# Patient Record
Sex: MALE | Race: WHITE | Employment: OTHER | URBAN - METROPOLITAN AREA
[De-identification: names, ages, dates, MRNs, and addresses within clinical notes are randomized per-mention and may not be internally consistent; named-entity substitution may affect disease eponyms.]

---

## 2017-01-04 ENCOUNTER — TELEPHONE (OUTPATIENT)
Dept: CARDIOLOGY CLINIC | Age: 77
End: 2017-01-04

## 2017-01-30 RX ORDER — AMIODARONE HYDROCHLORIDE 200 MG/1
TABLET ORAL
Qty: 90 TABLET | Refills: 1 | Status: SHIPPED | OUTPATIENT
Start: 2017-01-30 | End: 2017-12-18 | Stop reason: SDUPTHER

## 2017-03-01 ENCOUNTER — HOSPITAL ENCOUNTER (OUTPATIENT)
Dept: OTHER | Age: 77
Discharge: HOME | End: 2017-03-31
Attending: INTERNAL MEDICINE | Admitting: INTERNAL MEDICINE

## 2017-03-25 LAB
INR BLD: 2.4 (ref 0.85–1.15)
PROTHROMBIN TIME: 27.1 SEC (ref 9.6–13)

## 2017-04-06 ENCOUNTER — OFFICE VISIT (OUTPATIENT)
Dept: CARDIOLOGY CLINIC | Age: 77
End: 2017-04-06

## 2017-04-06 VITALS
OXYGEN SATURATION: 96 % | DIASTOLIC BLOOD PRESSURE: 68 MMHG | SYSTOLIC BLOOD PRESSURE: 142 MMHG | WEIGHT: 175 LBS | BODY MASS INDEX: 27.47 KG/M2 | HEART RATE: 66 BPM | HEIGHT: 67 IN

## 2017-04-06 DIAGNOSIS — I48.91 ATRIAL FIBRILLATION, UNSPECIFIED TYPE (HCC): Primary | ICD-10-CM

## 2017-04-06 DIAGNOSIS — I42.9 CARDIOMYOPATHY (HCC): ICD-10-CM

## 2017-04-06 DIAGNOSIS — E11.9 TYPE 2 DIABETES MELLITUS WITHOUT COMPLICATION, WITH LONG-TERM CURRENT USE OF INSULIN (HCC): ICD-10-CM

## 2017-04-06 DIAGNOSIS — Z79.4 TYPE 2 DIABETES MELLITUS WITHOUT COMPLICATION, WITH LONG-TERM CURRENT USE OF INSULIN (HCC): ICD-10-CM

## 2017-04-06 DIAGNOSIS — I42.9 CHF WITH CARDIOMYOPATHY (HCC): ICD-10-CM

## 2017-04-06 DIAGNOSIS — I10 ESSENTIAL HYPERTENSION, BENIGN: ICD-10-CM

## 2017-04-06 DIAGNOSIS — I50.9 CHF WITH CARDIOMYOPATHY (HCC): ICD-10-CM

## 2017-04-06 PROCEDURE — 99214 OFFICE O/P EST MOD 30 MIN: CPT | Performed by: INTERNAL MEDICINE

## 2017-05-31 ENCOUNTER — HOSPITAL ENCOUNTER (OUTPATIENT)
Dept: OTHER | Age: 77
Discharge: HOME | End: 2017-05-31
Attending: INTERNAL MEDICINE | Admitting: INTERNAL MEDICINE

## 2017-06-01 LAB
A/G RATIO: 1 (ref 1.1–2.2)
ALBUMIN SERPL-MCNC: 3.8 G/DL (ref 3.4–5)
ALP BLD-CCNC: 93 U/L (ref 40–129)
ALT SERPL-CCNC: 19 U/L (ref 10–40)
ANION GAP SERPL CALCULATED.3IONS-SCNC: 15 MMOL/L (ref 3–16)
AST SERPL-CCNC: 17 U/L (ref 15–37)
BILIRUB SERPL-MCNC: 0.5 MG/DL (ref 0–1)
BUN BLDV-MCNC: 77 MG/DL (ref 7–20)
CALCIUM SERPL-MCNC: 8.6 MG/DL (ref 8.3–10.6)
CHLORIDE BLD-SCNC: 102 MMOL/L (ref 99–110)
CO2: 24 MMOL/L (ref 21–32)
CREAT SERPL-MCNC: 2.9 MG/DL (ref 0.8–1.3)
ESTIMATED AVERAGE GLUCOSE: 205.9 MG/DL
GFR AFRICAN AMERICAN: 26
GFR NON-AFRICAN AMERICAN: 21
GLOBULIN: 3.7 G/DL
GLUCOSE BLD-MCNC: 198 MG/DL (ref 70–99)
HBA1C MFR BLD: 8.8 %
POTASSIUM SERPL-SCNC: 4.7 MMOL/L (ref 3.5–5.1)
SODIUM BLD-SCNC: 141 MMOL/L (ref 136–145)
TOTAL PROTEIN: 7.5 G/DL (ref 6.4–8.2)

## 2017-06-02 RX ORDER — METOLAZONE 5 MG/1
TABLET ORAL
Qty: 90 TABLET | Refills: 2 | Status: ON HOLD | OUTPATIENT
Start: 2017-06-02 | End: 2018-01-01 | Stop reason: HOSPADM

## 2017-07-06 ENCOUNTER — TELEPHONE (OUTPATIENT)
Dept: CARDIOLOGY CLINIC | Age: 77
End: 2017-07-06

## 2017-07-28 ENCOUNTER — TELEPHONE (OUTPATIENT)
Dept: CARDIOLOGY | Age: 77
End: 2017-07-28

## 2017-07-31 ENCOUNTER — ANTI-COAG VISIT (OUTPATIENT)
Dept: CARDIOLOGY | Age: 77
End: 2017-07-31

## 2017-10-09 ENCOUNTER — OFFICE VISIT (OUTPATIENT)
Dept: CARDIOLOGY CLINIC | Age: 77
End: 2017-10-09

## 2017-10-09 VITALS
HEART RATE: 60 BPM | BODY MASS INDEX: 30.13 KG/M2 | DIASTOLIC BLOOD PRESSURE: 76 MMHG | SYSTOLIC BLOOD PRESSURE: 126 MMHG | OXYGEN SATURATION: 96 % | HEIGHT: 67 IN | WEIGHT: 192 LBS

## 2017-10-09 DIAGNOSIS — Z95.2 S/P AVR (AORTIC VALVE REPLACEMENT): Primary | ICD-10-CM

## 2017-10-09 DIAGNOSIS — N18.30 CKD (CHRONIC KIDNEY DISEASE), STAGE III (HCC): ICD-10-CM

## 2017-10-09 DIAGNOSIS — I48.19 PERSISTENT ATRIAL FIBRILLATION (HCC): ICD-10-CM

## 2017-10-09 DIAGNOSIS — I25.5 ISCHEMIC CARDIOMYOPATHY: ICD-10-CM

## 2017-10-09 DIAGNOSIS — Z95.1 STATUS POST CORONARY ARTERY BYPASS GRAFT: ICD-10-CM

## 2017-10-09 DIAGNOSIS — I50.23 ACUTE ON CHRONIC SYSTOLIC CONGESTIVE HEART FAILURE (HCC): ICD-10-CM

## 2017-10-09 PROCEDURE — 99214 OFFICE O/P EST MOD 30 MIN: CPT | Performed by: INTERNAL MEDICINE

## 2017-10-09 RX ORDER — TORSEMIDE 20 MG/1
20 TABLET ORAL 2 TIMES DAILY
Qty: 180 TABLET | Refills: 3 | Status: ON HOLD | OUTPATIENT
Start: 2017-10-09 | End: 2018-01-01 | Stop reason: HOSPADM

## 2017-10-09 NOTE — COMMUNICATION BODY
[address] Office Note  10/9/2017     Subjective:  Mr Tatiana Torre presents today for cardiology follow up of HTN, aortic valve disease, A Fib, CHF   Today he states he states he feels well overall. He is edematous and complains of shortness of breath. He is using a walker for ambulation due to hip and back issues. He has gained 17 lbs over and above his normal weight. He denies chest pain, dizziness, fatigue or syncope. HPI:  Mr Tatiana Torre is here for cardiology follow up of hypertension aortic valve disease. He has cardiomyopathy and CHF. Review of Systems:  12 point ROS negative in all areas as listed below except as in [place]  Constitutional, EENT, Cardiovascular, pulmonary, GI, , Musculoskeletal, skin, neurological, hematological, endocrine, Psychiatric  Reviewed past medical history, social, and family history.    Does not smoke  Past Medical History:   Diagnosis Date    Acute non Q wave MI (myocardial infarction), initial episode of care ([facility] [place] [address] 04/01/08    Anemia of chronic disease     Blood transfusion     CAD (coronary artery disease)     Carotid occlusion, right     CHF (congestive heart failure) (HCC)     Chronic atrial fibrillation (HCC)     CKD (chronic kidney disease), stage III     Closed fracture of proximal end of right fibula 6/3/2016    [facility]     COPD exacerbation ([facility] [place] [address] 4/21/2013    Diabetes mellitus ([facility] [place] [address]     Glaucoma 2012    Eye surgery this 1 10 2012 and 1 17 2012    Hyperlipidemia     Hypertension     Lung disease     COPD    Mitral regurgitation     mild - moderate    Pneumonia 2009    Pulmonary nodule     Skin cancer     nose     Past Surgical History:   Procedure Laterality Date    AORTIC VALVE REPLACEMENT  4/14/08    bioprosthesis      CATARACT REMOVAL      left    CATARACT REMOVAL Bilateral     COLONOSCOPY  2/19/13    RECTAL, SIGMOID AND TRANSVERSE POLYPS    CORONARY ARTERY BYPASS GRAFT      5 vessel    weeks after starting Demadex  5. Healthy lifestyle education reviewed including nutrition, exercise and activity  6. Follow up with me in 8 weeks  Careful about salt and fluid restriction. QUALITY MEASURES  1. Tobacco Cessation Counseling: NA  2. Retake of BP if >140/90:   Yes  3. Documentation to PCP/referring for new patient:  Sent to PCP at close of office visit  4. CAD patient on anti-platelet: coumadin for  Parox. afib  5. CAD patient on STATIN therapy:  Yes  6.  Patient with CHF and aFib on anticoagulation:  Yes Warfarin       VIOLETTE Morales MD 10/9/2017 1:41 PM

## 2017-10-09 NOTE — PATIENT INSTRUCTIONS
Plan:  1. Start taking 80 mg Lasix twice daily until Demadex arrives  2. Continue taking Metolazone one time weekly  3. Start taking Demadex once it arrives. Stop taking Lasix at that time  4. BMP blood work 2 weeks after starting Demadex  5. Healthy lifestyle education reviewed including nutrition, exercise and activity  6.  Follow up with me in 8 weeks

## 2017-10-09 NOTE — PROGRESS NOTES
HERNIA REPAIR      MOHS SURGERY      SPINE SURGERY         Objective:   /76   Pulse 60   Ht 5' 7\" (1.702 m)   Wt 192 lb (87.1 kg)   SpO2 96%   BMI 30.07 kg/m²     Wt Readings from Last 3 Encounters:   10/09/17 192 lb (87.1 kg)   09/09/17 186 lb (84.4 kg)   04/06/17 175 lb (79.4 kg)       Physical Exam:  General: No Respiratory distress, appears well developed and well nourished. Eyes:  Sclera nonicteric  Nose/Sinuses:  negative findings: nose shows no deformity, asymmetry, or inflammation, nasal mucosa normal, septum midline with no perforation or bleeding  Back:  no pain to palpation  Joint:  no active joint inflammation  Musculoskeletal:  negative  Skin:  Warm and dry lesion on left side of nose that reoccurs bleeds scabs and then is back  Neck:  Positive for JVD and  No Carotid Bruits. Chest: Clear to auscultation, respiration easy  Cardiovascular: 2/6 systolic ejection murmur in aortic area,   RRR, S1S2 normal, no rub or thrill.   Extremities:   BLE edema, +2, No clubbing, cyanosis,  Pulses:  Absent pedal pulses right radial 2+, left radial weak  Neuro: intact    Medications:   Outpatient Encounter Prescriptions as of 10/9/2017   Medication Sig Dispense Refill    torsemide (DEMADEX) 20 MG tablet Take 1 tablet by mouth 2 times daily 180 tablet 3    metolazone (ZAROXOLYN) 5 MG tablet TAKE 1 TABLET DAILY OR AS OTHERWISE DIRECTED 90 tablet 2    amiodarone (CORDARONE) 200 MG tablet TAKE 1 TABLET DAILY (NEED BLOOD WORK AND EKG DONE) 90 tablet 1    KLOR-CON M10 10 MEQ extended release tablet TAKE 1 TABLET DAILY 90 tablet 1    warfarin (COUMADIN) 2 MG tablet Take 1 tablet by mouth daily 135 tablet 3    insulin lispro (HUMALOG) 100 UNIT/ML injection vial Inject into the skin 3 times daily (before meals) Sliding scale      lisinopril (PRINIVIL;ZESTRIL) 5 MG tablet Take 1 tablet by mouth daily (Patient taking differently: Take 5 mg by mouth 2 times daily Once daily) 30 tablet 3    carvedilol (COREG) 6.25 MG tablet TAKE 1 TABLET TWICE A  tablet 1    dorzolamide (TRUSOPT) 2 % ophthalmic solution Place 1 drop into the left eye 2 times daily.  BD INSULIN SYRINGE ULTRAFINE 31G X 5/16\" 0.3 ML MISC USE AS DIRECTED 270 each 3    Misc. Devices (ACAPELLA) MISC by Does not apply route 4 times daily. [employer] 1 each 0    B-D ULTRAFINE III SHORT PEN 31G X 8 MM MISC USE ONE DAILY 100 each 2    latanoprost (XALATAN) 0.005 % ophthalmic solution Place 1 drop into the left eye nightly.  brimonidine (ALPHAGAN P) 0.1 % SOLN 1 drop 2 times daily. Both eyes      Insulin Glargine (LANTUS SC) Inject 25 Units into the skin every evening. 20 to 25 units depending on blood sugar result      [DISCONTINUED] furosemide (LASIX) 40 MG tablet TAKE 1 TABLET TWICE A DAY (Patient taking differently: one tablet twice daily) 180 tablet 3    diclofenac sodium (VOLTAREN) 1 % GEL Apply 4 g topically 4 times daily 5 Tube 3    levalbuterol (XOPENEX HFA) 45 MCG/ACT inhaler Inhale 2 puffs into the lungs 4 times daily 3 Inhaler 3    simvastatin (ZOCOR) 10 MG tablet Take 1 tablet by mouth nightly. 30 tablet 0     No facility-administered encounter medications on file as of 10/9/2017. Lab Data:  CBC:   BMP:   LIVER PROFILE:   LIPID: 12/20/16  Cholesterol, Total 176  Triglycerides 141 HDL 29     Lab Results   Component Value Date    TRIG 141 12/20/2016    TRIG 153 (H) 09/28/2015    TRIG 200 (H) 03/31/2015     Lab Results   Component Value Date    HDL 29 (L) 12/20/2016    HDL 30 (L) 09/28/2015    HDL 33 (L) 03/31/2015     Lab Results   Component Value Date    LDLCALC 119 (H) 12/20/2016    LDLCALC 87 09/28/2015    LDLCALC 88 03/31/2015     Lab Results   Component Value Date    LABVLDL 28 12/20/2016    LABVLDL 31 09/28/2015    LABVLDL 40 03/31/2015     PT/INR:   No results for input(s): PROTIME, INR in the last 72 hours.   A1C:   Lab Results   Component Value Date    LABA1C 8.8 05/31/2017     BNP:      IMAGING:   EKG

## 2017-12-16 ENCOUNTER — HOSPITAL ENCOUNTER (OUTPATIENT)
Dept: OTHER | Age: 77
Discharge: HOME | End: 2017-12-16
Attending: INTERNAL MEDICINE | Admitting: INTERNAL MEDICINE

## 2017-12-16 LAB
ANION GAP SERPL CALCULATED.3IONS-SCNC: 13 MMOL/L (ref 3–16)
BUN BLDV-MCNC: 84 MG/DL (ref 7–20)
CALCIUM SERPL-MCNC: 9.3 MG/DL (ref 8.3–10.6)
CHLORIDE BLD-SCNC: 99 MMOL/L (ref 99–110)
CO2: 23 MMOL/L (ref 21–32)
CREAT SERPL-MCNC: 3.5 MG/DL (ref 0.8–1.3)
GFR AFRICAN AMERICAN: 21
GFR NON-AFRICAN AMERICAN: 17
GLUCOSE BLD-MCNC: 159 MG/DL (ref 70–99)
POTASSIUM SERPL-SCNC: 4.3 MMOL/L (ref 3.5–5.1)
SODIUM BLD-SCNC: 135 MMOL/L (ref 136–145)

## 2017-12-18 ENCOUNTER — OFFICE VISIT (OUTPATIENT)
Dept: CARDIOLOGY CLINIC | Age: 77
End: 2017-12-18

## 2017-12-18 VITALS
DIASTOLIC BLOOD PRESSURE: 64 MMHG | HEIGHT: 67 IN | BODY MASS INDEX: 29.19 KG/M2 | HEART RATE: 63 BPM | WEIGHT: 186 LBS | SYSTOLIC BLOOD PRESSURE: 112 MMHG | OXYGEN SATURATION: 92 %

## 2017-12-18 DIAGNOSIS — I10 ESSENTIAL HYPERTENSION, BENIGN: ICD-10-CM

## 2017-12-18 DIAGNOSIS — Z95.2 S/P AVR (AORTIC VALVE REPLACEMENT): ICD-10-CM

## 2017-12-18 DIAGNOSIS — I25.5 ISCHEMIC CARDIOMYOPATHY: ICD-10-CM

## 2017-12-18 DIAGNOSIS — I48.19 PERSISTENT ATRIAL FIBRILLATION (HCC): Primary | ICD-10-CM

## 2017-12-18 PROCEDURE — 4040F PNEUMOC VAC/ADMIN/RCVD: CPT | Performed by: INTERNAL MEDICINE

## 2017-12-18 PROCEDURE — G8484 FLU IMMUNIZE NO ADMIN: HCPCS | Performed by: INTERNAL MEDICINE

## 2017-12-18 PROCEDURE — G8598 ASA/ANTIPLAT THER USED: HCPCS | Performed by: INTERNAL MEDICINE

## 2017-12-18 PROCEDURE — 1123F ACP DISCUSS/DSCN MKR DOCD: CPT | Performed by: INTERNAL MEDICINE

## 2017-12-18 PROCEDURE — 1036F TOBACCO NON-USER: CPT | Performed by: INTERNAL MEDICINE

## 2017-12-18 PROCEDURE — 99214 OFFICE O/P EST MOD 30 MIN: CPT | Performed by: INTERNAL MEDICINE

## 2017-12-18 PROCEDURE — G8427 DOCREV CUR MEDS BY ELIG CLIN: HCPCS | Performed by: INTERNAL MEDICINE

## 2017-12-18 PROCEDURE — G8417 CALC BMI ABV UP PARAM F/U: HCPCS | Performed by: INTERNAL MEDICINE

## 2017-12-18 RX ORDER — CARVEDILOL 6.25 MG/1
6.25 TABLET ORAL 2 TIMES DAILY WITH MEALS
Qty: 180 TABLET | Refills: 3 | Status: ON HOLD | OUTPATIENT
Start: 2017-12-18 | End: 2018-01-01 | Stop reason: HOSPADM

## 2017-12-18 RX ORDER — WARFARIN SODIUM 2 MG/1
2 TABLET ORAL DAILY
Qty: 135 TABLET | Refills: 3 | Status: SHIPPED | OUTPATIENT
Start: 2017-12-18

## 2017-12-18 RX ORDER — LISINOPRIL 5 MG/1
5 TABLET ORAL DAILY
Qty: 90 TABLET | Refills: 3 | Status: ON HOLD | OUTPATIENT
Start: 2017-12-18 | End: 2018-01-01 | Stop reason: HOSPADM

## 2017-12-18 RX ORDER — POTASSIUM CHLORIDE 750 MG/1
10 TABLET, EXTENDED RELEASE ORAL DAILY
Qty: 90 TABLET | Refills: 3 | Status: ON HOLD | OUTPATIENT
Start: 2017-12-18 | End: 2018-01-01 | Stop reason: HOSPADM

## 2017-12-18 RX ORDER — AMIODARONE HYDROCHLORIDE 200 MG/1
200 TABLET ORAL DAILY
Qty: 90 TABLET | Refills: 3 | Status: ON HOLD | OUTPATIENT
Start: 2017-12-18 | End: 2018-01-01 | Stop reason: HOSPADM

## 2017-12-18 NOTE — PROGRESS NOTES
[address] Office Note  12/18/2017     Subjective:  Mr Bala Morgan presents today for cardiology follow up of HTN, bioprosthesis aortic valve disease, paroxysmal A Fib, systolic CHF   Today he states he states he feels well overall. He remains in wheelchair for exam today. He is using a walker for ambulation at home. His weight is down 12 lbs since last visit. He denies chest pain, dizziness, fatigue or syncope. His wife is present at exam.    HPI:  Mr Bala Morgan has hypertension, AFIB, aortic valve bioprosthesis, cardiomyopathy and systolic CHF. Review of Systems:  12 point ROS negative in all areas as listed below except as in [place]  Constitutional, EENT, Cardiovascular, pulmonary, GI, , Musculoskeletal, skin, neurological, hematological, endocrine, Psychiatric  Reviewed past medical history, social, and family history.    Does not smoke  Past Medical History:   Diagnosis Date    [facility]    Anemia of chronic disease     Blood transfusion     CAD (coronary artery disease)     Carotid occlusion, right     CHF (congestive heart failure) (HCC)     Chronic atrial fibrillation (HCC)     CKD (chronic kidney disease), stage III     Closed fracture of proximal end of right fibula 6/3/2016    [facility]     COPD exacerbation ([facility] [address] 4/21/2013    Diabetes mellitus ([facility] [address]     Glaucoma 2012    Eye surgery this 1 10 2012 and 1 17 2012    Hyperlipidemia     Hypertension     Lung disease     COPD    Mitral regurgitation     mild - moderate    Pneumonia 2009    Pulmonary nodule     Skin cancer     nose     Past Surgical History:   Procedure Laterality Date    AORTIC VALVE REPLACEMENT  4/14/08    bioprosthesis      CATARACT REMOVAL      left    CATARACT REMOVAL Bilateral     COLONOSCOPY  2/19/13    RECTAL, SIGMOID AND TRANSVERSE POLYPS    CORONARY ARTERY BYPASS GRAFT      5 vessel    HERNIA REPAIR      MOHS SURGERY      SPINE SURGERY         Objective:   /64   Pulse 63   Ht 5' 7\" (1.702 m)   Wt 186 lb (84.4 kg)   SpO2 92%   BMI 29.13 kg/m²     Wt Readings from Last 3 Encounters:   12/18/17 186 lb (84.4 kg)   10/09/17 192 lb (87.1 kg)   09/09/17 186 lb (84.4 kg)       Physical Exam:  General: No Respiratory distress, appears well developed and well nourished. Eyes:  Sclera nonicteric  Nose/Sinuses:  negative findings: nose shows no deformity, asymmetry, or inflammation, nasal mucosa normal, septum midline with no perforation or bleeding  Back:  no pain to palpation  Joint:  no active joint inflammation  Musculoskeletal:  negative  Skin:  Warm and dry lesion on left side of nose that reoccurs bleeds scabs and then is back  Neck:  No JVD and  No Carotid Bruits. Chest: Clear to auscultation, respiration easy  Cardiovascular: no murmur in aortic area,   RRR, S1S2 normal, no rub or thrill.   Extremities:   1+ LLE edema, No clubbing, cyanosis,  Pulses:  Absent pedal pulses right radial 2+, left radial weak  Neuro: intact    Medications:   Outpatient Encounter Prescriptions as of 12/18/2017   Medication Sig Dispense Refill    torsemide (DEMADEX) 20 MG tablet Take 1 tablet by mouth 2 times daily 180 tablet 3    metolazone (ZAROXOLYN) 5 MG tablet TAKE 1 TABLET DAILY OR AS OTHERWISE DIRECTED 90 tablet 2    amiodarone (CORDARONE) 200 MG tablet TAKE 1 TABLET DAILY (NEED BLOOD WORK AND EKG DONE) 90 tablet 1    KLOR-CON M10 10 MEQ extended release tablet TAKE 1 TABLET DAILY 90 tablet 1    warfarin (COUMADIN) 2 MG tablet Take 1 tablet by mouth daily 135 tablet 3    insulin lispro (HUMALOG) 100 UNIT/ML injection vial Inject into the skin 3 times daily (before meals) Sliding scale      lisinopril (PRINIVIL;ZESTRIL) 5 MG tablet Take 1 tablet by mouth daily (Patient taking differently: Take 5 mg by mouth 2 times daily Once daily) 30 tablet 3    carvedilol (COREG) 6.25 MG tablet TAKE 1 TABLET TWICE A  tablet 1    dorzolamide (TRUSOPT) 2 % ophthalmic solution Place 1 drop into the left eye 2 times daily.  BD INSULIN SYRINGE ULTRAFINE 31G X 5/16\" 0.3 ML MISC USE AS DIRECTED 270 each 3    Misc. Devices (ACAPELLA) MISC by Does not apply route 4 times daily. [employer] 1 each 0    B-D ULTRAFINE III SHORT PEN 31G X 8 MM MISC USE ONE DAILY 100 each 2    latanoprost (XALATAN) 0.005 % ophthalmic solution Place 1 drop into the left eye nightly.  brimonidine (ALPHAGAN P) 0.1 % SOLN 1 drop 2 times daily. Both eyes      Insulin Glargine (LANTUS SC) Inject 25 Units into the skin every evening. 20 to 25 units depending on blood sugar result      diclofenac sodium (VOLTAREN) 1 % GEL Apply 4 g topically 4 times daily 5 Tube 3    levalbuterol (XOPENEX HFA) 45 MCG/ACT inhaler Inhale 2 puffs into the lungs 4 times daily 3 Inhaler 3    simvastatin (ZOCOR) 10 MG tablet Take 1 tablet by mouth nightly. 30 tablet 0     No facility-administered encounter medications on file as of 12/18/2017. Lab Data:  CBC:   BMP:   LIVER PROFILE:   LIPID: 12/20/16  Cholesterol, Total 176  Triglycerides 141 HDL 29     Lab Results   Component Value Date    TRIG 141 12/20/2016    TRIG 153 (H) 09/28/2015    TRIG 200 (H) 03/31/2015     Lab Results   Component Value Date    HDL 29 (L) 12/20/2016    HDL 30 (L) 09/28/2015    HDL 33 (L) 03/31/2015     Lab Results   Component Value Date    LDLCALC 119 (H) 12/20/2016    LDLCALC 87 09/28/2015    LDLCALC 88 03/31/2015     Lab Results   Component Value Date    LABVLDL 28 12/20/2016    LABVLDL 31 09/28/2015    LABVLDL 40 03/31/2015     PT/INR:   No results for input(s): PROTIME, INR in the last 72 hours. A1C:   Lab Results   Component Value Date    LABA1C 8.8 05/31/2017     BNP:      IMAGING:   EKG 10/10/16   NSR ,IVCD. 1st degree AV block Left BBB no change since 12/16/15    Echocardiogram:  3.7.14  Normal functioning aortic valve.   Study Location: [facility] - Echo Lab  Technical Quality: Poor visualization due to poor acoustical window. Indications: Aortic Valve Replacement and Congestive heart failure. Left ventricle size is borderline  There is mild concentric left ventricular hypertrophy. Ejection fraction is visually estimated to be 20-25 %. Severe Global Hypokinesia  Mitral annular calcification is present. Mild-moderate mitral regurgitation is present. The left atrium is mildly dilated. There is mild tricuspid regurgitation with RVSP estimated at 38 mmHg. EKG  4/20/13  Normal sinus rhythmLeft ventricular hypertrophy with QRS widening and repolarization abnormality    ECHO 12/10/12  1. LV ejection fraction is estimated to be less than 20%. 2. Apical akinesis with severe generalized hypokinesis of remaining  segments. 3. The left ventricle is mildly dilated, mild concentric  hypertrophy, and overall normal global and regional systolic  function. 4. Mild aortic stenosis. Note that AS severity may be  underestimated in setting of LV dysfunction. 5. Mild mitral regurgitation. 6. Mildly elevated pulmonary artery systolic pressure. Assessment:  Florentin Andrade was seen today for 3 month follow-up, congestive heart failure, cardiomyopathy. Diagnoses and associated orders for this visit:    S/P AVR (aortic valve replacement) bioprosthetic valve functioning well    Essential hypertension: B/P 160/80     Status post coronary artery bypass graft    CHF with cardiomyopathy decompensated acute systolic CHF    A-fib    - Resolved on amio and warfarin   - We manage his coumadin therapy    Plan:  1. Cardiac meds reviewed and refilled  2. Continue taking Metolazone one time weekly as well as Demadex 20 mg twice a week  3. Healthy lifestyle education reviewed including nutrition, exercise and activity  4. Follow up with me in 3 months, get Lipids TSH CMP before return  Advised to remain Careful about salt and fluid restriction. QUALITY MEASURES  1.  Tobacco Cessation Counselin. Retake of BP if >140/90:   NA  3. Documentation to PCP/referring for new patient:  Sent to PCP at close of office visit  4. CAD patient on anti-platelet: coumadin for  Parox. afib  5. CAD patient on STATIN therapy:  Yes  6.  Patient with CHF and aFib on anticoagulation:  Yes Warfarin       RAKESH Merlinda Hock, MD 2017 2:51 PM

## 2017-12-18 NOTE — PATIENT INSTRUCTIONS
Plan:  1. Cardiac meds reviewed and refilled  2. Continue taking Metolazone one time weekly as well as Demadex 20 mg twice a week  3. Healthy lifestyle education reviewed including nutrition, exercise and activity  4. Follow up with me in 3 months  Advised to remain Careful about salt and fluid restriction.

## 2017-12-18 NOTE — LETTER
 Diabetes mellitus ([facility] [address]     Glaucoma 2012    Eye surgery this 1 10 2012 and 1 17 2012    Hyperlipidemia     Hypertension     Lung disease     COPD    Mitral regurgitation     mild - moderate    Pneumonia 2009    Pulmonary nodule     Skin cancer     nose     Past Surgical History:   Procedure Laterality Date    AORTIC VALVE REPLACEMENT  4/14/08    bioprosthesis      CATARACT REMOVAL      left    CATARACT REMOVAL Bilateral     COLONOSCOPY  2/19/13    RECTAL, SIGMOID AND TRANSVERSE POLYPS    CORONARY ARTERY BYPASS GRAFT      5 vessel    HERNIA REPAIR      MOHS SURGERY      SPINE SURGERY         Objective:   /64   Pulse 63   Ht 5' 7\" (1.702 m)   Wt 186 lb (84.4 kg)   SpO2 92%   BMI 29.13 kg/m²      Wt Readings from Last 3 Encounters:   12/18/17 186 lb (84.4 kg)   10/09/17 192 lb (87.1 kg)   09/09/17 186 lb (84.4 kg)       Physical Exam:  General: No Respiratory distress, appears well developed and well nourished. Eyes:  Sclera nonicteric  Nose/Sinuses:  negative findings: nose shows no deformity, asymmetry, or inflammation, nasal mucosa normal, septum midline with no perforation or bleeding  Back:  no pain to palpation  Joint:  no active joint inflammation  Musculoskeletal:  negative  Skin:  Warm and dry lesion on left side of nose that reoccurs bleeds scabs and then is back  Neck:  No JVD and  No Carotid Bruits. Chest: Clear to auscultation, respiration easy  Cardiovascular: no murmur in aortic area,   RRR, S1S2 normal, no rub or thrill.   Extremities:   1+ LLE edema, No clubbing, cyanosis,  Pulses:  Absent pedal pulses right radial 2+, left radial weak  Neuro: intact    Medications:   Outpatient Encounter Prescriptions as of 12/18/2017   Medication Sig Dispense Refill    torsemide (DEMADEX) 20 MG tablet Take 1 tablet by mouth 2 times daily 180 tablet 3    metolazone (ZAROXOLYN) 5 MG tablet TAKE 1 TABLET DAILY OR AS OTHERWISE DIRECTED 90 tablet 2  amiodarone (CORDARONE) 200 MG tablet TAKE 1 TABLET DAILY (NEED BLOOD WORK AND EKG DONE) 90 tablet 1    KLOR-CON M10 10 MEQ extended release tablet TAKE 1 TABLET DAILY 90 tablet 1    warfarin (COUMADIN) 2 MG tablet Take 1 tablet by mouth daily 135 tablet 3    insulin lispro (HUMALOG) 100 UNIT/ML injection vial Inject into the skin 3 times daily (before meals) Sliding scale      lisinopril (PRINIVIL;ZESTRIL) 5 MG tablet Take 1 tablet by mouth daily (Patient taking differently: Take 5 mg by mouth 2 times daily Once daily) 30 tablet 3    carvedilol (COREG) 6.25 MG tablet TAKE 1 TABLET TWICE A  tablet 1    dorzolamide (TRUSOPT) 2 % ophthalmic solution Place 1 drop into the left eye 2 times daily.  BD INSULIN SYRINGE ULTRAFINE 31G X 5/16\" 0.3 ML MISC USE AS DIRECTED 270 each 3    Misc. Devices (ACAPELLA) MISC by Does not apply route 4 times daily. [employer] 1 each 0    B-D ULTRAFINE III SHORT PEN 31G X 8 MM MISC USE ONE DAILY 100 each 2    latanoprost (XALATAN) 0.005 % ophthalmic solution Place 1 drop into the left eye nightly.  brimonidine (ALPHAGAN P) 0.1 % SOLN 1 drop 2 times daily. Both eyes      Insulin Glargine (LANTUS SC) Inject 25 Units into the skin every evening. 20 to 25 units depending on blood sugar result      diclofenac sodium (VOLTAREN) 1 % GEL Apply 4 g topically 4 times daily 5 Tube 3    levalbuterol (XOPENEX HFA) 45 MCG/ACT inhaler Inhale 2 puffs into the lungs 4 times daily 3 Inhaler 3    simvastatin (ZOCOR) 10 MG tablet Take 1 tablet by mouth nightly. 30 tablet 0     No facility-administered encounter medications on file as of 12/18/2017.        Lab Data:  CBC:   BMP:   LIVER PROFILE:   LIPID: 12/20/16  Cholesterol, Total 176  Triglycerides 141 HDL 29     Lab Results   Component Value Date    TRIG 141 12/20/2016    TRIG 153 (H) 09/28/2015    TRIG 200 (H) 03/31/2015     Lab Results   Component Value Date    HDL 29 (L) 12/20/2016    HDL 30 (L) 09/28/2015

## 2017-12-18 NOTE — COMMUNICATION BODY
[address] Office Note  12/18/2017     Subjective:  Mr Harrison English presents today for cardiology follow up of HTN, bioprosthesis aortic valve disease, paroxysmal A Fib, systolic CHF   Today he states he states he feels well overall. He remains in wheelchair for exam today. He is using a walker for ambulation at home. His weight is down 12 lbs since last visit. He denies chest pain, dizziness, fatigue or syncope. His wife is present at exam.    HPI:  Mr Harrison English has hypertension, AFIB, aortic valve bioprosthesis, cardiomyopathy and systolic CHF. Review of Systems:  12 point ROS negative in all areas as listed below except as in [place]  Constitutional, EENT, Cardiovascular, pulmonary, GI, , Musculoskeletal, skin, neurological, hematological, endocrine, Psychiatric  Reviewed past medical history, social, and family history.    Does not smoke  Past Medical History:   Diagnosis Date    [facility]    Anemia of chronic disease     Blood transfusion     CAD (coronary artery disease)     Carotid occlusion, right     CHF (congestive heart failure) (HCC)     Chronic atrial fibrillation (HCC)     CKD (chronic kidney disease), stage III     Closed fracture of proximal end of right fibula 6/3/2016    [facility]     [facility] 4/21/2013    Diabetes mellitus ([facility] [facility]     Glaucoma 2012    Eye surgery this 1 10 2012 and 1 17 2012    Hyperlipidemia     Hypertension     Lung disease     COPD    Mitral regurgitation     mild - moderate    Pneumonia 2009    Pulmonary nodule     Skin cancer     nose     Past Surgical History:   Procedure Laterality Date    AORTIC VALVE REPLACEMENT  4/14/08    bioprosthesis      CATARACT REMOVAL      left    CATARACT REMOVAL Bilateral     COLONOSCOPY  2/19/13    RECTAL, SIGMOID AND TRANSVERSE POLYPS    CORONARY ARTERY BYPASS GRAFT      5 vessel    HERNIA REPAIR      MOHS SURGERY      SPINE SURGERY         Objective:   /64   Pulse 63   Ht 5' 7\" (1.702 m)   Wt 186 lb (84.4 kg)   SpO2 92%   BMI 29.13 kg/m²      Wt Readings from Last 3 Encounters:   12/18/17 186 lb (84.4 kg)   10/09/17 192 lb (87.1 kg)   09/09/17 186 lb (84.4 kg)       Physical Exam:  General: No Respiratory distress, appears well developed and well nourished. Eyes:  Sclera nonicteric  Nose/Sinuses:  negative findings: nose shows no deformity, asymmetry, or inflammation, nasal mucosa normal, septum midline with no perforation or bleeding  Back:  no pain to palpation  Joint:  no active joint inflammation  Musculoskeletal:  negative  Skin:  Warm and dry lesion on left side of nose that reoccurs bleeds scabs and then is back  Neck:  No JVD and  No Carotid Bruits. Chest: Clear to auscultation, respiration easy  Cardiovascular: no murmur in aortic area,   RRR, S1S2 normal, no rub or thrill.   Extremities:   1+ LLE edema, No clubbing, cyanosis,  Pulses:  Absent pedal pulses right radial 2+, left radial weak  Neuro: intact    Medications:   Outpatient Encounter Prescriptions as of 12/18/2017   Medication Sig Dispense Refill    torsemide (DEMADEX) 20 MG tablet Take 1 tablet by mouth 2 times daily 180 tablet 3    metolazone (ZAROXOLYN) 5 MG tablet TAKE 1 TABLET DAILY OR AS OTHERWISE DIRECTED 90 tablet 2    amiodarone (CORDARONE) 200 MG tablet TAKE 1 TABLET DAILY (NEED BLOOD WORK AND EKG DONE) 90 tablet 1    KLOR-CON M10 10 MEQ extended release tablet TAKE 1 TABLET DAILY 90 tablet 1    warfarin (COUMADIN) 2 MG tablet Take 1 tablet by mouth daily 135 tablet 3    insulin lispro (HUMALOG) 100 UNIT/ML injection vial Inject into the skin 3 times daily (before meals) Sliding scale      lisinopril (PRINIVIL;ZESTRIL) 5 MG tablet Take 1 tablet by mouth daily (Patient taking differently: Take 5 mg by mouth 2 times daily Once daily) 30 tablet 3    carvedilol (COREG) 6.25 MG tablet TAKE 1 Tobacco Cessation Counselin. Retake of BP if >140/90:   NA  3. Documentation to PCP/referring for new patient:  Sent to PCP at close of office visit  4. CAD patient on anti-platelet: coumadin for  Parox. afib  5. CAD patient on STATIN therapy:  Yes  6.  Patient with CHF and aFib on anticoagulation:  Yes Warfarin       VIOLETTE Merrill MD 2017 2:51 PM

## 2017-12-20 ENCOUNTER — TELEPHONE (OUTPATIENT)
Dept: CARDIOLOGY CLINIC | Age: 77
End: 2017-12-20

## 2017-12-20 NOTE — TELEPHONE ENCOUNTER
----- Message from Stefania Bryant MD sent at 12/20/2017  7:27 AM EST -----  Kidney function is getting worse, not sure if he is seeing any nephrologist, if not I want some one from our office to make a referral to nephrology Dr Ambreen Nunez at [facility].

## 2018-01-01 ENCOUNTER — TELEPHONE (OUTPATIENT)
Dept: TELEMETRY | Age: 78
End: 2018-01-01

## 2018-01-01 ENCOUNTER — APPOINTMENT (OUTPATIENT)
Dept: CT IMAGING | Age: 78
End: 2018-01-01
Payer: MEDICARE

## 2018-01-01 ENCOUNTER — TELEPHONE (OUTPATIENT)
Dept: CARDIOLOGY CLINIC | Age: 78
End: 2018-01-01

## 2018-01-01 ENCOUNTER — ANTI-COAG VISIT (OUTPATIENT)
Dept: CARDIOLOGY CLINIC | Age: 78
End: 2018-01-01

## 2018-01-01 ENCOUNTER — APPOINTMENT (OUTPATIENT)
Dept: GENERAL RADIOLOGY | Age: 78
DRG: 177 | End: 2018-01-01
Payer: MEDICARE

## 2018-01-01 ENCOUNTER — TELEPHONE (OUTPATIENT)
Dept: SURGERY | Age: 78
End: 2018-01-01

## 2018-01-01 ENCOUNTER — TELEPHONE (OUTPATIENT)
Dept: INPATIENT UNIT | Age: 78
End: 2018-01-01

## 2018-01-01 ENCOUNTER — ANESTHESIA EVENT (OUTPATIENT)
Dept: OPERATING ROOM | Age: 78
End: 2018-01-01
Payer: MEDICARE

## 2018-01-01 ENCOUNTER — TELEPHONE (OUTPATIENT)
Dept: VASCULAR SURGERY | Age: 78
End: 2018-01-01

## 2018-01-01 ENCOUNTER — HOSPITAL ENCOUNTER (OUTPATIENT)
Age: 78
Setting detail: OUTPATIENT SURGERY
Discharge: HOME | End: 2018-08-20
Attending: SURGERY | Admitting: SURGERY
Payer: MEDICARE

## 2018-01-01 ENCOUNTER — ANESTHESIA (OUTPATIENT)
Dept: OPERATING ROOM | Age: 78
End: 2018-01-01
Payer: MEDICARE

## 2018-01-01 ENCOUNTER — OFFICE VISIT (OUTPATIENT)
Dept: VASCULAR SURGERY | Age: 78
End: 2018-01-01

## 2018-01-01 ENCOUNTER — TELEPHONE (OUTPATIENT)
Dept: OTHER | Facility: CLINIC | Age: 78
End: 2018-01-01

## 2018-01-01 ENCOUNTER — HOSPITAL ENCOUNTER (EMERGENCY)
Age: 78
Discharge: HOME | End: 2018-07-31
Attending: EMERGENCY MEDICINE
Payer: MEDICARE

## 2018-01-01 ENCOUNTER — APPOINTMENT (OUTPATIENT)
Dept: CT IMAGING | Age: 78
DRG: 177 | End: 2018-01-01
Payer: MEDICARE

## 2018-01-01 ENCOUNTER — HOSPITAL ENCOUNTER (INPATIENT)
Age: 78
LOS: 12 days | DRG: 177 | End: 2018-12-31
Attending: EMERGENCY MEDICINE | Admitting: INTERNAL MEDICINE
Payer: MEDICARE

## 2018-01-01 ENCOUNTER — OFFICE VISIT (OUTPATIENT)
Dept: PULMONOLOGY | Age: 78
End: 2018-01-01

## 2018-01-01 ENCOUNTER — APPOINTMENT (OUTPATIENT)
Dept: GENERAL RADIOLOGY | Age: 78
DRG: 070 | End: 2018-01-01
Payer: MEDICARE

## 2018-01-01 ENCOUNTER — HOSPITAL ENCOUNTER (INPATIENT)
Age: 78
LOS: 13 days | Discharge: HOME HEALTH | DRG: 177 | End: 2018-11-06
Attending: EMERGENCY MEDICINE
Payer: MEDICARE

## 2018-01-01 ENCOUNTER — HOSPITAL ENCOUNTER (OUTPATIENT)
Dept: OTHER | Age: 78
Discharge: HOME | End: 2018-03-12
Attending: INTERNAL MEDICINE | Admitting: INTERNAL MEDICINE

## 2018-01-01 ENCOUNTER — HOSPITAL ENCOUNTER (OUTPATIENT)
Dept: OTHER | Age: 78
Discharge: HOME | End: 2018-04-19
Attending: INTERNAL MEDICINE | Admitting: INTERNAL MEDICINE

## 2018-01-01 ENCOUNTER — SURG/PROC ORDERS (OUTPATIENT)
Dept: CARDIOTHORACIC SURGERY | Age: 78
End: 2018-01-01

## 2018-01-01 ENCOUNTER — HOSPITAL ENCOUNTER (OUTPATIENT)
Dept: OTHER | Age: 78
Discharge: HOME | End: 2018-05-03
Attending: INTERNAL MEDICINE | Admitting: INTERNAL MEDICINE

## 2018-01-01 ENCOUNTER — SURG/PROC ORDERS (OUTPATIENT)
Dept: VASCULAR SURGERY | Age: 78
End: 2018-01-01

## 2018-01-01 ENCOUNTER — HOSPITAL ENCOUNTER (INPATIENT)
Age: 78
LOS: 2 days | Discharge: HOME HEALTH | DRG: 689 | End: 2018-08-11
Attending: EMERGENCY MEDICINE | Admitting: INTERNAL MEDICINE
Payer: MEDICARE

## 2018-01-01 ENCOUNTER — HOSPITAL ENCOUNTER (OUTPATIENT)
Dept: CT IMAGING | Age: 78
Discharge: HOME | End: 2018-10-15
Payer: MEDICARE

## 2018-01-01 ENCOUNTER — HOSPITAL ENCOUNTER (OUTPATIENT)
Dept: SURGERY | Age: 78
Discharge: HOME | End: 2018-04-27
Attending: SURGERY | Admitting: SURGERY

## 2018-01-01 ENCOUNTER — TELEPHONE (OUTPATIENT)
Dept: PULMONOLOGY | Age: 78
End: 2018-01-01

## 2018-01-01 ENCOUNTER — APPOINTMENT (OUTPATIENT)
Dept: GENERAL RADIOLOGY | Age: 78
End: 2018-01-01
Payer: MEDICARE

## 2018-01-01 ENCOUNTER — HOSPITAL ENCOUNTER (OUTPATIENT)
Dept: OTHER | Age: 78
Discharge: HOME | End: 2018-07-03
Attending: INTERNAL MEDICINE | Admitting: INTERNAL MEDICINE

## 2018-01-01 ENCOUNTER — HOSPITAL ENCOUNTER (OUTPATIENT)
Dept: OTHER | Age: 78
Discharge: HOME | End: 2018-01-29
Attending: INTERNAL MEDICINE | Admitting: INTERNAL MEDICINE

## 2018-01-01 ENCOUNTER — HOSPITAL ENCOUNTER (OUTPATIENT)
Dept: OTHER | Age: 78
Discharge: HOME | End: 2018-04-13
Attending: INTERNAL MEDICINE | Admitting: INTERNAL MEDICINE

## 2018-01-01 ENCOUNTER — HOSPITAL ENCOUNTER (INPATIENT)
Age: 78
LOS: 3 days | Discharge: HOME | DRG: 070 | End: 2018-11-26
Attending: EMERGENCY MEDICINE | Admitting: INTERNAL MEDICINE
Payer: MEDICARE

## 2018-01-01 ENCOUNTER — APPOINTMENT (OUTPATIENT)
Dept: GENERAL RADIOLOGY | Age: 78
DRG: 689 | End: 2018-01-01
Payer: MEDICARE

## 2018-01-01 ENCOUNTER — HOSPITAL ENCOUNTER (OUTPATIENT)
Age: 78
Setting detail: OUTPATIENT SURGERY
Discharge: HOME | End: 2018-09-27
Attending: SURGERY | Admitting: SURGERY
Payer: MEDICARE

## 2018-01-01 ENCOUNTER — HOSPITAL ENCOUNTER (OUTPATIENT)
Dept: OTHER | Age: 78
Discharge: HOME | End: 2018-03-31
Attending: INTERNAL MEDICINE | Admitting: INTERNAL MEDICINE

## 2018-01-01 VITALS
RESPIRATION RATE: 5 BRPM | OXYGEN SATURATION: 99 % | DIASTOLIC BLOOD PRESSURE: 61 MMHG | SYSTOLIC BLOOD PRESSURE: 98 MMHG

## 2018-01-01 VITALS
TEMPERATURE: 97.8 F | HEART RATE: 79 BPM | RESPIRATION RATE: 18 BRPM | WEIGHT: 177 LBS | SYSTOLIC BLOOD PRESSURE: 103 MMHG | OXYGEN SATURATION: 97 % | HEIGHT: 67 IN | DIASTOLIC BLOOD PRESSURE: 66 MMHG | BODY MASS INDEX: 27.78 KG/M2

## 2018-01-01 VITALS
OXYGEN SATURATION: 100 % | RESPIRATION RATE: 7 BRPM | DIASTOLIC BLOOD PRESSURE: 47 MMHG | SYSTOLIC BLOOD PRESSURE: 90 MMHG | TEMPERATURE: 96.8 F

## 2018-01-01 VITALS
BODY MASS INDEX: 25.61 KG/M2 | OXYGEN SATURATION: 96 % | HEART RATE: 72 BPM | SYSTOLIC BLOOD PRESSURE: 119 MMHG | HEIGHT: 67 IN | TEMPERATURE: 97.3 F | WEIGHT: 163.14 LBS | DIASTOLIC BLOOD PRESSURE: 66 MMHG | RESPIRATION RATE: 18 BRPM

## 2018-01-01 VITALS
TEMPERATURE: 96.3 F | WEIGHT: 170 LBS | OXYGEN SATURATION: 97 % | DIASTOLIC BLOOD PRESSURE: 75 MMHG | RESPIRATION RATE: 16 BRPM | BODY MASS INDEX: 26.68 KG/M2 | HEIGHT: 67 IN | HEART RATE: 73 BPM | SYSTOLIC BLOOD PRESSURE: 132 MMHG

## 2018-01-01 VITALS
TEMPERATURE: 97 F | OXYGEN SATURATION: 100 % | SYSTOLIC BLOOD PRESSURE: 116 MMHG | DIASTOLIC BLOOD PRESSURE: 66 MMHG | HEART RATE: 74 BPM | WEIGHT: 175 LBS | RESPIRATION RATE: 22 BRPM | HEIGHT: 67 IN | BODY MASS INDEX: 27.47 KG/M2

## 2018-01-01 VITALS
HEIGHT: 67 IN | SYSTOLIC BLOOD PRESSURE: 123 MMHG | TEMPERATURE: 98 F | OXYGEN SATURATION: 92 % | DIASTOLIC BLOOD PRESSURE: 69 MMHG | WEIGHT: 177 LBS | RESPIRATION RATE: 14 BRPM | BODY MASS INDEX: 27.78 KG/M2 | HEART RATE: 83 BPM

## 2018-01-01 VITALS — WEIGHT: 156 LBS | BODY MASS INDEX: 24.48 KG/M2 | HEIGHT: 67 IN

## 2018-01-01 VITALS
WEIGHT: 156.75 LBS | HEART RATE: 53 BPM | BODY MASS INDEX: 24.6 KG/M2 | RESPIRATION RATE: 20 BRPM | OXYGEN SATURATION: 87 % | SYSTOLIC BLOOD PRESSURE: 98 MMHG | TEMPERATURE: 97.3 F | HEIGHT: 67 IN | DIASTOLIC BLOOD PRESSURE: 53 MMHG

## 2018-01-01 VITALS
DIASTOLIC BLOOD PRESSURE: 65 MMHG | SYSTOLIC BLOOD PRESSURE: 135 MMHG | BODY MASS INDEX: 27.47 KG/M2 | HEART RATE: 73 BPM | TEMPERATURE: 97.7 F | RESPIRATION RATE: 14 BRPM | OXYGEN SATURATION: 94 % | WEIGHT: 175 LBS | HEIGHT: 67 IN

## 2018-01-01 VITALS
WEIGHT: 175 LBS | HEIGHT: 67 IN | HEIGHT: 67 IN | BODY MASS INDEX: 27.47 KG/M2 | SYSTOLIC BLOOD PRESSURE: 120 MMHG | DIASTOLIC BLOOD PRESSURE: 80 MMHG | SYSTOLIC BLOOD PRESSURE: 120 MMHG | DIASTOLIC BLOOD PRESSURE: 70 MMHG | BODY MASS INDEX: 27.47 KG/M2 | WEIGHT: 175 LBS

## 2018-01-01 VITALS
OXYGEN SATURATION: 97 % | TEMPERATURE: 97 F | HEIGHT: 67 IN | RESPIRATION RATE: 18 BRPM | HEART RATE: 56 BPM | SYSTOLIC BLOOD PRESSURE: 122 MMHG | DIASTOLIC BLOOD PRESSURE: 82 MMHG

## 2018-01-01 VITALS
SYSTOLIC BLOOD PRESSURE: 146 MMHG | TEMPERATURE: 97.3 F | HEIGHT: 65 IN | BODY MASS INDEX: 30.12 KG/M2 | OXYGEN SATURATION: 95 % | DIASTOLIC BLOOD PRESSURE: 68 MMHG | HEART RATE: 79 BPM | RESPIRATION RATE: 17 BRPM | WEIGHT: 180.78 LBS

## 2018-01-01 VITALS
DIASTOLIC BLOOD PRESSURE: 60 MMHG | SYSTOLIC BLOOD PRESSURE: 120 MMHG | BODY MASS INDEX: 27.47 KG/M2 | WEIGHT: 175 LBS | HEIGHT: 67 IN

## 2018-01-01 DIAGNOSIS — R31.9 URINARY TRACT INFECTION WITH HEMATURIA, SITE UNSPECIFIED: Primary | ICD-10-CM

## 2018-01-01 DIAGNOSIS — C34.11 CANCER OF BRONCHUS OF RIGHT UPPER LOBE (HCC): ICD-10-CM

## 2018-01-01 DIAGNOSIS — R77.8 ELEVATED TROPONIN: ICD-10-CM

## 2018-01-01 DIAGNOSIS — Z99.2 ENCOUNTER REGARDING VASCULAR ACCESS FOR DIALYSIS FOR ESRD (HCC): Primary | ICD-10-CM

## 2018-01-01 DIAGNOSIS — N18.6 END STAGE RENAL DISEASE (HCC): Primary | ICD-10-CM

## 2018-01-01 DIAGNOSIS — N39.0 URINARY TRACT INFECTION WITH HEMATURIA, SITE UNSPECIFIED: Primary | ICD-10-CM

## 2018-01-01 DIAGNOSIS — J96.21 ACUTE ON CHRONIC RESPIRATORY FAILURE WITH HYPOXIA (HCC): ICD-10-CM

## 2018-01-01 DIAGNOSIS — L89.610 DECUBITUS ULCER OF RIGHT HEEL, UNSTAGEABLE (HCC): ICD-10-CM

## 2018-01-01 DIAGNOSIS — N18.6 ENCOUNTER REGARDING VASCULAR ACCESS FOR DIALYSIS FOR ESRD (HCC): Primary | ICD-10-CM

## 2018-01-01 DIAGNOSIS — J44.9 CHRONIC OBSTRUCTIVE PULMONARY DISEASE, UNSPECIFIED COPD TYPE (HCC): ICD-10-CM

## 2018-01-01 DIAGNOSIS — J44.9 CHRONIC OBSTRUCTIVE PULMONARY DISEASE, UNSPECIFIED COPD TYPE (HCC): Primary | ICD-10-CM

## 2018-01-01 DIAGNOSIS — C34.91 NON-SMALL CELL CANCER OF RIGHT LUNG (HCC): ICD-10-CM

## 2018-01-01 DIAGNOSIS — J43.8 OTHER EMPHYSEMA (HCC): ICD-10-CM

## 2018-01-01 DIAGNOSIS — J90 PLEURAL EFFUSION: ICD-10-CM

## 2018-01-01 DIAGNOSIS — R41.0 CONFUSION: Primary | ICD-10-CM

## 2018-01-01 DIAGNOSIS — N18.9 CHRONIC KIDNEY DISEASE, UNSPECIFIED CKD STAGE: ICD-10-CM

## 2018-01-01 DIAGNOSIS — N18.6 END STAGE RENAL DISEASE (HCC): ICD-10-CM

## 2018-01-01 DIAGNOSIS — M70.62 TROCHANTERIC BURSITIS OF LEFT HIP: ICD-10-CM

## 2018-01-01 DIAGNOSIS — L97.408: ICD-10-CM

## 2018-01-01 DIAGNOSIS — J18.9 HCAP (HEALTHCARE-ASSOCIATED PNEUMONIA): Primary | ICD-10-CM

## 2018-01-01 DIAGNOSIS — J18.9 PNEUMONIA DUE TO ORGANISM: ICD-10-CM

## 2018-01-01 DIAGNOSIS — R52 PAIN: Primary | ICD-10-CM

## 2018-01-01 DIAGNOSIS — K56.41 FECAL IMPACTION IN RECTUM (HCC): ICD-10-CM

## 2018-01-01 DIAGNOSIS — Z79.01 ANTICOAGULATED: ICD-10-CM

## 2018-01-01 DIAGNOSIS — Z09 POSTOP CHECK: Primary | ICD-10-CM

## 2018-01-01 DIAGNOSIS — J96.00 ACUTE RESPIRATORY FAILURE, UNSPECIFIED WHETHER WITH HYPOXIA OR HYPERCAPNIA (HCC): ICD-10-CM

## 2018-01-01 DIAGNOSIS — N30.00 ACUTE CYSTITIS WITHOUT HEMATURIA: Primary | ICD-10-CM

## 2018-01-01 DIAGNOSIS — Z99.2 PERITONEAL DIALYSIS CATHETER IN PLACE (HCC): ICD-10-CM

## 2018-01-01 DIAGNOSIS — I50.9 ACUTE ON CHRONIC CONGESTIVE HEART FAILURE, UNSPECIFIED HEART FAILURE TYPE (HCC): ICD-10-CM

## 2018-01-01 DIAGNOSIS — R41.82 ALTERED MENTAL STATUS, UNSPECIFIED ALTERED MENTAL STATUS TYPE: ICD-10-CM

## 2018-01-01 DIAGNOSIS — E08.621: ICD-10-CM

## 2018-01-01 DIAGNOSIS — C34.11 MALIGNANT NEOPLASM OF UPPER LOBE OF RIGHT LUNG (HCC): Primary | ICD-10-CM

## 2018-01-01 DIAGNOSIS — J90 PLEURAL EFFUSION: Primary | ICD-10-CM

## 2018-01-01 DIAGNOSIS — C34.11 MALIGNANT NEOPLASM OF UPPER LOBE OF RIGHT LUNG (HCC): ICD-10-CM

## 2018-01-01 LAB
A/G RATIO: 0.6 (ref 1.1–2.2)
A/G RATIO: 0.8 (ref 1.1–2.2)
A/G RATIO: 0.8 (ref 1.1–2.2)
A/G RATIO: 0.9 (ref 1.1–2.2)
A/G RATIO: 1 (ref 1.1–2.2)
A/G RATIO: 1.1 (ref 1.1–2.2)
ALBUMIN SERPL-MCNC: 2.4 G/DL (ref 3.4–5)
ALBUMIN SERPL-MCNC: 2.5 G/DL (ref 3.4–5)
ALBUMIN SERPL-MCNC: 2.7 G/DL (ref 3.4–5)
ALBUMIN SERPL-MCNC: 2.7 G/DL (ref 3.4–5)
ALBUMIN SERPL-MCNC: 2.8 G/DL (ref 3.4–5)
ALBUMIN SERPL-MCNC: 2.9 G/DL (ref 3.4–5)
ALBUMIN SERPL-MCNC: 3 G/DL (ref 3.4–5)
ALBUMIN SERPL-MCNC: 3.2 G/DL (ref 3.4–5)
ALBUMIN SERPL-MCNC: 3.4 G/DL (ref 3.4–5)
ALBUMIN SERPL-MCNC: 3.9 G/DL (ref 3.4–5)
ALBUMIN SERPL-MCNC: 4.2 G/DL (ref 3.4–5)
ALP BLD-CCNC: 120 U/L (ref 40–129)
ALP BLD-CCNC: 135 U/L (ref 40–129)
ALP BLD-CCNC: 169 U/L (ref 40–129)
ALP BLD-CCNC: 189 U/L (ref 40–129)
ALP BLD-CCNC: 208 U/L (ref 40–129)
ALP BLD-CCNC: 95 U/L (ref 40–129)
ALT SERPL-CCNC: 13 U/L (ref 10–40)
ALT SERPL-CCNC: 16 U/L (ref 10–40)
ALT SERPL-CCNC: 20 U/L (ref 10–40)
ALT SERPL-CCNC: 20 U/L (ref 10–40)
ALT SERPL-CCNC: 25 U/L (ref 10–40)
ALT SERPL-CCNC: 86 U/L (ref 10–40)
AMORPHOUS: ABNORMAL /HPF
AMORPHOUS: ABNORMAL /HPF
ANION GAP SERPL CALCULATED.3IONS-SCNC: 10 MMOL/L (ref 3–16)
ANION GAP SERPL CALCULATED.3IONS-SCNC: 11 MMOL/L (ref 3–16)
ANION GAP SERPL CALCULATED.3IONS-SCNC: 12 MMOL/L (ref 3–16)
ANION GAP SERPL CALCULATED.3IONS-SCNC: 13 MMOL/L (ref 3–16)
ANION GAP SERPL CALCULATED.3IONS-SCNC: 14 MMOL/L (ref 3–16)
ANION GAP SERPL CALCULATED.3IONS-SCNC: 15 MMOL/L (ref 3–16)
ANION GAP SERPL CALCULATED.3IONS-SCNC: 16 MMOL/L (ref 3–16)
ANION GAP SERPL CALCULATED.3IONS-SCNC: 16 MMOL/L (ref 3–16)
ANION GAP SERPL CALCULATED.3IONS-SCNC: 18 MMOL/L (ref 3–16)
ANION GAP SERPL CALCULATED.3IONS-SCNC: 18 MMOL/L (ref 3–16)
ANION GAP SERPL CALCULATED.3IONS-SCNC: 8 MMOL/L (ref 3–16)
ANION GAP SERPL CALCULATED.3IONS-SCNC: 9 MMOL/L (ref 3–16)
APTT: 37.3 SEC (ref 24.1–34.9)
APTT: 40.4 SEC (ref 26–36)
APTT: 46 SEC (ref 26–36)
APTT: 65.1 SEC (ref 26–36)
APTT: 68.8 SEC (ref 26–36)
APTT: 69.5 SEC (ref 26–36)
APTT: 74.9 SEC (ref 26–36)
APTT: 76.6 SEC (ref 26–36)
APTT: 93.8 SEC (ref 26–36)
AST SERPL-CCNC: 15 U/L (ref 15–37)
AST SERPL-CCNC: 15 U/L (ref 15–37)
AST SERPL-CCNC: 19 U/L (ref 15–37)
AST SERPL-CCNC: 20 U/L (ref 15–37)
AST SERPL-CCNC: 29 U/L (ref 15–37)
AST SERPL-CCNC: 55 U/L (ref 15–37)
BACTERIA: ABNORMAL /HPF
BASE EXCESS ARTERIAL: -7.8 MMOL/L (ref -3–3)
BASE EXCESS ARTERIAL: -8 (ref -3–3)
BASE EXCESS VENOUS: -0.5 MMOL/L (ref -3–3)
BASOPHILS ABSOLUTE: 0 K/UL (ref 0–0.2)
BASOPHILS ABSOLUTE: 0.1 K/UL (ref 0–0.2)
BASOPHILS RELATIVE PERCENT: 0.3 %
BASOPHILS RELATIVE PERCENT: 0.4 %
BASOPHILS RELATIVE PERCENT: 0.4 %
BASOPHILS RELATIVE PERCENT: 0.5 %
BASOPHILS RELATIVE PERCENT: 0.5 %
BASOPHILS RELATIVE PERCENT: 0.6 %
BASOPHILS RELATIVE PERCENT: 0.6 %
BASOPHILS RELATIVE PERCENT: 0.7 %
BASOPHILS RELATIVE PERCENT: 0.7 %
BASOPHILS RELATIVE PERCENT: 0.9 %
BASOPHILS RELATIVE PERCENT: 0.9 %
BASOPHILS RELATIVE PERCENT: 1 %
BILIRUB SERPL-MCNC: 0.7 MG/DL (ref 0–1)
BILIRUB SERPL-MCNC: 0.8 MG/DL (ref 0–1)
BILIRUB SERPL-MCNC: 0.9 MG/DL (ref 0–1)
BILIRUB SERPL-MCNC: 1.3 MG/DL (ref 0–1)
BILIRUB SERPL-MCNC: 1.6 MG/DL (ref 0–1)
BILIRUB SERPL-MCNC: 1.6 MG/DL (ref 0–1)
BILIRUBIN URINE: ABNORMAL
BILIRUBIN URINE: ABNORMAL
BILIRUBIN URINE: NEGATIVE
BLOOD CULTURE, ROUTINE: ABNORMAL
BLOOD CULTURE, ROUTINE: NORMAL
BLOOD, URINE: ABNORMAL
BUN BLDV-MCNC: 11 MG/DL (ref 7–20)
BUN BLDV-MCNC: 12 MG/DL (ref 7–20)
BUN BLDV-MCNC: 13 MG/DL (ref 7–20)
BUN BLDV-MCNC: 18 MG/DL (ref 7–20)
BUN BLDV-MCNC: 19 MG/DL (ref 7–20)
BUN BLDV-MCNC: 19 MG/DL (ref 7–20)
BUN BLDV-MCNC: 21 MG/DL (ref 7–20)
BUN BLDV-MCNC: 21 MG/DL (ref 7–20)
BUN BLDV-MCNC: 22 MG/DL (ref 7–20)
BUN BLDV-MCNC: 23 MG/DL (ref 7–20)
BUN BLDV-MCNC: 23 MG/DL (ref 7–20)
BUN BLDV-MCNC: 24 MG/DL (ref 7–20)
BUN BLDV-MCNC: 24 MG/DL (ref 7–20)
BUN BLDV-MCNC: 26 MG/DL (ref 7–20)
BUN BLDV-MCNC: 27 MG/DL (ref 7–20)
BUN BLDV-MCNC: 29 MG/DL (ref 7–20)
BUN BLDV-MCNC: 30 MG/DL (ref 7–20)
BUN BLDV-MCNC: 31 MG/DL (ref 7–20)
BUN BLDV-MCNC: 31 MG/DL (ref 7–20)
BUN BLDV-MCNC: 32 MG/DL (ref 7–20)
BUN BLDV-MCNC: 39 MG/DL (ref 7–20)
BUN BLDV-MCNC: 40 MG/DL (ref 7–20)
BUN BLDV-MCNC: 41 MG/DL (ref 7–20)
BUN BLDV-MCNC: 53 MG/DL (ref 7–20)
BUN BLDV-MCNC: 59 MG/DL (ref 7–20)
BUN BLDV-MCNC: 74 MG/DL (ref 7–20)
BUN BLDV-MCNC: 74 MG/DL (ref 7–20)
CALCIUM SERPL-MCNC: 10 MG/DL (ref 8.3–10.6)
CALCIUM SERPL-MCNC: 10.2 MG/DL (ref 8.3–10.6)
CALCIUM SERPL-MCNC: 10.3 MG/DL (ref 8.3–10.6)
CALCIUM SERPL-MCNC: 10.6 MG/DL (ref 8.3–10.6)
CALCIUM SERPL-MCNC: 10.7 MG/DL (ref 8.3–10.6)
CALCIUM SERPL-MCNC: 10.9 MG/DL (ref 8.3–10.6)
CALCIUM SERPL-MCNC: 11.2 MG/DL (ref 8.3–10.6)
CALCIUM SERPL-MCNC: 8.5 MG/DL (ref 8.3–10.6)
CALCIUM SERPL-MCNC: 8.6 MG/DL (ref 8.3–10.6)
CALCIUM SERPL-MCNC: 8.6 MG/DL (ref 8.3–10.6)
CALCIUM SERPL-MCNC: 8.9 MG/DL (ref 8.3–10.6)
CALCIUM SERPL-MCNC: 8.9 MG/DL (ref 8.3–10.6)
CALCIUM SERPL-MCNC: 9.1 MG/DL (ref 8.3–10.6)
CALCIUM SERPL-MCNC: 9.2 MG/DL (ref 8.3–10.6)
CALCIUM SERPL-MCNC: 9.3 MG/DL (ref 8.3–10.6)
CALCIUM SERPL-MCNC: 9.3 MG/DL (ref 8.3–10.6)
CALCIUM SERPL-MCNC: 9.4 MG/DL (ref 8.3–10.6)
CALCIUM SERPL-MCNC: 9.5 MG/DL (ref 8.3–10.6)
CALCIUM SERPL-MCNC: 9.5 MG/DL (ref 8.3–10.6)
CALCIUM SERPL-MCNC: 9.7 MG/DL (ref 8.3–10.6)
CALCIUM SERPL-MCNC: 9.7 MG/DL (ref 8.3–10.6)
CALCIUM SERPL-MCNC: 9.8 MG/DL (ref 8.3–10.6)
CALCIUM SERPL-MCNC: 9.9 MG/DL (ref 8.3–10.6)
CARBOXYHEMOGLOBIN ARTERIAL: 1.4 % (ref 0–1.5)
CARBOXYHEMOGLOBIN: 2.9 % (ref 0–1.5)
CASTS: ABNORMAL /LPF
CHLORIDE BLD-SCNC: 100 MMOL/L (ref 99–110)
CHLORIDE BLD-SCNC: 101 MMOL/L (ref 99–110)
CHLORIDE BLD-SCNC: 102 MMOL/L (ref 99–110)
CHLORIDE BLD-SCNC: 103 MMOL/L (ref 99–110)
CHLORIDE BLD-SCNC: 103 MMOL/L (ref 99–110)
CHLORIDE BLD-SCNC: 88 MMOL/L (ref 99–110)
CHLORIDE BLD-SCNC: 93 MMOL/L (ref 99–110)
CHLORIDE BLD-SCNC: 94 MMOL/L (ref 99–110)
CHLORIDE BLD-SCNC: 94 MMOL/L (ref 99–110)
CHLORIDE BLD-SCNC: 95 MMOL/L (ref 99–110)
CHLORIDE BLD-SCNC: 96 MMOL/L (ref 99–110)
CHLORIDE BLD-SCNC: 97 MMOL/L (ref 99–110)
CHLORIDE BLD-SCNC: 98 MMOL/L (ref 99–110)
CHLORIDE BLD-SCNC: 99 MMOL/L (ref 99–110)
CHOLESTEROL, FASTING: 162 MG/DL (ref 0–199)
CLARITY: ABNORMAL
CLARITY: CLEAR
CO2: 17 MMOL/L (ref 21–32)
CO2: 18 MMOL/L (ref 21–32)
CO2: 19 MMOL/L (ref 21–32)
CO2: 21 MMOL/L (ref 21–32)
CO2: 22 MMOL/L (ref 21–32)
CO2: 23 MMOL/L (ref 21–32)
CO2: 24 MMOL/L (ref 21–32)
CO2: 25 MMOL/L (ref 21–32)
CO2: 26 MMOL/L (ref 21–32)
CO2: 27 MMOL/L (ref 21–32)
CO2: 28 MMOL/L (ref 21–32)
COLOR: ABNORMAL
COLOR: ABNORMAL
COLOR: YELLOW
COMMENT UA: ABNORMAL
CREAT SERPL-MCNC: 1.8 MG/DL (ref 0.8–1.3)
CREAT SERPL-MCNC: 1.9 MG/DL (ref 0.8–1.3)
CREAT SERPL-MCNC: 2 MG/DL (ref 0.8–1.3)
CREAT SERPL-MCNC: 2.1 MG/DL (ref 0.8–1.3)
CREAT SERPL-MCNC: 2.2 MG/DL (ref 0.8–1.3)
CREAT SERPL-MCNC: 2.3 MG/DL (ref 0.8–1.3)
CREAT SERPL-MCNC: 2.4 MG/DL (ref 0.8–1.3)
CREAT SERPL-MCNC: 2.4 MG/DL (ref 0.8–1.3)
CREAT SERPL-MCNC: 2.6 MG/DL (ref 0.8–1.3)
CREAT SERPL-MCNC: 2.6 MG/DL (ref 0.8–1.3)
CREAT SERPL-MCNC: 2.7 MG/DL (ref 0.8–1.3)
CREAT SERPL-MCNC: 2.7 MG/DL (ref 0.8–1.3)
CREAT SERPL-MCNC: 2.8 MG/DL (ref 0.8–1.3)
CREAT SERPL-MCNC: 2.8 MG/DL (ref 0.8–1.3)
CREAT SERPL-MCNC: 3 MG/DL (ref 0.8–1.3)
CREAT SERPL-MCNC: 3 MG/DL (ref 0.8–1.3)
CREAT SERPL-MCNC: 3.1 MG/DL (ref 0.8–1.3)
CREAT SERPL-MCNC: 3.1 MG/DL (ref 0.8–1.3)
CREAT SERPL-MCNC: 3.2 MG/DL (ref 0.8–1.3)
CREAT SERPL-MCNC: 3.4 MG/DL (ref 0.8–1.3)
CREAT SERPL-MCNC: 3.4 MG/DL (ref 0.8–1.3)
CREAT SERPL-MCNC: 3.6 MG/DL (ref 0.8–1.3)
CREAT SERPL-MCNC: 3.7 MG/DL (ref 0.8–1.3)
CREAT SERPL-MCNC: 4 MG/DL (ref 0.8–1.3)
CREAT SERPL-MCNC: 4 MG/DL (ref 0.8–1.3)
CREAT SERPL-MCNC: 4.6 MG/DL (ref 0.8–1.3)
CREATININE URINE: 62.7 MG/DL (ref 39–259)
CULTURE, BLOOD 2: NORMAL
EKG ATRIAL RATE: 35 BPM
EKG ATRIAL RATE: 45 BPM
EKG ATRIAL RATE: 49 BPM
EKG ATRIAL RATE: 67 BPM
EKG ATRIAL RATE: 72 BPM
EKG DIAGNOSIS: NORMAL
EKG P AXIS: 245 DEGREES
EKG P AXIS: 50 DEGREES
EKG P-R INTERVAL: 188 MS
EKG P-R INTERVAL: 256 MS
EKG Q-T INTERVAL: 478 MS
EKG Q-T INTERVAL: 480 MS
EKG Q-T INTERVAL: 508 MS
EKG Q-T INTERVAL: 522 MS
EKG Q-T INTERVAL: 536 MS
EKG QRS DURATION: 186 MS
EKG QRS DURATION: 198 MS
EKG QRS DURATION: 210 MS
EKG QRS DURATION: 218 MS
EKG QRS DURATION: 226 MS
EKG QTC CALCULATION (BAZETT): 539 MS
EKG QTC CALCULATION (BAZETT): 547 MS
EKG QTC CALCULATION (BAZETT): 551 MS
EKG QTC CALCULATION (BAZETT): 556 MS
EKG QTC CALCULATION (BAZETT): 561 MS
EKG R AXIS: -55 DEGREES
EKG R AXIS: -60 DEGREES
EKG R AXIS: -61 DEGREES
EKG R AXIS: -62 DEGREES
EKG R AXIS: -76 DEGREES
EKG T AXIS: 103 DEGREES
EKG T AXIS: 105 DEGREES
EKG T AXIS: 118 DEGREES
EKG T AXIS: 121 DEGREES
EKG T AXIS: 72 DEGREES
EKG VENTRICULAR RATE: 61 BPM
EKG VENTRICULAR RATE: 67 BPM
EKG VENTRICULAR RATE: 72 BPM
EKG VENTRICULAR RATE: 78 BPM
EKG VENTRICULAR RATE: 83 BPM
EOSINOPHILS ABSOLUTE: 0 K/UL (ref 0–0.6)
EOSINOPHILS ABSOLUTE: 0.1 K/UL (ref 0–0.6)
EOSINOPHILS RELATIVE PERCENT: 0.1 %
EOSINOPHILS RELATIVE PERCENT: 0.1 %
EOSINOPHILS RELATIVE PERCENT: 0.2 %
EOSINOPHILS RELATIVE PERCENT: 0.4 %
EOSINOPHILS RELATIVE PERCENT: 0.7 %
EOSINOPHILS RELATIVE PERCENT: 0.7 %
EOSINOPHILS RELATIVE PERCENT: 0.8 %
EOSINOPHILS RELATIVE PERCENT: 0.8 %
EOSINOPHILS RELATIVE PERCENT: 1 %
EOSINOPHILS RELATIVE PERCENT: 1.1 %
EOSINOPHILS RELATIVE PERCENT: 1.2 %
EOSINOPHILS RELATIVE PERCENT: 1.4 %
EOSINOPHILS RELATIVE PERCENT: 1.6 %
EOSINOPHILS RELATIVE PERCENT: 1.8 %
EOSINOPHILS RELATIVE PERCENT: 2.1 %
EPITHELIAL CELLS, UA: ABNORMAL /HPF
GFR AFRICAN AMERICAN: 15
GFR AFRICAN AMERICAN: 18
GFR AFRICAN AMERICAN: 18
GFR AFRICAN AMERICAN: 19
GFR AFRICAN AMERICAN: 20
GFR AFRICAN AMERICAN: 21
GFR AFRICAN AMERICAN: 21
GFR AFRICAN AMERICAN: 23
GFR AFRICAN AMERICAN: 24
GFR AFRICAN AMERICAN: 24
GFR AFRICAN AMERICAN: 25
GFR AFRICAN AMERICAN: 25
GFR AFRICAN AMERICAN: 27
GFR AFRICAN AMERICAN: 27
GFR AFRICAN AMERICAN: 28
GFR AFRICAN AMERICAN: 28
GFR AFRICAN AMERICAN: 29
GFR AFRICAN AMERICAN: 29
GFR AFRICAN AMERICAN: 32
GFR AFRICAN AMERICAN: 32
GFR AFRICAN AMERICAN: 33
GFR AFRICAN AMERICAN: 35
GFR AFRICAN AMERICAN: 37
GFR AFRICAN AMERICAN: 39
GFR AFRICAN AMERICAN: 42
GFR AFRICAN AMERICAN: 44
GFR NON-AFRICAN AMERICAN: 12
GFR NON-AFRICAN AMERICAN: 15
GFR NON-AFRICAN AMERICAN: 15
GFR NON-AFRICAN AMERICAN: 16
GFR NON-AFRICAN AMERICAN: 18
GFR NON-AFRICAN AMERICAN: 18
GFR NON-AFRICAN AMERICAN: 19
GFR NON-AFRICAN AMERICAN: 20
GFR NON-AFRICAN AMERICAN: 22
GFR NON-AFRICAN AMERICAN: 22
GFR NON-AFRICAN AMERICAN: 23
GFR NON-AFRICAN AMERICAN: 23
GFR NON-AFRICAN AMERICAN: 24
GFR NON-AFRICAN AMERICAN: 24
GFR NON-AFRICAN AMERICAN: 26
GFR NON-AFRICAN AMERICAN: 26
GFR NON-AFRICAN AMERICAN: 28
GFR NON-AFRICAN AMERICAN: 29
GFR NON-AFRICAN AMERICAN: 31
GFR NON-AFRICAN AMERICAN: 32
GFR NON-AFRICAN AMERICAN: 34
GFR NON-AFRICAN AMERICAN: 37
GLOBULIN: 3.2 G/DL
GLOBULIN: 3.3 G/DL
GLOBULIN: 3.3 G/DL
GLOBULIN: 3.5 G/DL
GLOBULIN: 3.7 G/DL
GLOBULIN: 3.9 G/DL
GLUCOSE BLD-MCNC: 100 MG/DL (ref 70–99)
GLUCOSE BLD-MCNC: 103 MG/DL (ref 70–99)
GLUCOSE BLD-MCNC: 103 MG/DL (ref 70–99)
GLUCOSE BLD-MCNC: 104 MG/DL (ref 70–99)
GLUCOSE BLD-MCNC: 105 MG/DL (ref 70–99)
GLUCOSE BLD-MCNC: 105 MG/DL (ref 70–99)
GLUCOSE BLD-MCNC: 107 MG/DL (ref 70–99)
GLUCOSE BLD-MCNC: 108 MG/DL (ref 70–99)
GLUCOSE BLD-MCNC: 109 MG/DL (ref 70–99)
GLUCOSE BLD-MCNC: 109 MG/DL (ref 70–99)
GLUCOSE BLD-MCNC: 110 MG/DL (ref 70–99)
GLUCOSE BLD-MCNC: 110 MG/DL (ref 70–99)
GLUCOSE BLD-MCNC: 111 MG/DL (ref 70–99)
GLUCOSE BLD-MCNC: 111 MG/DL (ref 70–99)
GLUCOSE BLD-MCNC: 112 MG/DL (ref 70–99)
GLUCOSE BLD-MCNC: 112 MG/DL (ref 70–99)
GLUCOSE BLD-MCNC: 114 MG/DL (ref 70–99)
GLUCOSE BLD-MCNC: 115 MG/DL (ref 70–99)
GLUCOSE BLD-MCNC: 115 MG/DL (ref 70–99)
GLUCOSE BLD-MCNC: 116 MG/DL (ref 70–99)
GLUCOSE BLD-MCNC: 116 MG/DL (ref 70–99)
GLUCOSE BLD-MCNC: 117 MG/DL (ref 70–99)
GLUCOSE BLD-MCNC: 118 MG/DL (ref 70–99)
GLUCOSE BLD-MCNC: 120 MG/DL (ref 70–99)
GLUCOSE BLD-MCNC: 120 MG/DL (ref 70–99)
GLUCOSE BLD-MCNC: 121 MG/DL (ref 70–99)
GLUCOSE BLD-MCNC: 123 MG/DL (ref 70–99)
GLUCOSE BLD-MCNC: 124 MG/DL (ref 70–99)
GLUCOSE BLD-MCNC: 125 MG/DL (ref 70–99)
GLUCOSE BLD-MCNC: 128 MG/DL (ref 70–99)
GLUCOSE BLD-MCNC: 129 MG/DL (ref 70–99)
GLUCOSE BLD-MCNC: 131 MG/DL (ref 70–99)
GLUCOSE BLD-MCNC: 134 MG/DL (ref 70–99)
GLUCOSE BLD-MCNC: 135 MG/DL (ref 70–99)
GLUCOSE BLD-MCNC: 135 MG/DL (ref 70–99)
GLUCOSE BLD-MCNC: 136 MG/DL (ref 70–99)
GLUCOSE BLD-MCNC: 137 MG/DL (ref 70–99)
GLUCOSE BLD-MCNC: 138 MG/DL (ref 70–99)
GLUCOSE BLD-MCNC: 139 MG/DL (ref 70–99)
GLUCOSE BLD-MCNC: 139 MG/DL (ref 70–99)
GLUCOSE BLD-MCNC: 140 MG/DL (ref 70–99)
GLUCOSE BLD-MCNC: 140 MG/DL (ref 70–99)
GLUCOSE BLD-MCNC: 141 MG/DL (ref 70–99)
GLUCOSE BLD-MCNC: 141 MG/DL (ref 70–99)
GLUCOSE BLD-MCNC: 142 MG/DL (ref 70–99)
GLUCOSE BLD-MCNC: 143 MG/DL (ref 70–99)
GLUCOSE BLD-MCNC: 145 MG/DL (ref 70–99)
GLUCOSE BLD-MCNC: 147 MG/DL (ref 70–99)
GLUCOSE BLD-MCNC: 147 MG/DL (ref 70–99)
GLUCOSE BLD-MCNC: 148 MG/DL (ref 70–99)
GLUCOSE BLD-MCNC: 149 MG/DL (ref 70–99)
GLUCOSE BLD-MCNC: 150 MG/DL (ref 70–99)
GLUCOSE BLD-MCNC: 150 MG/DL (ref 70–99)
GLUCOSE BLD-MCNC: 151 MG/DL (ref 70–99)
GLUCOSE BLD-MCNC: 152 MG/DL (ref 70–99)
GLUCOSE BLD-MCNC: 153 MG/DL (ref 70–99)
GLUCOSE BLD-MCNC: 153 MG/DL (ref 70–99)
GLUCOSE BLD-MCNC: 154 MG/DL (ref 70–99)
GLUCOSE BLD-MCNC: 155 MG/DL (ref 70–99)
GLUCOSE BLD-MCNC: 155 MG/DL (ref 70–99)
GLUCOSE BLD-MCNC: 156 MG/DL (ref 70–99)
GLUCOSE BLD-MCNC: 156 MG/DL (ref 70–99)
GLUCOSE BLD-MCNC: 157 MG/DL (ref 70–99)
GLUCOSE BLD-MCNC: 158 MG/DL (ref 70–99)
GLUCOSE BLD-MCNC: 158 MG/DL (ref 70–99)
GLUCOSE BLD-MCNC: 160 MG/DL (ref 70–99)
GLUCOSE BLD-MCNC: 162 MG/DL (ref 70–99)
GLUCOSE BLD-MCNC: 162 MG/DL (ref 70–99)
GLUCOSE BLD-MCNC: 163 MG/DL (ref 70–99)
GLUCOSE BLD-MCNC: 163 MG/DL (ref 70–99)
GLUCOSE BLD-MCNC: 164 MG/DL (ref 70–99)
GLUCOSE BLD-MCNC: 164 MG/DL (ref 70–99)
GLUCOSE BLD-MCNC: 166 MG/DL (ref 70–99)
GLUCOSE BLD-MCNC: 166 MG/DL (ref 70–99)
GLUCOSE BLD-MCNC: 167 MG/DL (ref 70–99)
GLUCOSE BLD-MCNC: 169 MG/DL (ref 70–99)
GLUCOSE BLD-MCNC: 169 MG/DL (ref 70–99)
GLUCOSE BLD-MCNC: 171 MG/DL (ref 70–99)
GLUCOSE BLD-MCNC: 171 MG/DL (ref 70–99)
GLUCOSE BLD-MCNC: 173 MG/DL (ref 70–99)
GLUCOSE BLD-MCNC: 175 MG/DL (ref 70–99)
GLUCOSE BLD-MCNC: 175 MG/DL (ref 70–99)
GLUCOSE BLD-MCNC: 176 MG/DL (ref 70–99)
GLUCOSE BLD-MCNC: 178 MG/DL (ref 70–99)
GLUCOSE BLD-MCNC: 180 MG/DL (ref 70–99)
GLUCOSE BLD-MCNC: 181 MG/DL (ref 70–99)
GLUCOSE BLD-MCNC: 183 MG/DL (ref 70–99)
GLUCOSE BLD-MCNC: 185 MG/DL (ref 70–99)
GLUCOSE BLD-MCNC: 188 MG/DL (ref 70–99)
GLUCOSE BLD-MCNC: 188 MG/DL (ref 70–99)
GLUCOSE BLD-MCNC: 191 MG/DL (ref 70–99)
GLUCOSE BLD-MCNC: 193 MG/DL (ref 70–99)
GLUCOSE BLD-MCNC: 195 MG/DL (ref 70–99)
GLUCOSE BLD-MCNC: 195 MG/DL (ref 70–99)
GLUCOSE BLD-MCNC: 197 MG/DL (ref 70–99)
GLUCOSE BLD-MCNC: 200 MG/DL (ref 70–99)
GLUCOSE BLD-MCNC: 203 MG/DL (ref 70–99)
GLUCOSE BLD-MCNC: 203 MG/DL (ref 70–99)
GLUCOSE BLD-MCNC: 212 MG/DL (ref 70–99)
GLUCOSE BLD-MCNC: 212 MG/DL (ref 70–99)
GLUCOSE BLD-MCNC: 217 MG/DL (ref 70–99)
GLUCOSE BLD-MCNC: 220 MG/DL (ref 70–99)
GLUCOSE BLD-MCNC: 225 MG/DL (ref 70–99)
GLUCOSE BLD-MCNC: 226 MG/DL (ref 70–99)
GLUCOSE BLD-MCNC: 226 MG/DL (ref 70–99)
GLUCOSE BLD-MCNC: 233 MG/DL (ref 70–99)
GLUCOSE BLD-MCNC: 242 MG/DL (ref 70–99)
GLUCOSE BLD-MCNC: 246 MG/DL (ref 70–99)
GLUCOSE BLD-MCNC: 41 MG/DL (ref 70–99)
GLUCOSE BLD-MCNC: 51 MG/DL (ref 70–99)
GLUCOSE BLD-MCNC: 54 MG/DL (ref 70–99)
GLUCOSE BLD-MCNC: 62 MG/DL (ref 70–99)
GLUCOSE BLD-MCNC: 64 MG/DL (ref 70–99)
GLUCOSE BLD-MCNC: 67 MG/DL (ref 70–99)
GLUCOSE BLD-MCNC: 69 MG/DL (ref 70–99)
GLUCOSE BLD-MCNC: 70 MG/DL (ref 70–99)
GLUCOSE BLD-MCNC: 72 MG/DL (ref 70–99)
GLUCOSE BLD-MCNC: 73 MG/DL (ref 70–99)
GLUCOSE BLD-MCNC: 74 MG/DL (ref 70–99)
GLUCOSE BLD-MCNC: 76 MG/DL (ref 70–99)
GLUCOSE BLD-MCNC: 79 MG/DL (ref 70–99)
GLUCOSE BLD-MCNC: 79 MG/DL (ref 70–99)
GLUCOSE BLD-MCNC: 83 MG/DL (ref 70–99)
GLUCOSE BLD-MCNC: 83 MG/DL (ref 70–99)
GLUCOSE BLD-MCNC: 84 MG/DL (ref 70–99)
GLUCOSE BLD-MCNC: 85 MG/DL (ref 70–99)
GLUCOSE BLD-MCNC: 86 MG/DL (ref 70–99)
GLUCOSE BLD-MCNC: 87 MG/DL (ref 70–99)
GLUCOSE BLD-MCNC: 92 MG/DL (ref 70–99)
GLUCOSE BLD-MCNC: 93 MG/DL (ref 70–99)
GLUCOSE BLD-MCNC: 94 MG/DL (ref 70–99)
GLUCOSE BLD-MCNC: 95 MG/DL (ref 70–99)
GLUCOSE BLD-MCNC: 96 MG/DL (ref 70–99)
GLUCOSE BLD-MCNC: 96 MG/DL (ref 70–99)
GLUCOSE BLD-MCNC: 97 MG/DL (ref 70–99)
GLUCOSE BLD-MCNC: 97 MG/DL (ref 70–99)
GLUCOSE BLD-MCNC: 99 MG/DL (ref 70–99)
GLUCOSE FASTING: 144 MG/DL (ref 70–99)
GLUCOSE URINE: 100 MG/DL
GLUCOSE URINE: 100 MG/DL
GLUCOSE URINE: NEGATIVE MG/DL
HBV SURFACE AB TITR SER: <3.5 MIU/ML
HCO3 ARTERIAL: 17.7 MMOL/L (ref 21–29)
HCO3 ARTERIAL: 21.7 MMOL/L (ref 21–29)
HCO3 VENOUS: 27.1 MMOL/L (ref 23–29)
HCT VFR BLD CALC: 33.6 % (ref 40.5–52.5)
HCT VFR BLD CALC: 35.6 % (ref 40.5–52.5)
HCT VFR BLD CALC: 35.9 % (ref 40.5–52.5)
HCT VFR BLD CALC: 36.1 % (ref 40.5–52.5)
HCT VFR BLD CALC: 36.2 % (ref 40.5–52.5)
HCT VFR BLD CALC: 36.3 % (ref 40.5–52.5)
HCT VFR BLD CALC: 36.6 % (ref 40.5–52.5)
HCT VFR BLD CALC: 36.9 % (ref 40.5–52.5)
HCT VFR BLD CALC: 37 % (ref 40.5–52.5)
HCT VFR BLD CALC: 37 % (ref 40.5–52.5)
HCT VFR BLD CALC: 37.1 % (ref 40.5–52.5)
HCT VFR BLD CALC: 37.3 % (ref 40.5–52.5)
HCT VFR BLD CALC: 37.3 % (ref 40.5–52.5)
HCT VFR BLD CALC: 37.6 % (ref 40.5–52.5)
HCT VFR BLD CALC: 37.6 % (ref 40.5–52.5)
HCT VFR BLD CALC: 37.8 % (ref 40.5–52.5)
HCT VFR BLD CALC: 37.8 % (ref 40.5–52.5)
HCT VFR BLD CALC: 38.1 % (ref 40.5–52.5)
HCT VFR BLD CALC: 38.2 % (ref 40.5–52.5)
HCT VFR BLD CALC: 38.2 % (ref 40.5–52.5)
HCT VFR BLD CALC: 38.4 % (ref 40.5–52.5)
HCT VFR BLD CALC: 39.5 % (ref 40.5–52.5)
HCT VFR BLD CALC: 39.6 % (ref 40.5–52.5)
HCT VFR BLD CALC: 39.7 % (ref 40.5–52.5)
HCT VFR BLD CALC: 40.3 % (ref 40.5–52.5)
HCT VFR BLD CALC: 41.4 % (ref 40.5–52.5)
HCT VFR BLD CALC: 41.6 % (ref 40.5–52.5)
HCT VFR BLD CALC: 42.2 % (ref 40.5–52.5)
HDLC SERPL-MCNC: 30 MG/DL (ref 40–60)
HEMOGLOBIN, ART, EXTENDED: 12.2 G/DL (ref 13.5–17.5)
HEMOGLOBIN: 10.9 G/DL (ref 13.5–17.5)
HEMOGLOBIN: 11.1 G/DL (ref 13.5–17.5)
HEMOGLOBIN: 11.3 G/DL (ref 13.5–17.5)
HEMOGLOBIN: 11.5 G/DL (ref 13.5–17.5)
HEMOGLOBIN: 11.5 G/DL (ref 13.5–17.5)
HEMOGLOBIN: 11.6 G/DL (ref 13.5–17.5)
HEMOGLOBIN: 11.7 G/DL (ref 13.5–17.5)
HEMOGLOBIN: 11.8 G/DL (ref 13.5–17.5)
HEMOGLOBIN: 11.9 G/DL (ref 13.5–17.5)
HEMOGLOBIN: 12.1 G/DL (ref 13.5–17.5)
HEMOGLOBIN: 12.2 G/DL (ref 13.5–17.5)
HEMOGLOBIN: 12.3 G/DL (ref 13.5–17.5)
HEMOGLOBIN: 12.3 G/DL (ref 13.5–17.5)
HEMOGLOBIN: 12.5 G/DL (ref 13.5–17.5)
HEMOGLOBIN: 12.5 G/DL (ref 13.5–17.5)
HEMOGLOBIN: 12.6 G/DL (ref 13.5–17.5)
HEMOGLOBIN: 12.8 G/DL (ref 13.5–17.5)
HEMOGLOBIN: 12.9 G/DL (ref 13.5–17.5)
HEMOGLOBIN: 13.1 G/DL (ref 13.5–17.5)
HEMOGLOBIN: 13.5 G/DL (ref 13.5–17.5)
HEMOGLOBIN: 13.7 G/DL (ref 13.5–17.5)
HEPATITIS B CORE TOTAL ANTIBODY: NEGATIVE
HEPATITIS B SURFACE ANTIGEN INTERPRETATION: NORMAL
INR BLD: 1.11 (ref 0.86–1.14)
INR BLD: 1.18 (ref 0.86–1.14)
INR BLD: 1.18 (ref 0.86–1.14)
INR BLD: 1.21 (ref 0.86–1.14)
INR BLD: 1.32 (ref 0.86–1.14)
INR BLD: 1.39 (ref 0.86–1.14)
INR BLD: 1.53 (ref 0.85–1.15)
INR BLD: 1.62 (ref 0.86–1.14)
INR BLD: 1.66 (ref 0.86–1.14)
INR BLD: 1.74 (ref 0.86–1.14)
INR BLD: 1.76 (ref 0.86–1.14)
INR BLD: 1.81 (ref 0.86–1.14)
INR BLD: 1.82 (ref 0.86–1.14)
INR BLD: 1.84 (ref 0.86–1.14)
INR BLD: 1.91 (ref 0.86–1.14)
INR BLD: 10.46 (ref 0.86–1.14)
INR BLD: 2.08 (ref 0.86–1.14)
INR BLD: 2.2
INR BLD: 2.2 (ref 0.86–1.14)
INR BLD: 2.24 (ref 0.86–1.14)
INR BLD: 2.26 (ref 0.86–1.14)
INR BLD: 2.3 (ref 0.86–1.14)
INR BLD: 2.32 (ref 0.86–1.14)
INR BLD: 2.33 (ref 0.86–1.14)
INR BLD: 2.34 (ref 0.86–1.14)
INR BLD: 2.4
INR BLD: 2.63 (ref 0.86–1.14)
INR BLD: 2.65 (ref 0.86–1.14)
INR BLD: 2.67 (ref 0.86–1.14)
INR BLD: 3.53 (ref 0.86–1.14)
INR BLD: 4.08 (ref 0.86–1.14)
INR BLD: 4.16 (ref 0.86–1.14)
INR BLD: 4.25 (ref 0.86–1.14)
INR BLD: 4.68 (ref 0.86–1.14)
INR BLD: 9.68 (ref 0.86–1.14)
KETONES, URINE: ABNORMAL MG/DL
KETONES, URINE: NEGATIVE MG/DL
KETONES, URINE: NEGATIVE MG/DL
LACTIC ACID, SEPSIS: 1.5 MMOL/L (ref 0.4–1.9)
LACTIC ACID: 1 MMOL/L (ref 0.4–2)
LACTIC ACID: 1.2 MMOL/L (ref 0.4–2)
LACTIC ACID: 1.2 MMOL/L (ref 0.4–2)
LACTIC ACID: 1.3 MMOL/L (ref 0.4–2)
LACTIC ACID: 1.8 MMOL/L (ref 0.4–2)
LDL CHOLESTEROL CALCULATED: 108 MG/DL
LEUKOCYTE ESTERASE, URINE: ABNORMAL
LIPASE: 15 U/L (ref 13–60)
LIPASE: 16 U/L (ref 13–60)
LYMPHOCYTES ABSOLUTE: 0.3 K/UL (ref 1–5.1)
LYMPHOCYTES ABSOLUTE: 0.4 K/UL (ref 1–5.1)
LYMPHOCYTES ABSOLUTE: 0.5 K/UL (ref 1–5.1)
LYMPHOCYTES ABSOLUTE: 0.6 K/UL (ref 1–5.1)
LYMPHOCYTES ABSOLUTE: 0.6 K/UL (ref 1–5.1)
LYMPHOCYTES ABSOLUTE: 0.7 K/UL (ref 1–5.1)
LYMPHOCYTES ABSOLUTE: 0.7 K/UL (ref 1–5.1)
LYMPHOCYTES RELATIVE PERCENT: 5.5 %
LYMPHOCYTES RELATIVE PERCENT: 5.9 %
LYMPHOCYTES RELATIVE PERCENT: 6.1 %
LYMPHOCYTES RELATIVE PERCENT: 6.5 %
LYMPHOCYTES RELATIVE PERCENT: 6.7 %
LYMPHOCYTES RELATIVE PERCENT: 7 %
LYMPHOCYTES RELATIVE PERCENT: 7.1 %
LYMPHOCYTES RELATIVE PERCENT: 7.2 %
LYMPHOCYTES RELATIVE PERCENT: 7.4 %
LYMPHOCYTES RELATIVE PERCENT: 7.8 %
LYMPHOCYTES RELATIVE PERCENT: 8.9 %
LYMPHOCYTES RELATIVE PERCENT: 9 %
LYMPHOCYTES RELATIVE PERCENT: 9.2 %
MAGNESIUM: 1.9 MG/DL (ref 1.8–2.4)
MAGNESIUM: 2 MG/DL (ref 1.8–2.4)
MAGNESIUM: 2 MG/DL (ref 1.8–2.4)
MAGNESIUM: 2.1 MG/DL (ref 1.8–2.4)
MAGNESIUM: 2.2 MG/DL (ref 1.8–2.4)
MCH RBC QN AUTO: 28.6 PG (ref 26–34)
MCH RBC QN AUTO: 30.1 PG (ref 26–34)
MCH RBC QN AUTO: 30.4 PG (ref 26–34)
MCH RBC QN AUTO: 30.6 PG (ref 26–34)
MCH RBC QN AUTO: 30.7 PG (ref 26–34)
MCH RBC QN AUTO: 30.8 PG (ref 26–34)
MCH RBC QN AUTO: 30.9 PG (ref 26–34)
MCH RBC QN AUTO: 31 PG (ref 26–34)
MCH RBC QN AUTO: 31.1 PG (ref 26–34)
MCH RBC QN AUTO: 31.1 PG (ref 26–34)
MCH RBC QN AUTO: 31.2 PG (ref 26–34)
MCH RBC QN AUTO: 31.2 PG (ref 26–34)
MCH RBC QN AUTO: 31.3 PG (ref 26–34)
MCH RBC QN AUTO: 31.3 PG (ref 26–34)
MCH RBC QN AUTO: 31.4 PG (ref 26–34)
MCH RBC QN AUTO: 31.5 PG (ref 26–34)
MCH RBC QN AUTO: 31.6 PG (ref 26–34)
MCH RBC QN AUTO: 31.8 PG (ref 26–34)
MCHC RBC AUTO-ENTMCNC: 30 G/DL (ref 31–36)
MCHC RBC AUTO-ENTMCNC: 30.4 G/DL (ref 31–36)
MCHC RBC AUTO-ENTMCNC: 30.7 G/DL (ref 31–36)
MCHC RBC AUTO-ENTMCNC: 30.8 G/DL (ref 31–36)
MCHC RBC AUTO-ENTMCNC: 31.3 G/DL (ref 31–36)
MCHC RBC AUTO-ENTMCNC: 31.6 G/DL (ref 31–36)
MCHC RBC AUTO-ENTMCNC: 31.7 G/DL (ref 31–36)
MCHC RBC AUTO-ENTMCNC: 31.8 G/DL (ref 31–36)
MCHC RBC AUTO-ENTMCNC: 32 G/DL (ref 31–36)
MCHC RBC AUTO-ENTMCNC: 32 G/DL (ref 31–36)
MCHC RBC AUTO-ENTMCNC: 32.1 G/DL (ref 31–36)
MCHC RBC AUTO-ENTMCNC: 32.2 G/DL (ref 31–36)
MCHC RBC AUTO-ENTMCNC: 32.5 G/DL (ref 31–36)
MCHC RBC AUTO-ENTMCNC: 32.6 G/DL (ref 31–36)
MCHC RBC AUTO-ENTMCNC: 32.6 G/DL (ref 31–36)
MCHC RBC AUTO-ENTMCNC: 32.7 G/DL (ref 31–36)
MCHC RBC AUTO-ENTMCNC: 33 G/DL (ref 31–36)
MCHC RBC AUTO-ENTMCNC: 33.1 G/DL (ref 31–36)
MCHC RBC AUTO-ENTMCNC: 33.1 G/DL (ref 31–36)
MCHC RBC AUTO-ENTMCNC: 33.3 G/DL (ref 31–36)
MCV RBC AUTO: 101.6 FL (ref 80–100)
MCV RBC AUTO: 103.1 FL (ref 80–100)
MCV RBC AUTO: 103.3 FL (ref 80–100)
MCV RBC AUTO: 106 FL (ref 80–100)
MCV RBC AUTO: 90.1 FL (ref 80–100)
MCV RBC AUTO: 93 FL (ref 80–100)
MCV RBC AUTO: 93.2 FL (ref 80–100)
MCV RBC AUTO: 93.2 FL (ref 80–100)
MCV RBC AUTO: 94.3 FL (ref 80–100)
MCV RBC AUTO: 94.3 FL (ref 80–100)
MCV RBC AUTO: 94.5 FL (ref 80–100)
MCV RBC AUTO: 94.5 FL (ref 80–100)
MCV RBC AUTO: 94.6 FL (ref 80–100)
MCV RBC AUTO: 94.9 FL (ref 80–100)
MCV RBC AUTO: 94.9 FL (ref 80–100)
MCV RBC AUTO: 95 FL (ref 80–100)
MCV RBC AUTO: 95.1 FL (ref 80–100)
MCV RBC AUTO: 95.1 FL (ref 80–100)
MCV RBC AUTO: 95.4 FL (ref 80–100)
MCV RBC AUTO: 95.4 FL (ref 80–100)
MCV RBC AUTO: 95.8 FL (ref 80–100)
MCV RBC AUTO: 95.9 FL (ref 80–100)
MCV RBC AUTO: 96.1 FL (ref 80–100)
MCV RBC AUTO: 96.1 FL (ref 80–100)
MCV RBC AUTO: 96.3 FL (ref 80–100)
MCV RBC AUTO: 96.8 FL (ref 80–100)
MCV RBC AUTO: 97.7 FL (ref 80–100)
MCV RBC AUTO: 98 FL (ref 80–100)
MCV RBC AUTO: 99.6 FL (ref 80–100)
MCV RBC AUTO: 99.8 FL (ref 80–100)
METHEMOGLOBIN ARTERIAL: 0.1 %
METHEMOGLOBIN VENOUS: 0.2 %
MICROSCOPIC EXAMINATION: YES
MONOCYTES ABSOLUTE: 0.4 K/UL (ref 0–1.3)
MONOCYTES ABSOLUTE: 0.5 K/UL (ref 0–1.3)
MONOCYTES ABSOLUTE: 0.5 K/UL (ref 0–1.3)
MONOCYTES ABSOLUTE: 0.6 K/UL (ref 0–1.3)
MONOCYTES ABSOLUTE: 0.7 K/UL (ref 0–1.3)
MONOCYTES ABSOLUTE: 0.8 K/UL (ref 0–1.3)
MONOCYTES RELATIVE PERCENT: 10.4 %
MONOCYTES RELATIVE PERCENT: 10.4 %
MONOCYTES RELATIVE PERCENT: 10.6 %
MONOCYTES RELATIVE PERCENT: 10.6 %
MONOCYTES RELATIVE PERCENT: 10.7 %
MONOCYTES RELATIVE PERCENT: 10.7 %
MONOCYTES RELATIVE PERCENT: 10.9 %
MONOCYTES RELATIVE PERCENT: 11.3 %
MONOCYTES RELATIVE PERCENT: 11.3 %
MONOCYTES RELATIVE PERCENT: 7 %
MONOCYTES RELATIVE PERCENT: 8.2 %
MONOCYTES RELATIVE PERCENT: 8.3 %
MONOCYTES RELATIVE PERCENT: 9.3 %
MONOCYTES RELATIVE PERCENT: 9.4 %
MONOCYTES RELATIVE PERCENT: 9.5 %
MONOCYTES RELATIVE PERCENT: 9.6 %
MONOCYTES RELATIVE PERCENT: 9.8 %
MUCUS: ABNORMAL /LPF
NEUTROPHILS ABSOLUTE: 3.9 K/UL (ref 1.7–7.7)
NEUTROPHILS ABSOLUTE: 4 K/UL (ref 1.7–7.7)
NEUTROPHILS ABSOLUTE: 4.2 K/UL (ref 1.7–7.7)
NEUTROPHILS ABSOLUTE: 4.6 K/UL (ref 1.7–7.7)
NEUTROPHILS ABSOLUTE: 4.7 K/UL (ref 1.7–7.7)
NEUTROPHILS ABSOLUTE: 4.8 K/UL (ref 1.7–7.7)
NEUTROPHILS ABSOLUTE: 4.9 K/UL (ref 1.7–7.7)
NEUTROPHILS ABSOLUTE: 5.3 K/UL (ref 1.7–7.7)
NEUTROPHILS ABSOLUTE: 5.4 K/UL (ref 1.7–7.7)
NEUTROPHILS ABSOLUTE: 5.6 K/UL (ref 1.7–7.7)
NEUTROPHILS ABSOLUTE: 5.6 K/UL (ref 1.7–7.7)
NEUTROPHILS ABSOLUTE: 5.7 K/UL (ref 1.7–7.7)
NEUTROPHILS ABSOLUTE: 6.2 K/UL (ref 1.7–7.7)
NEUTROPHILS ABSOLUTE: 6.3 K/UL (ref 1.7–7.7)
NEUTROPHILS ABSOLUTE: 6.7 K/UL (ref 1.7–7.7)
NEUTROPHILS ABSOLUTE: 7 K/UL (ref 1.7–7.7)
NEUTROPHILS ABSOLUTE: 7.1 K/UL (ref 1.7–7.7)
NEUTROPHILS RELATIVE PERCENT: 78.8 %
NEUTROPHILS RELATIVE PERCENT: 78.9 %
NEUTROPHILS RELATIVE PERCENT: 78.9 %
NEUTROPHILS RELATIVE PERCENT: 79.5 %
NEUTROPHILS RELATIVE PERCENT: 79.6 %
NEUTROPHILS RELATIVE PERCENT: 79.9 %
NEUTROPHILS RELATIVE PERCENT: 80.4 %
NEUTROPHILS RELATIVE PERCENT: 81 %
NEUTROPHILS RELATIVE PERCENT: 81.9 %
NEUTROPHILS RELATIVE PERCENT: 82.2 %
NEUTROPHILS RELATIVE PERCENT: 82.4 %
NEUTROPHILS RELATIVE PERCENT: 82.4 %
NEUTROPHILS RELATIVE PERCENT: 82.8 %
NEUTROPHILS RELATIVE PERCENT: 83.1 %
NEUTROPHILS RELATIVE PERCENT: 83.1 %
NEUTROPHILS RELATIVE PERCENT: 83.4 %
NEUTROPHILS RELATIVE PERCENT: 84.8 %
NITRITE, URINE: NEGATIVE
NITRITE, URINE: POSITIVE
O2 CONTENT ARTERIAL: 14 ML/DL
O2 CONTENT, VEN: 13 VOL %
O2 SAT, ARTERIAL: 81.6 %
O2 SAT, ARTERIAL: 99 % (ref 93–100)
O2 SAT, VEN: 72 %
O2 THERAPY: ABNORMAL
O2 THERAPY: ABNORMAL
ORGANISM: ABNORMAL
PARATHYROID HORMONE INTACT: 341 PG/ML (ref 14–72)
PCO2 ARTERIAL: 31.2 MM HG (ref 35–45)
PCO2 ARTERIAL: 63.7 MMHG (ref 35–45)
PCO2, VEN: 57.7 MMHG (ref 40–50)
PDW BLD-RTO: 17.2 % (ref 12.4–15.4)
PDW BLD-RTO: 17.3 % (ref 12.4–15.4)
PDW BLD-RTO: 17.9 % (ref 12.4–15.4)
PDW BLD-RTO: 18 % (ref 12.4–15.4)
PDW BLD-RTO: 18 % (ref 12.4–15.4)
PDW BLD-RTO: 18.2 % (ref 12.4–15.4)
PDW BLD-RTO: 18.3 % (ref 12.4–15.4)
PDW BLD-RTO: 18.5 % (ref 12.4–15.4)
PDW BLD-RTO: 18.6 % (ref 12.4–15.4)
PDW BLD-RTO: 18.7 % (ref 12.4–15.4)
PDW BLD-RTO: 18.9 % (ref 12.4–15.4)
PDW BLD-RTO: 19.1 % (ref 12.4–15.4)
PDW BLD-RTO: 19.4 % (ref 12.4–15.4)
PDW BLD-RTO: 19.5 % (ref 12.4–15.4)
PDW BLD-RTO: 19.6 % (ref 12.4–15.4)
PDW BLD-RTO: 19.9 % (ref 12.4–15.4)
PDW BLD-RTO: 20 % (ref 12.4–15.4)
PDW BLD-RTO: 20.2 % (ref 12.4–15.4)
PDW BLD-RTO: 20.5 % (ref 12.4–15.4)
PDW BLD-RTO: 20.8 % (ref 12.4–15.4)
PDW BLD-RTO: 20.9 % (ref 12.4–15.4)
PDW BLD-RTO: 21.4 % (ref 12.4–15.4)
PERFORMED ON: ABNORMAL
PERFORMED ON: NORMAL
PH ARTERIAL: 7.15 (ref 7.35–7.45)
PH ARTERIAL: 7.36 (ref 7.35–7.45)
PH UA: 5
PH UA: 6
PH UA: 6
PH UA: 6.5
PH UA: 7
PH VENOUS: 7.29 (ref 7.35–7.45)
PHOSPHORUS: 3.6 MG/DL (ref 2.5–4.9)
PHOSPHORUS: 3.9 MG/DL (ref 2.5–4.9)
PHOSPHORUS: 4 MG/DL (ref 2.5–4.9)
PHOSPHORUS: 4.1 MG/DL (ref 2.5–4.9)
PHOSPHORUS: 4.5 MG/DL (ref 2.5–4.9)
PLATELET # BLD: 102 K/UL (ref 135–450)
PLATELET # BLD: 104 K/UL (ref 135–450)
PLATELET # BLD: 107 K/UL (ref 135–450)
PLATELET # BLD: 111 K/UL (ref 135–450)
PLATELET # BLD: 111 K/UL (ref 135–450)
PLATELET # BLD: 112 K/UL (ref 135–450)
PLATELET # BLD: 113 K/UL (ref 135–450)
PLATELET # BLD: 116 K/UL (ref 135–450)
PLATELET # BLD: 118 K/UL (ref 135–450)
PLATELET # BLD: 119 K/UL (ref 135–450)
PLATELET # BLD: 119 K/UL (ref 135–450)
PLATELET # BLD: 121 K/UL (ref 135–450)
PLATELET # BLD: 123 K/UL (ref 135–450)
PLATELET # BLD: 125 K/UL (ref 135–450)
PLATELET # BLD: 132 K/UL (ref 135–450)
PLATELET # BLD: 135 K/UL (ref 135–450)
PLATELET # BLD: 136 K/UL (ref 135–450)
PLATELET # BLD: 139 K/UL (ref 135–450)
PLATELET # BLD: 149 K/UL (ref 135–450)
PLATELET # BLD: 157 K/UL (ref 135–450)
PLATELET # BLD: 159 K/UL (ref 135–450)
PLATELET # BLD: 176 K/UL (ref 135–450)
PLATELET # BLD: 90 K/UL (ref 135–450)
PLATELET # BLD: 97 K/UL (ref 135–450)
PLATELET # BLD: 97 K/UL (ref 135–450)
PLATELET # BLD: 98 K/UL (ref 135–450)
PLATELET SLIDE REVIEW: ABNORMAL
PMV BLD AUTO: 7.5 FL (ref 5–10.5)
PMV BLD AUTO: 7.6 FL (ref 5–10.5)
PMV BLD AUTO: 7.6 FL (ref 5–10.5)
PMV BLD AUTO: 7.7 FL (ref 5–10.5)
PMV BLD AUTO: 7.8 FL (ref 5–10.5)
PMV BLD AUTO: 7.8 FL (ref 5–10.5)
PMV BLD AUTO: 7.9 FL (ref 5–10.5)
PMV BLD AUTO: 8 FL (ref 5–10.5)
PMV BLD AUTO: 8 FL (ref 5–10.5)
PMV BLD AUTO: 8.1 FL (ref 5–10.5)
PMV BLD AUTO: 8.3 FL (ref 5–10.5)
PMV BLD AUTO: 8.3 FL (ref 5–10.5)
PMV BLD AUTO: 8.4 FL (ref 5–10.5)
PMV BLD AUTO: 8.5 FL (ref 5–10.5)
PMV BLD AUTO: 8.5 FL (ref 5–10.5)
PMV BLD AUTO: 8.6 FL (ref 5–10.5)
PMV BLD AUTO: 8.6 FL (ref 5–10.5)
PMV BLD AUTO: 8.7 FL (ref 5–10.5)
PMV BLD AUTO: 8.8 FL (ref 5–10.5)
PMV BLD AUTO: 9.6 FL (ref 5–10.5)
PO2 ARTERIAL: 124.1 MM HG (ref 75–108)
PO2 ARTERIAL: 58.7 MMHG (ref 75–108)
PO2, VEN: 42.6 MMHG (ref 25–40)
POC SAMPLE TYPE: ABNORMAL
POTASSIUM REFLEX MAGNESIUM: 4.6 MMOL/L (ref 3.5–5.1)
POTASSIUM REFLEX MAGNESIUM: 6.1 MMOL/L (ref 3.5–5.1)
POTASSIUM SERPL-SCNC: 3.5 MMOL/L (ref 3.5–5.1)
POTASSIUM SERPL-SCNC: 3.6 MMOL/L (ref 3.5–5.1)
POTASSIUM SERPL-SCNC: 3.7 MMOL/L (ref 3.5–5.1)
POTASSIUM SERPL-SCNC: 3.7 MMOL/L (ref 3.5–5.1)
POTASSIUM SERPL-SCNC: 3.8 MMOL/L (ref 3.5–5.1)
POTASSIUM SERPL-SCNC: 3.9 MMOL/L (ref 3.5–5.1)
POTASSIUM SERPL-SCNC: 4 MMOL/L (ref 3.5–5.1)
POTASSIUM SERPL-SCNC: 4.1 MMOL/L (ref 3.5–5.1)
POTASSIUM SERPL-SCNC: 4.2 MMOL/L (ref 3.5–5.1)
POTASSIUM SERPL-SCNC: 4.4 MMOL/L (ref 3.5–5.1)
POTASSIUM SERPL-SCNC: 4.5 MMOL/L (ref 3.5–5.1)
POTASSIUM SERPL-SCNC: 4.6 MMOL/L (ref 3.5–5.1)
POTASSIUM SERPL-SCNC: 4.7 MMOL/L (ref 3.5–5.1)
POTASSIUM SERPL-SCNC: 4.8 MMOL/L (ref 3.5–5.1)
POTASSIUM SERPL-SCNC: 5 MMOL/L (ref 3.5–5.1)
POTASSIUM SERPL-SCNC: 5.1 MMOL/L (ref 3.5–5.1)
POTASSIUM SERPL-SCNC: 5.2 MMOL/L (ref 3.5–5.1)
POTASSIUM SERPL-SCNC: 5.9 MMOL/L (ref 3.5–5.1)
POTASSIUM SERPL-SCNC: 6.1 MMOL/L (ref 3.5–5.1)
PRO-BNP: ABNORMAL PG/ML (ref 0–449)
PRO-BNP: ABNORMAL PG/ML (ref 0–449)
PROCALCITONIN: 0.31 NG/ML (ref 0–0.15)
PROTEIN PROTEIN: 132 MG/DL
PROTEIN UA: 100 MG/DL
PROTEIN UA: >=300 MG/DL
PROTEIN UA: >=300 MG/DL
PROTHROMBIN TIME: 110.4 SEC (ref 9.8–13)
PROTHROMBIN TIME: 119.3 SEC (ref 9.8–13)
PROTHROMBIN TIME: 12.7 SEC (ref 9.8–13)
PROTHROMBIN TIME: 13.4 SEC (ref 9.8–13)
PROTHROMBIN TIME: 13.5 SEC (ref 9.8–13)
PROTHROMBIN TIME: 13.8 SEC (ref 9.8–13)
PROTHROMBIN TIME: 15 SEC (ref 9.8–13)
PROTHROMBIN TIME: 15.8 SEC (ref 9.8–13)
PROTHROMBIN TIME: 17.3 SEC (ref 9.6–13)
PROTHROMBIN TIME: 18.5 SEC (ref 9.8–13)
PROTHROMBIN TIME: 18.9 SEC (ref 9.8–13)
PROTHROMBIN TIME: 19.8 SEC (ref 9.8–13)
PROTHROMBIN TIME: 20.1 SEC (ref 9.8–13)
PROTHROMBIN TIME: 20.6 SEC (ref 9.8–13)
PROTHROMBIN TIME: 20.7 SEC (ref 9.8–13)
PROTHROMBIN TIME: 21 SEC (ref 9.8–13)
PROTHROMBIN TIME: 21.8 SEC (ref 9.8–13)
PROTHROMBIN TIME: 23.7 SEC (ref 9.8–13)
PROTHROMBIN TIME: 25.1 SEC (ref 9.8–13)
PROTHROMBIN TIME: 25.5 SEC (ref 9.8–13)
PROTHROMBIN TIME: 25.8 SEC (ref 9.8–13)
PROTHROMBIN TIME: 26.2 SEC (ref 9.8–13)
PROTHROMBIN TIME: 26.5 SEC (ref 9.8–13)
PROTHROMBIN TIME: 26.6 SEC (ref 9.8–13)
PROTHROMBIN TIME: 26.7 SEC (ref 9.8–13)
PROTHROMBIN TIME: 30 SEC (ref 9.8–13)
PROTHROMBIN TIME: 30.2 SEC (ref 9.8–13)
PROTHROMBIN TIME: 30.4 SEC (ref 9.8–13)
PROTHROMBIN TIME: 40.2 SEC (ref 9.8–13)
PROTHROMBIN TIME: 46.5 SEC (ref 9.8–13)
PROTHROMBIN TIME: 47.4 SEC (ref 9.8–13)
PROTHROMBIN TIME: 48.4 SEC (ref 9.8–13)
PROTHROMBIN TIME: 53.3 SEC (ref 9.8–13)
RBC # BLD: 3.52 M/UL (ref 4.2–5.9)
RBC # BLD: 3.55 M/UL (ref 4.2–5.9)
RBC # BLD: 3.6 M/UL (ref 4.2–5.9)
RBC # BLD: 3.61 M/UL (ref 4.2–5.9)
RBC # BLD: 3.61 M/UL (ref 4.2–5.9)
RBC # BLD: 3.63 M/UL (ref 4.2–5.9)
RBC # BLD: 3.71 M/UL (ref 4.2–5.9)
RBC # BLD: 3.76 M/UL (ref 4.2–5.9)
RBC # BLD: 3.77 M/UL (ref 4.2–5.9)
RBC # BLD: 3.89 M/UL (ref 4.2–5.9)
RBC # BLD: 3.9 M/UL (ref 4.2–5.9)
RBC # BLD: 3.91 M/UL (ref 4.2–5.9)
RBC # BLD: 3.92 M/UL (ref 4.2–5.9)
RBC # BLD: 3.93 M/UL (ref 4.2–5.9)
RBC # BLD: 3.93 M/UL (ref 4.2–5.9)
RBC # BLD: 3.95 M/UL (ref 4.2–5.9)
RBC # BLD: 3.98 M/UL (ref 4.2–5.9)
RBC # BLD: 4.01 M/UL (ref 4.2–5.9)
RBC # BLD: 4.06 M/UL (ref 4.2–5.9)
RBC # BLD: 4.07 M/UL (ref 4.2–5.9)
RBC # BLD: 4.15 M/UL (ref 4.2–5.9)
RBC # BLD: 4.16 M/UL (ref 4.2–5.9)
RBC # BLD: 4.16 M/UL (ref 4.2–5.9)
RBC # BLD: 4.18 M/UL (ref 4.2–5.9)
RBC # BLD: 4.19 M/UL (ref 4.2–5.9)
RBC # BLD: 4.32 M/UL (ref 4.2–5.9)
RBC # BLD: 4.34 M/UL (ref 4.2–5.9)
RBC # BLD: 4.43 M/UL (ref 4.2–5.9)
RBC UA: ABNORMAL /HPF (ref 0–2)
RENAL EPITHELIAL, UA: ABNORMAL /HPF
RENAL EPITHELIAL, UA: ABNORMAL /HPF
REPORT: NORMAL
SLIDE REVIEW: ABNORMAL
SLIDE REVIEW: ABNORMAL
SODIUM BLD-SCNC: 126 MMOL/L (ref 136–145)
SODIUM BLD-SCNC: 130 MMOL/L (ref 136–145)
SODIUM BLD-SCNC: 131 MMOL/L (ref 136–145)
SODIUM BLD-SCNC: 132 MMOL/L (ref 136–145)
SODIUM BLD-SCNC: 133 MMOL/L (ref 136–145)
SODIUM BLD-SCNC: 133 MMOL/L (ref 136–145)
SODIUM BLD-SCNC: 134 MMOL/L (ref 136–145)
SODIUM BLD-SCNC: 135 MMOL/L (ref 136–145)
SODIUM BLD-SCNC: 136 MMOL/L (ref 136–145)
SODIUM BLD-SCNC: 137 MMOL/L (ref 136–145)
SODIUM BLD-SCNC: 138 MMOL/L (ref 136–145)
SODIUM BLD-SCNC: 139 MMOL/L (ref 136–145)
SODIUM BLD-SCNC: 140 MMOL/L (ref 136–145)
SODIUM BLD-SCNC: 140 MMOL/L (ref 136–145)
SODIUM BLD-SCNC: 141 MMOL/L (ref 136–145)
SPECIFIC GRAVITY UA: 1.01
SPECIFIC GRAVITY UA: 1.02
T4 FREE: 1.3 NG/DL (ref 0.9–1.8)
TCO2 ARTERIAL: 23.6 MMOL/L
TCO2 CALC VENOUS: 29 MMOL/L
TOTAL PROTEIN: 5.7 G/DL (ref 6.4–8.2)
TOTAL PROTEIN: 6 G/DL (ref 6.4–8.2)
TOTAL PROTEIN: 6.3 G/DL (ref 6.4–8.2)
TOTAL PROTEIN: 6.7 G/DL (ref 6.4–8.2)
TOTAL PROTEIN: 6.7 G/DL (ref 6.4–8.2)
TOTAL PROTEIN: 7.6 G/DL (ref 6.4–8.2)
TRIGLYCERIDE, FASTING: 121 MG/DL (ref 0–150)
TROPONIN: 0.14 NG/ML
TROPONIN: 0.15 NG/ML
TROPONIN: 0.2 NG/ML
TROPONIN: 0.28 NG/ML
TSH REFLEX: 10.31 UIU/ML (ref 0.27–4.2)
URINE CULTURE, ROUTINE: ABNORMAL
URINE REFLEX TO CULTURE: YES
URINE REFLEX TO CULTURE: YES
URINE TYPE: ABNORMAL
UROBILINOGEN, URINE: 0.2 E.U./DL
UROBILINOGEN, URINE: 0.2 E.U./DL
UROBILINOGEN, URINE: 1 E.U./DL
UROBILINOGEN, URINE: 1 E.U./DL
UROBILINOGEN, URINE: 2 E.U./DL
VANCOMYCIN RANDOM: 10.1 UG/ML
VANCOMYCIN RANDOM: 11.3 UG/ML
VANCOMYCIN RANDOM: 11.6 UG/ML
VANCOMYCIN RANDOM: 13.2 UG/ML
VANCOMYCIN RANDOM: 15.4 UG/ML
VANCOMYCIN RANDOM: 16.9 UG/ML
VANCOMYCIN RANDOM: 18.4 UG/ML
VANCOMYCIN RANDOM: 7 UG/ML
VANCOMYCIN RANDOM: 9.3 UG/ML
VANCOMYCIN RANDOM: 9.8 UG/ML
VITAMIN D 25-HYDROXY: 41 NG/ML
VLDLC SERPL CALC-MCNC: 24 MG/DL
WBC # BLD: 4.9 K/UL (ref 4–11)
WBC # BLD: 4.9 K/UL (ref 4–11)
WBC # BLD: 5.2 K/UL (ref 4–11)
WBC # BLD: 5.6 K/UL (ref 4–11)
WBC # BLD: 5.8 K/UL (ref 4–11)
WBC # BLD: 5.9 K/UL (ref 4–11)
WBC # BLD: 6 K/UL (ref 4–11)
WBC # BLD: 6.2 K/UL (ref 4–11)
WBC # BLD: 6.4 K/UL (ref 4–11)
WBC # BLD: 6.4 K/UL (ref 4–11)
WBC # BLD: 6.5 K/UL (ref 4–11)
WBC # BLD: 6.7 K/UL (ref 4–11)
WBC # BLD: 6.8 K/UL (ref 4–11)
WBC # BLD: 6.8 K/UL (ref 4–11)
WBC # BLD: 7.1 K/UL (ref 4–11)
WBC # BLD: 7.1 K/UL (ref 4–11)
WBC # BLD: 7.2 K/UL (ref 4–11)
WBC # BLD: 7.2 K/UL (ref 4–11)
WBC # BLD: 7.4 K/UL (ref 4–11)
WBC # BLD: 7.4 K/UL (ref 4–11)
WBC # BLD: 7.5 K/UL (ref 4–11)
WBC # BLD: 7.5 K/UL (ref 4–11)
WBC # BLD: 7.6 K/UL (ref 4–11)
WBC # BLD: 7.6 K/UL (ref 4–11)
WBC # BLD: 7.8 K/UL (ref 4–11)
WBC # BLD: 8.2 K/UL (ref 4–11)
WBC # BLD: 8.2 K/UL (ref 4–11)
WBC # BLD: 8.5 K/UL (ref 4–11)
WBC # BLD: 8.6 K/UL (ref 4–11)
WBC # BLD: 8.6 K/UL (ref 4–11)
WBC UA: >100 /HPF (ref 0–5)
WBC UA: ABNORMAL /HPF (ref 0–5)
WBC UA: ABNORMAL /HPF (ref 0–5)

## 2018-01-01 PROCEDURE — P9047 ALBUMIN (HUMAN), 25%, 50ML: HCPCS | Performed by: INTERNAL MEDICINE

## 2018-01-01 PROCEDURE — 94762 N-INVAS EAR/PLS OXIMTRY CONT: CPT

## 2018-01-01 PROCEDURE — 94640 AIRWAY INHALATION TREATMENT: CPT

## 2018-01-01 PROCEDURE — 85025 COMPLETE CBC W/AUTO DIFF WBC: CPT

## 2018-01-01 PROCEDURE — 6370000000 HC RX 637 (ALT 250 FOR IP): Performed by: INTERNAL MEDICINE

## 2018-01-01 PROCEDURE — 80053 COMPREHEN METABOLIC PANEL: CPT

## 2018-01-01 PROCEDURE — 85610 PROTHROMBIN TIME: CPT

## 2018-01-01 PROCEDURE — 83735 ASSAY OF MAGNESIUM: CPT

## 2018-01-01 PROCEDURE — 84484 ASSAY OF TROPONIN QUANT: CPT

## 2018-01-01 PROCEDURE — 85730 THROMBOPLASTIN TIME PARTIAL: CPT

## 2018-01-01 PROCEDURE — 1101F PT FALLS ASSESS-DOCD LE1/YR: CPT | Performed by: SURGERY

## 2018-01-01 PROCEDURE — 6360000002 HC RX W HCPCS: Performed by: INTERNAL MEDICINE

## 2018-01-01 PROCEDURE — 87077 CULTURE AEROBIC IDENTIFY: CPT

## 2018-01-01 PROCEDURE — 94664 DEMO&/EVAL PT USE INHALER: CPT

## 2018-01-01 PROCEDURE — 97162 PT EVAL MOD COMPLEX 30 MIN: CPT

## 2018-01-01 PROCEDURE — 83605 ASSAY OF LACTIC ACID: CPT

## 2018-01-01 PROCEDURE — 36415 COLL VENOUS BLD VENIPUNCTURE: CPT

## 2018-01-01 PROCEDURE — 80202 ASSAY OF VANCOMYCIN: CPT

## 2018-01-01 PROCEDURE — 2580000003 HC RX 258: Performed by: PHYSICIAN ASSISTANT

## 2018-01-01 PROCEDURE — 94761 N-INVAS EAR/PLS OXIMETRY MLT: CPT

## 2018-01-01 PROCEDURE — 74176 CT ABD & PELVIS W/O CONTRAST: CPT

## 2018-01-01 PROCEDURE — 87040 BLOOD CULTURE FOR BACTERIA: CPT

## 2018-01-01 PROCEDURE — G8981 BODY POS CURRENT STATUS: HCPCS

## 2018-01-01 PROCEDURE — 2580000003 HC RX 258: Performed by: INTERNAL MEDICINE

## 2018-01-01 PROCEDURE — 2700000000 HC OXYGEN THERAPY PER DAY

## 2018-01-01 PROCEDURE — 97163 PT EVAL HIGH COMPLEX 45 MIN: CPT

## 2018-01-01 PROCEDURE — 96365 THER/PROPH/DIAG IV INF INIT: CPT

## 2018-01-01 PROCEDURE — 90937 HEMODIALYSIS REPEATED EVAL: CPT

## 2018-01-01 PROCEDURE — G8988 SELF CARE GOAL STATUS: HCPCS

## 2018-01-01 PROCEDURE — 1200000000 HC SEMI PRIVATE

## 2018-01-01 PROCEDURE — 80048 BASIC METABOLIC PNL TOTAL CA: CPT

## 2018-01-01 PROCEDURE — 6360000002 HC RX W HCPCS: Performed by: NURSE ANESTHETIST, CERTIFIED REGISTERED

## 2018-01-01 PROCEDURE — 92507 TX SP LANG VOICE COMM INDIV: CPT

## 2018-01-01 PROCEDURE — 3600000007 HC SURGERY HYBRID BASE: Performed by: SURGERY

## 2018-01-01 PROCEDURE — 71045 X-RAY EXAM CHEST 1 VIEW: CPT

## 2018-01-01 PROCEDURE — 94150 VITAL CAPACITY TEST: CPT

## 2018-01-01 PROCEDURE — 5A1D70Z PERFORMANCE OF URINARY FILTRATION, INTERMITTENT, LESS THAN 6 HOURS PER DAY: ICD-10-PCS | Performed by: INTERNAL MEDICINE

## 2018-01-01 PROCEDURE — 81001 URINALYSIS AUTO W/SCOPE: CPT

## 2018-01-01 PROCEDURE — 2580000003 HC RX 258

## 2018-01-01 PROCEDURE — 7100000011 HC PHASE II RECOVERY - ADDTL 15 MIN: Performed by: SURGERY

## 2018-01-01 PROCEDURE — 7100000001 HC PACU RECOVERY - ADDTL 15 MIN: Performed by: SURGERY

## 2018-01-01 PROCEDURE — 2580000003 HC RX 258: Performed by: NURSE PRACTITIONER

## 2018-01-01 PROCEDURE — 85027 COMPLETE CBC AUTOMATED: CPT

## 2018-01-01 PROCEDURE — 82803 BLOOD GASES ANY COMBINATION: CPT

## 2018-01-01 PROCEDURE — 6360000002 HC RX W HCPCS: Performed by: SURGERY

## 2018-01-01 PROCEDURE — 96367 TX/PROPH/DG ADDL SEQ IV INF: CPT

## 2018-01-01 PROCEDURE — 93005 ELECTROCARDIOGRAM TRACING: CPT | Performed by: EMERGENCY MEDICINE

## 2018-01-01 PROCEDURE — 92526 ORAL FUNCTION THERAPY: CPT

## 2018-01-01 PROCEDURE — 99284 EMERGENCY DEPT VISIT MOD MDM: CPT

## 2018-01-01 PROCEDURE — G8987 SELF CARE CURRENT STATUS: HCPCS

## 2018-01-01 PROCEDURE — 6360000002 HC RX W HCPCS: Performed by: NURSE PRACTITIONER

## 2018-01-01 PROCEDURE — 1111F DSCHRG MED/CURRENT MED MERGE: CPT | Performed by: INTERNAL MEDICINE

## 2018-01-01 PROCEDURE — G8598 ASA/ANTIPLAT THER USED: HCPCS | Performed by: INTERNAL MEDICINE

## 2018-01-01 PROCEDURE — 87340 HEPATITIS B SURFACE AG IA: CPT

## 2018-01-01 PROCEDURE — 2500000003 HC RX 250 WO HCPCS: Performed by: INTERNAL MEDICINE

## 2018-01-01 PROCEDURE — 3600000014 HC SURGERY LEVEL 4 ADDTL 15MIN: Performed by: SURGERY

## 2018-01-01 PROCEDURE — 2060000000 HC ICU INTERMEDIATE R&B

## 2018-01-01 PROCEDURE — 94760 N-INVAS EAR/PLS OXIMETRY 1: CPT

## 2018-01-01 PROCEDURE — 70450 CT HEAD/BRAIN W/O DYE: CPT

## 2018-01-01 PROCEDURE — G8982 BODY POS GOAL STATUS: HCPCS

## 2018-01-01 PROCEDURE — 2500000003 HC RX 250 WO HCPCS: Performed by: NURSE ANESTHETIST, CERTIFIED REGISTERED

## 2018-01-01 PROCEDURE — 86704 HEP B CORE ANTIBODY TOTAL: CPT

## 2018-01-01 PROCEDURE — 97166 OT EVAL MOD COMPLEX 45 MIN: CPT

## 2018-01-01 PROCEDURE — G8427 DOCREV CUR MEDS BY ELIG CLIN: HCPCS | Performed by: INTERNAL MEDICINE

## 2018-01-01 PROCEDURE — 36819 AV FUSE UPPR ARM BASILIC: CPT | Performed by: SURGERY

## 2018-01-01 PROCEDURE — 74177 CT ABD & PELVIS W/CONTRAST: CPT

## 2018-01-01 PROCEDURE — 83690 ASSAY OF LIPASE: CPT

## 2018-01-01 PROCEDURE — 80069 RENAL FUNCTION PANEL: CPT

## 2018-01-01 PROCEDURE — 7100000000 HC PACU RECOVERY - FIRST 15 MIN: Performed by: SURGERY

## 2018-01-01 PROCEDURE — 6360000002 HC RX W HCPCS

## 2018-01-01 PROCEDURE — 97530 THERAPEUTIC ACTIVITIES: CPT

## 2018-01-01 PROCEDURE — 2580000003 HC RX 258: Performed by: EMERGENCY MEDICINE

## 2018-01-01 PROCEDURE — 71046 X-RAY EXAM CHEST 2 VIEWS: CPT

## 2018-01-01 PROCEDURE — 36832 AV FISTULA REVISION OPEN: CPT | Performed by: SURGERY

## 2018-01-01 PROCEDURE — 1123F ACP DISCUSS/DSCN MKR DOCD: CPT | Performed by: INTERNAL MEDICINE

## 2018-01-01 PROCEDURE — G8979 MOBILITY GOAL STATUS: HCPCS

## 2018-01-01 PROCEDURE — 99285 EMERGENCY DEPT VISIT HI MDM: CPT

## 2018-01-01 PROCEDURE — 3700000001 HC ADD 15 MINUTES (ANESTHESIA): Performed by: SURGERY

## 2018-01-01 PROCEDURE — 2000000000 HC ICU R&B

## 2018-01-01 PROCEDURE — 87086 URINE CULTURE/COLONY COUNT: CPT

## 2018-01-01 PROCEDURE — 86706 HEP B SURFACE ANTIBODY: CPT

## 2018-01-01 PROCEDURE — 99223 1ST HOSP IP/OBS HIGH 75: CPT | Performed by: INTERNAL MEDICINE

## 2018-01-01 PROCEDURE — 96366 THER/PROPH/DIAG IV INF ADDON: CPT

## 2018-01-01 PROCEDURE — 6370000000 HC RX 637 (ALT 250 FOR IP)

## 2018-01-01 PROCEDURE — 99232 SBSQ HOSP IP/OBS MODERATE 35: CPT | Performed by: INTERNAL MEDICINE

## 2018-01-01 PROCEDURE — 05PY33Z REMOVAL OF INFUSION DEVICE FROM UPPER VEIN, PERCUTANEOUS APPROACH: ICD-10-PCS | Performed by: RADIOLOGY

## 2018-01-01 PROCEDURE — 2709999900 HC NON-CHARGEABLE SUPPLY: Performed by: SURGERY

## 2018-01-01 PROCEDURE — G8427 DOCREV CUR MEDS BY ELIG CLIN: HCPCS | Performed by: SURGERY

## 2018-01-01 PROCEDURE — 2580000003 HC RX 258: Performed by: NURSE ANESTHETIST, CERTIFIED REGISTERED

## 2018-01-01 PROCEDURE — 97535 SELF CARE MNGMENT TRAINING: CPT

## 2018-01-01 PROCEDURE — G8996 SWALLOW CURRENT STATUS: HCPCS

## 2018-01-01 PROCEDURE — 87801 DETECT AGNT MULT DNA AMPLI: CPT

## 2018-01-01 PROCEDURE — 7100000010 HC PHASE II RECOVERY - FIRST 15 MIN: Performed by: SURGERY

## 2018-01-01 PROCEDURE — 3600000017 HC SURGERY HYBRID ADDL 15MIN: Performed by: SURGERY

## 2018-01-01 PROCEDURE — 83880 ASSAY OF NATRIURETIC PEPTIDE: CPT

## 2018-01-01 PROCEDURE — G8978 MOBILITY CURRENT STATUS: HCPCS

## 2018-01-01 PROCEDURE — 71250 CT THORAX DX C-: CPT

## 2018-01-01 PROCEDURE — 82947 ASSAY GLUCOSE BLOOD QUANT: CPT

## 2018-01-01 PROCEDURE — 99214 OFFICE O/P EST MOD 30 MIN: CPT | Performed by: SURGERY

## 2018-01-01 PROCEDURE — 1036F TOBACCO NON-USER: CPT | Performed by: INTERNAL MEDICINE

## 2018-01-01 PROCEDURE — 36589 REMOVAL TUNNELED CV CATH: CPT

## 2018-01-01 PROCEDURE — 93010 ELECTROCARDIOGRAM REPORT: CPT | Performed by: INTERNAL MEDICINE

## 2018-01-01 PROCEDURE — C1894 INTRO/SHEATH, NON-LASER: HCPCS | Performed by: SURGERY

## 2018-01-01 PROCEDURE — 87186 SC STD MICRODIL/AGAR DIL: CPT

## 2018-01-01 PROCEDURE — 99024 POSTOP FOLLOW-UP VISIT: CPT | Performed by: SURGERY

## 2018-01-01 PROCEDURE — G8417 CALC BMI ABV UP PARAM F/U: HCPCS | Performed by: SURGERY

## 2018-01-01 PROCEDURE — 6360000002 HC RX W HCPCS: Performed by: EMERGENCY MEDICINE

## 2018-01-01 PROCEDURE — 6360000002 HC RX W HCPCS: Performed by: PHYSICIAN ASSISTANT

## 2018-01-01 PROCEDURE — G8926 SPIRO NO PERF OR DOC: HCPCS | Performed by: INTERNAL MEDICINE

## 2018-01-01 PROCEDURE — 87184 SC STD DISK METHOD PER PLATE: CPT

## 2018-01-01 PROCEDURE — 97110 THERAPEUTIC EXERCISES: CPT

## 2018-01-01 PROCEDURE — 99214 OFFICE O/P EST MOD 30 MIN: CPT | Performed by: INTERNAL MEDICINE

## 2018-01-01 PROCEDURE — 73620 X-RAY EXAM OF FOOT: CPT

## 2018-01-01 PROCEDURE — 3600000004 HC SURGERY LEVEL 4 BASE: Performed by: SURGERY

## 2018-01-01 PROCEDURE — 6360000004 HC RX CONTRAST MEDICATION

## 2018-01-01 PROCEDURE — G8420 CALC BMI NORM PARAMETERS: HCPCS | Performed by: INTERNAL MEDICINE

## 2018-01-01 PROCEDURE — 3023F SPIROM DOC REV: CPT | Performed by: INTERNAL MEDICINE

## 2018-01-01 PROCEDURE — 4040F PNEUMOC VAC/ADMIN/RCVD: CPT | Performed by: INTERNAL MEDICINE

## 2018-01-01 PROCEDURE — 84145 PROCALCITONIN (PCT): CPT

## 2018-01-01 PROCEDURE — 92610 EVALUATE SWALLOWING FUNCTION: CPT

## 2018-01-01 PROCEDURE — 84132 ASSAY OF SERUM POTASSIUM: CPT

## 2018-01-01 PROCEDURE — 2580000003 HC RX 258: Performed by: SURGERY

## 2018-01-01 PROCEDURE — 93005 ELECTROCARDIOGRAM TRACING: CPT | Performed by: NURSE PRACTITIONER

## 2018-01-01 PROCEDURE — 49325 LAP REVISION PERM IP CATH: CPT | Performed by: SURGERY

## 2018-01-01 PROCEDURE — 3700000000 HC ANESTHESIA ATTENDED CARE: Performed by: SURGERY

## 2018-01-01 PROCEDURE — 96375 TX/PRO/DX INJ NEW DRUG ADDON: CPT

## 2018-01-01 PROCEDURE — 77001 FLUOROGUIDE FOR VEIN DEVICE: CPT

## 2018-01-01 PROCEDURE — 93005 ELECTROCARDIOGRAM TRACING: CPT | Performed by: PHYSICIAN ASSISTANT

## 2018-01-01 PROCEDURE — 36600 WITHDRAWAL OF ARTERIAL BLOOD: CPT

## 2018-01-01 PROCEDURE — G8997 SWALLOW GOAL STATUS: HCPCS

## 2018-01-01 RX ORDER — QUETIAPINE FUMARATE 25 MG/1
12.5 TABLET, FILM COATED ORAL NIGHTLY
Qty: 60 TABLET | Refills: 3 | Status: ON HOLD | OUTPATIENT
Start: 2018-01-01 | End: 2018-01-01 | Stop reason: HOSPADM

## 2018-01-01 RX ORDER — FENTANYL CITRATE 50 UG/ML
INJECTION, SOLUTION INTRAMUSCULAR; INTRAVENOUS PRN
Status: DISCONTINUED | OUTPATIENT
Start: 2018-01-01 | End: 2018-01-01 | Stop reason: SDUPTHER

## 2018-01-01 RX ORDER — LUBIPROSTONE 8 UG/1
24 CAPSULE, GELATIN COATED ORAL DAILY
Status: DISPENSED | OUTPATIENT
Start: 2018-01-01 | End: 2018-01-01

## 2018-01-01 RX ORDER — BISACODYL 10 MG
SUPPOSITORY, RECTAL RECTAL
Status: COMPLETED
Start: 2018-01-01 | End: 2018-01-01

## 2018-01-01 RX ORDER — QUETIAPINE FUMARATE 25 MG/1
25 TABLET, FILM COATED ORAL NIGHTLY
Status: DISCONTINUED | OUTPATIENT
Start: 2018-01-01 | End: 2018-01-01 | Stop reason: HOSPADM

## 2018-01-01 RX ORDER — MEPERIDINE HYDROCHLORIDE 50 MG/ML
12.5 INJECTION INTRAMUSCULAR; INTRAVENOUS; SUBCUTANEOUS EVERY 5 MIN PRN
Status: DISCONTINUED | OUTPATIENT
Start: 2018-01-01 | End: 2018-01-01 | Stop reason: HOSPADM

## 2018-01-01 RX ORDER — EPHEDRINE SULFATE 50 MG/ML
INJECTION INTRAVENOUS PRN
Status: DISCONTINUED | OUTPATIENT
Start: 2018-01-01 | End: 2018-01-01 | Stop reason: SDUPTHER

## 2018-01-01 RX ORDER — LEVOTHYROXINE SODIUM 25 UG/1
25 CAPSULE ORAL DAILY
Qty: 90 CAPSULE | Refills: 3 | Status: SHIPPED | OUTPATIENT
Start: 2018-01-01 | End: 2018-01-01 | Stop reason: CLARIF

## 2018-01-01 RX ORDER — SENNA PLUS 8.6 MG/1
2 TABLET ORAL 2 TIMES DAILY
Status: DISCONTINUED | OUTPATIENT
Start: 2018-01-01 | End: 2018-01-01 | Stop reason: HOSPADM

## 2018-01-01 RX ORDER — MORPHINE SULFATE 2 MG/ML
1 INJECTION, SOLUTION INTRAMUSCULAR; INTRAVENOUS
Status: DISCONTINUED | OUTPATIENT
Start: 2018-01-01 | End: 2018-01-01 | Stop reason: HOSPADM

## 2018-01-01 RX ORDER — ONDANSETRON 2 MG/ML
INJECTION INTRAMUSCULAR; INTRAVENOUS PRN
Status: DISCONTINUED | OUTPATIENT
Start: 2018-01-01 | End: 2018-01-01 | Stop reason: SDUPTHER

## 2018-01-01 RX ORDER — PANTOPRAZOLE SODIUM 20 MG/1
20 TABLET, DELAYED RELEASE ORAL DAILY
Status: ON HOLD | COMMUNITY
End: 2018-01-01 | Stop reason: ALTCHOICE

## 2018-01-01 RX ORDER — HYDRALAZINE HYDROCHLORIDE 20 MG/ML
5 INJECTION INTRAMUSCULAR; INTRAVENOUS EVERY 30 MIN PRN
Status: DISCONTINUED | OUTPATIENT
Start: 2018-01-01 | End: 2018-01-01 | Stop reason: HOSPADM

## 2018-01-01 RX ORDER — ALBUTEROL SULFATE 2.5 MG/3ML
2.5 SOLUTION RESPIRATORY (INHALATION) EVERY 4 HOURS PRN
Status: DISCONTINUED | OUTPATIENT
Start: 2018-01-01 | End: 2018-01-01 | Stop reason: HOSPADM

## 2018-01-01 RX ORDER — LORAZEPAM 2 MG/ML
0.5 INJECTION INTRAMUSCULAR EVERY 6 HOURS PRN
Status: DISCONTINUED | OUTPATIENT
Start: 2018-01-01 | End: 2018-01-01

## 2018-01-01 RX ORDER — DEXTROSE MONOHYDRATE 50 MG/ML
100 INJECTION, SOLUTION INTRAVENOUS PRN
Status: DISCONTINUED | OUTPATIENT
Start: 2018-01-01 | End: 2018-01-01 | Stop reason: HOSPADM

## 2018-01-01 RX ORDER — HEPARIN SODIUM 1000 [USP'U]/ML
3500 INJECTION, SOLUTION INTRAVENOUS; SUBCUTANEOUS PRN
Status: DISCONTINUED | OUTPATIENT
Start: 2018-01-01 | End: 2018-01-01 | Stop reason: HOSPADM

## 2018-01-01 RX ORDER — SENNA PLUS 8.6 MG/1
2 TABLET ORAL 2 TIMES DAILY
Status: DISCONTINUED | OUTPATIENT
Start: 2018-01-01 | End: 2018-01-01

## 2018-01-01 RX ORDER — MIDODRINE HYDROCHLORIDE 5 MG/1
5 TABLET ORAL PRN
COMMUNITY
End: 2018-01-01

## 2018-01-01 RX ORDER — DOCUSATE SODIUM 100 MG/1
100 CAPSULE, LIQUID FILLED ORAL DAILY
Status: DISCONTINUED | OUTPATIENT
Start: 2018-01-01 | End: 2018-01-01 | Stop reason: HOSPADM

## 2018-01-01 RX ORDER — PROPOFOL 10 MG/ML
INJECTION, EMULSION INTRAVENOUS PRN
Status: DISCONTINUED | OUTPATIENT
Start: 2018-01-01 | End: 2018-01-01 | Stop reason: SDUPTHER

## 2018-01-01 RX ORDER — PHYTONADIONE 5 MG/1
5 TABLET ORAL DAILY
Status: DISCONTINUED | OUTPATIENT
Start: 2018-01-01 | End: 2018-01-01

## 2018-01-01 RX ORDER — LUBIPROSTONE 24 UG/1
24 CAPSULE, GELATIN COATED ORAL DAILY
Qty: 30 CAPSULE | Refills: 3 | Status: SHIPPED | OUTPATIENT
Start: 2018-01-01

## 2018-01-01 RX ORDER — LEVALBUTEROL TARTRATE 45 UG/1
2 AEROSOL, METERED ORAL EVERY 6 HOURS PRN
Status: DISCONTINUED | OUTPATIENT
Start: 2018-01-01 | End: 2018-01-01

## 2018-01-01 RX ORDER — WARFARIN SODIUM 2 MG/1
2 TABLET ORAL
Status: COMPLETED | OUTPATIENT
Start: 2018-01-01 | End: 2018-01-01

## 2018-01-01 RX ORDER — LEVALBUTEROL TARTRATE 45 UG/1
2 AEROSOL, METERED ORAL EVERY 4 HOURS PRN
Status: DISCONTINUED | OUTPATIENT
Start: 2018-01-01 | End: 2018-01-01

## 2018-01-01 RX ORDER — SODIUM CHLORIDE 0.9 % (FLUSH) 0.9 %
10 SYRINGE (ML) INJECTION EVERY 12 HOURS SCHEDULED
Status: DISCONTINUED | OUTPATIENT
Start: 2018-01-01 | End: 2018-01-01 | Stop reason: HOSPADM

## 2018-01-01 RX ORDER — NICOTINE POLACRILEX 4 MG
15 LOZENGE BUCCAL PRN
Status: DISCONTINUED | OUTPATIENT
Start: 2018-01-01 | End: 2018-01-01 | Stop reason: HOSPADM

## 2018-01-01 RX ORDER — PROMETHAZINE HYDROCHLORIDE 25 MG/ML
6.25 INJECTION, SOLUTION INTRAMUSCULAR; INTRAVENOUS
Status: DISCONTINUED | OUTPATIENT
Start: 2018-01-01 | End: 2018-01-01 | Stop reason: HOSPADM

## 2018-01-01 RX ORDER — ACETAMINOPHEN 650 MG
TABLET, EXTENDED RELEASE ORAL PRN
Status: DISCONTINUED | OUTPATIENT
Start: 2018-01-01 | End: 2018-01-01 | Stop reason: HOSPADM

## 2018-01-01 RX ORDER — 0.9 % SODIUM CHLORIDE 0.9 %
500 INTRAVENOUS SOLUTION INTRAVENOUS ONCE
Status: COMPLETED | OUTPATIENT
Start: 2018-01-01 | End: 2018-01-01

## 2018-01-01 RX ORDER — DEXTROSE MONOHYDRATE 25 G/50ML
12.5 INJECTION, SOLUTION INTRAVENOUS PRN
Status: DISCONTINUED | OUTPATIENT
Start: 2018-01-01 | End: 2018-01-01 | Stop reason: HOSPADM

## 2018-01-01 RX ORDER — ONDANSETRON 2 MG/ML
4 INJECTION INTRAMUSCULAR; INTRAVENOUS EVERY 6 HOURS PRN
Status: DISCONTINUED | OUTPATIENT
Start: 2018-01-01 | End: 2018-01-01

## 2018-01-01 RX ORDER — OXYCODONE HYDROCHLORIDE AND ACETAMINOPHEN 5; 325 MG/1; MG/1
1 TABLET ORAL PRN
Status: DISCONTINUED | OUTPATIENT
Start: 2018-01-01 | End: 2018-01-01 | Stop reason: HOSPADM

## 2018-01-01 RX ORDER — MIDODRINE HYDROCHLORIDE 5 MG/1
10 TABLET ORAL ONCE
Status: COMPLETED | OUTPATIENT
Start: 2018-01-01 | End: 2018-01-01

## 2018-01-01 RX ORDER — PHYTONADIONE 10 MG/ML
10 INJECTION, EMULSION INTRAMUSCULAR; INTRAVENOUS; SUBCUTANEOUS ONCE
Status: COMPLETED | OUTPATIENT
Start: 2018-01-01 | End: 2018-01-01

## 2018-01-01 RX ORDER — MAGNESIUM HYDROXIDE 1200 MG/15ML
1000 LIQUID ORAL CONTINUOUS
Status: DISCONTINUED | OUTPATIENT
Start: 2018-01-01 | End: 2018-01-01 | Stop reason: HOSPADM

## 2018-01-01 RX ORDER — WARFARIN SODIUM 2.5 MG/1
2.5 TABLET ORAL
Status: COMPLETED | OUTPATIENT
Start: 2018-01-01 | End: 2018-01-01

## 2018-01-01 RX ORDER — HYDRALAZINE HYDROCHLORIDE 20 MG/ML
5 INJECTION INTRAMUSCULAR; INTRAVENOUS EVERY 10 MIN PRN
Status: DISCONTINUED | OUTPATIENT
Start: 2018-01-01 | End: 2018-01-01 | Stop reason: HOSPADM

## 2018-01-01 RX ORDER — CHOLECALCIFEROL (VITAMIN D3) 10 MCG
1 TABLET ORAL DAILY
Status: DISCONTINUED | OUTPATIENT
Start: 2018-01-01 | End: 2018-01-01 | Stop reason: HOSPADM

## 2018-01-01 RX ORDER — SODIUM CHLORIDE 0.9 % (FLUSH) 0.9 %
10 SYRINGE (ML) INJECTION PRN
Status: DISCONTINUED | OUTPATIENT
Start: 2018-01-01 | End: 2018-01-01 | Stop reason: HOSPADM

## 2018-01-01 RX ORDER — PHYTONADIONE 5 MG/1
10 TABLET ORAL ONCE
Status: COMPLETED | OUTPATIENT
Start: 2018-01-01 | End: 2018-01-01

## 2018-01-01 RX ORDER — HYDROCODONE BITARTRATE AND ACETAMINOPHEN 5; 325 MG/1; MG/1
1 TABLET ORAL EVERY 6 HOURS PRN
Qty: 28 TABLET | Refills: 0 | Status: SHIPPED | OUTPATIENT
Start: 2018-01-01 | End: 2018-01-01

## 2018-01-01 RX ORDER — MIDODRINE HYDROCHLORIDE 5 MG/1
10 TABLET ORAL PRN
Status: DISCONTINUED | OUTPATIENT
Start: 2018-01-01 | End: 2018-01-01 | Stop reason: HOSPADM

## 2018-01-01 RX ORDER — AMIODARONE HYDROCHLORIDE 200 MG/1
100 TABLET ORAL DAILY
Status: DISCONTINUED | OUTPATIENT
Start: 2018-01-01 | End: 2018-01-01 | Stop reason: HOSPADM

## 2018-01-01 RX ORDER — POTASSIUM CHLORIDE 750 MG/1
10 CAPSULE, EXTENDED RELEASE ORAL DAILY
Status: ON HOLD | COMMUNITY
End: 2018-01-01 | Stop reason: HOSPADM

## 2018-01-01 RX ORDER — SODIUM CHLORIDE, SODIUM LACTATE, POTASSIUM CHLORIDE, CALCIUM CHLORIDE 600; 310; 30; 20 MG/100ML; MG/100ML; MG/100ML; MG/100ML
INJECTION, SOLUTION INTRAVENOUS CONTINUOUS
Status: DISCONTINUED | OUTPATIENT
Start: 2018-01-01 | End: 2018-01-01 | Stop reason: HOSPADM

## 2018-01-01 RX ORDER — LEVALBUTEROL TARTRATE 45 UG/1
2 AEROSOL, METERED ORAL 4 TIMES DAILY
Status: DISCONTINUED | OUTPATIENT
Start: 2018-01-01 | End: 2018-01-01

## 2018-01-01 RX ORDER — CARVEDILOL 3.12 MG/1
3.12 TABLET ORAL 2 TIMES DAILY WITH MEALS
Status: DISCONTINUED | OUTPATIENT
Start: 2018-01-01 | End: 2018-01-01

## 2018-01-01 RX ORDER — ACETAMINOPHEN 650 MG
TABLET, EXTENDED RELEASE ORAL
Qty: 1 BOTTLE | Refills: 0 | Status: SHIPPED | OUTPATIENT
Start: 2018-01-01 | End: 2018-01-01

## 2018-01-01 RX ORDER — HYDROCODONE BITARTRATE AND ACETAMINOPHEN 5; 325 MG/1; MG/1
1 TABLET ORAL EVERY 8 HOURS PRN
Qty: 15 TABLET | Refills: 0 | Status: SHIPPED | OUTPATIENT
Start: 2018-01-01 | End: 2018-01-01

## 2018-01-01 RX ORDER — ONDANSETRON 2 MG/ML
4 INJECTION INTRAMUSCULAR; INTRAVENOUS EVERY 6 HOURS PRN
Status: DISCONTINUED | OUTPATIENT
Start: 2018-01-01 | End: 2018-01-01 | Stop reason: RX

## 2018-01-01 RX ORDER — SODIUM CHLORIDE 9 MG/ML
INJECTION, SOLUTION INTRAVENOUS
Status: COMPLETED
Start: 2018-01-01 | End: 2018-01-01

## 2018-01-01 RX ORDER — LABETALOL HYDROCHLORIDE 5 MG/ML
5 INJECTION, SOLUTION INTRAVENOUS EVERY 10 MIN PRN
Status: DISCONTINUED | OUTPATIENT
Start: 2018-01-01 | End: 2018-01-01 | Stop reason: HOSPADM

## 2018-01-01 RX ORDER — MORPHINE SULFATE 4 MG/ML
2 INJECTION, SOLUTION INTRAMUSCULAR; INTRAVENOUS EVERY 5 MIN PRN
Status: DISCONTINUED | OUTPATIENT
Start: 2018-01-01 | End: 2018-01-01 | Stop reason: HOSPADM

## 2018-01-01 RX ORDER — DIPHENHYDRAMINE HYDROCHLORIDE 50 MG/ML
12.5 INJECTION INTRAMUSCULAR; INTRAVENOUS
Status: DISCONTINUED | OUTPATIENT
Start: 2018-01-01 | End: 2018-01-01 | Stop reason: HOSPADM

## 2018-01-01 RX ORDER — POTASSIUM CHLORIDE 750 MG/1
TABLET, EXTENDED RELEASE ORAL
Qty: 90 TABLET | Refills: 0 | Status: ON HOLD | OUTPATIENT
Start: 2018-01-01 | End: 2018-01-01 | Stop reason: ALTCHOICE

## 2018-01-01 RX ORDER — ONDANSETRON 4 MG/1
4 TABLET, ORALLY DISINTEGRATING ORAL ONCE
Status: COMPLETED | OUTPATIENT
Start: 2018-01-01 | End: 2018-01-01

## 2018-01-01 RX ORDER — 0.9 % SODIUM CHLORIDE 0.9 %
250 INTRAVENOUS SOLUTION INTRAVENOUS ONCE
Status: COMPLETED | OUTPATIENT
Start: 2018-01-01 | End: 2018-01-01

## 2018-01-01 RX ORDER — LEVOFLOXACIN 5 MG/ML
750 INJECTION, SOLUTION INTRAVENOUS ONCE
Status: COMPLETED | OUTPATIENT
Start: 2018-01-01 | End: 2018-01-01

## 2018-01-01 RX ORDER — MORPHINE SULFATE 4 MG/ML
1 INJECTION, SOLUTION INTRAMUSCULAR; INTRAVENOUS EVERY 5 MIN PRN
Status: DISCONTINUED | OUTPATIENT
Start: 2018-01-01 | End: 2018-01-01 | Stop reason: HOSPADM

## 2018-01-01 RX ORDER — IPRATROPIUM BROMIDE AND ALBUTEROL SULFATE 2.5; .5 MG/3ML; MG/3ML
1 SOLUTION RESPIRATORY (INHALATION) ONCE
Status: COMPLETED | OUTPATIENT
Start: 2018-01-01 | End: 2018-01-01

## 2018-01-01 RX ORDER — ACETAMINOPHEN 650 MG
TABLET, EXTENDED RELEASE ORAL
Status: COMPLETED
Start: 2018-01-01 | End: 2018-01-01

## 2018-01-01 RX ORDER — ISOSORBIDE DINITRATE 10 MG/1
10 TABLET ORAL 2 TIMES DAILY
Status: DISCONTINUED | OUTPATIENT
Start: 2018-01-01 | End: 2018-01-01 | Stop reason: HOSPADM

## 2018-01-01 RX ORDER — QUETIAPINE FUMARATE 25 MG/1
12.5 TABLET, FILM COATED ORAL NIGHTLY
Status: DISCONTINUED | OUTPATIENT
Start: 2018-01-01 | End: 2018-01-01 | Stop reason: HOSPADM

## 2018-01-01 RX ORDER — LEVOTHYROXINE SODIUM 0.03 MG/1
25 TABLET ORAL DAILY
Status: DISCONTINUED | OUTPATIENT
Start: 2018-01-01 | End: 2018-01-01 | Stop reason: HOSPADM

## 2018-01-01 RX ORDER — OXYCODONE HYDROCHLORIDE AND ACETAMINOPHEN 5; 325 MG/1; MG/1
1 TABLET ORAL PRN
Status: COMPLETED | OUTPATIENT
Start: 2018-01-01 | End: 2018-01-01

## 2018-01-01 RX ORDER — LIDOCAINE HYDROCHLORIDE 20 MG/ML
INJECTION, SOLUTION INFILTRATION; PERINEURAL PRN
Status: DISCONTINUED | OUTPATIENT
Start: 2018-01-01 | End: 2018-01-01 | Stop reason: SDUPTHER

## 2018-01-01 RX ORDER — GLUCAGON 1 MG/ML
1 KIT INJECTION PRN
Status: DISCONTINUED | OUTPATIENT
Start: 2018-01-01 | End: 2018-01-01 | Stop reason: HOSPADM

## 2018-01-01 RX ORDER — HYDROCODONE BITARTRATE AND ACETAMINOPHEN 5; 325 MG/1; MG/1
1 TABLET ORAL EVERY 8 HOURS PRN
Qty: 30 TABLET | Refills: 0 | Status: SHIPPED | OUTPATIENT
Start: 2018-01-01 | End: 2018-01-01

## 2018-01-01 RX ORDER — MIDODRINE HYDROCHLORIDE 5 MG/1
10 TABLET ORAL
Status: DISCONTINUED | OUTPATIENT
Start: 2018-01-01 | End: 2018-01-01 | Stop reason: HOSPADM

## 2018-01-01 RX ORDER — LUBIPROSTONE 8 UG/1
24 CAPSULE, GELATIN COATED ORAL DAILY
Status: DISCONTINUED | OUTPATIENT
Start: 2018-01-01 | End: 2018-01-01 | Stop reason: HOSPADM

## 2018-01-01 RX ORDER — ALBUTEROL SULFATE 2.5 MG/3ML
0.63 SOLUTION RESPIRATORY (INHALATION) 3 TIMES DAILY
Status: DISCONTINUED | OUTPATIENT
Start: 2018-01-01 | End: 2018-01-01

## 2018-01-01 RX ORDER — TORSEMIDE 20 MG/1
TABLET ORAL
Qty: 180 TABLET | Refills: 0 | Status: ON HOLD | OUTPATIENT
Start: 2018-01-01 | End: 2018-01-01 | Stop reason: HOSPADM

## 2018-01-01 RX ORDER — SODIUM CHLORIDE 9 MG/ML
INJECTION, SOLUTION INTRAVENOUS CONTINUOUS PRN
Status: DISCONTINUED | OUTPATIENT
Start: 2018-01-01 | End: 2018-01-01 | Stop reason: SDUPTHER

## 2018-01-01 RX ORDER — OXYCODONE HYDROCHLORIDE AND ACETAMINOPHEN 5; 325 MG/1; MG/1
2 TABLET ORAL PRN
Status: DISCONTINUED | OUTPATIENT
Start: 2018-01-01 | End: 2018-01-01 | Stop reason: HOSPADM

## 2018-01-01 RX ORDER — ONDANSETRON 2 MG/ML
4 INJECTION INTRAMUSCULAR; INTRAVENOUS EVERY 6 HOURS PRN
Status: DISCONTINUED | OUTPATIENT
Start: 2018-01-01 | End: 2018-01-01 | Stop reason: HOSPADM

## 2018-01-01 RX ORDER — ALBUTEROL SULFATE 2.5 MG/3ML
2.5 SOLUTION RESPIRATORY (INHALATION) 4 TIMES DAILY
Status: DISCONTINUED | OUTPATIENT
Start: 2018-01-01 | End: 2018-01-01 | Stop reason: HOSPADM

## 2018-01-01 RX ORDER — DORZOLAMIDE HCL 20 MG/ML
1 SOLUTION/ DROPS OPHTHALMIC 2 TIMES DAILY
Status: DISCONTINUED | OUTPATIENT
Start: 2018-01-01 | End: 2018-01-01 | Stop reason: HOSPADM

## 2018-01-01 RX ORDER — ONDANSETRON 2 MG/ML
4 INJECTION INTRAMUSCULAR; INTRAVENOUS
Status: DISCONTINUED | OUTPATIENT
Start: 2018-01-01 | End: 2018-01-01 | Stop reason: HOSPADM

## 2018-01-01 RX ORDER — LUBIPROSTONE 24 UG/1
24 CAPSULE, GELATIN COATED ORAL DAILY
Status: DISCONTINUED | OUTPATIENT
Start: 2018-01-01 | End: 2018-01-01

## 2018-01-01 RX ORDER — ACETAMINOPHEN 650 MG
TABLET, EXTENDED RELEASE ORAL
Status: DISPENSED
Start: 2018-01-01 | End: 2018-01-01

## 2018-01-01 RX ORDER — BISACODYL 10 MG
10 SUPPOSITORY, RECTAL RECTAL ONCE
Status: COMPLETED | OUTPATIENT
Start: 2018-01-01 | End: 2018-01-01

## 2018-01-01 RX ORDER — WARFARIN SODIUM 2 MG/1
2 TABLET ORAL DAILY
Status: DISCONTINUED | OUTPATIENT
Start: 2018-01-01 | End: 2018-01-01

## 2018-01-01 RX ORDER — LEVALBUTEROL TARTRATE 45 UG/1
2 AEROSOL, METERED ORAL EVERY 4 HOURS PRN
Status: DISCONTINUED | OUTPATIENT
Start: 2018-01-01 | End: 2018-01-01 | Stop reason: HOSPADM

## 2018-01-01 RX ORDER — LIDOCAINE HYDROCHLORIDE 10 MG/ML
0.3 INJECTION, SOLUTION EPIDURAL; INFILTRATION; INTRACAUDAL; PERINEURAL
Status: DISCONTINUED | OUTPATIENT
Start: 2018-01-01 | End: 2018-01-01 | Stop reason: HOSPADM

## 2018-01-01 RX ORDER — HEPARIN SODIUM 1000 [USP'U]/ML
3500 INJECTION, SOLUTION INTRAVENOUS; SUBCUTANEOUS ONCE
Status: COMPLETED | OUTPATIENT
Start: 2018-01-01 | End: 2018-01-01

## 2018-01-01 RX ORDER — ALBUTEROL SULFATE 2.5 MG/3ML
2.5 SOLUTION RESPIRATORY (INHALATION) EVERY 6 HOURS PRN
Status: DISCONTINUED | OUTPATIENT
Start: 2018-01-01 | End: 2018-01-01

## 2018-01-01 RX ORDER — DEXTROSE AND SODIUM CHLORIDE 5; .9 G/100ML; G/100ML
500 INJECTION, SOLUTION INTRAVENOUS ONCE
Status: COMPLETED | OUTPATIENT
Start: 2018-01-01 | End: 2018-01-01

## 2018-01-01 RX ORDER — ALBUTEROL SULFATE 2.5 MG/3ML
2.5 SOLUTION RESPIRATORY (INHALATION)
Status: DISCONTINUED | OUTPATIENT
Start: 2018-01-01 | End: 2018-01-01

## 2018-01-01 RX ORDER — DIPHENHYDRAMINE HYDROCHLORIDE 50 MG/ML
6.25 INJECTION INTRAMUSCULAR; INTRAVENOUS
Status: ACTIVE | OUTPATIENT
Start: 2018-01-01 | End: 2018-01-01

## 2018-01-01 RX ORDER — QUETIAPINE FUMARATE 25 MG/1
25 TABLET, FILM COATED ORAL NIGHTLY
Status: DISCONTINUED | OUTPATIENT
Start: 2018-01-01 | End: 2018-01-01

## 2018-01-01 RX ORDER — ACETAMINOPHEN 650 MG
TABLET, EXTENDED RELEASE ORAL 2 TIMES DAILY
Status: DISCONTINUED | OUTPATIENT
Start: 2018-01-01 | End: 2018-01-01 | Stop reason: HOSPADM

## 2018-01-01 RX ORDER — ALBUTEROL SULFATE 2.5 MG/3ML
0.63 SOLUTION RESPIRATORY (INHALATION) 4 TIMES DAILY
Status: DISCONTINUED | OUTPATIENT
Start: 2018-01-01 | End: 2018-01-01 | Stop reason: HOSPADM

## 2018-01-01 RX ORDER — ALBUMIN (HUMAN) 12.5 G/50ML
25 SOLUTION INTRAVENOUS PRN
Status: DISCONTINUED | OUTPATIENT
Start: 2018-01-01 | End: 2018-01-01 | Stop reason: HOSPADM

## 2018-01-01 RX ORDER — TORSEMIDE 20 MG/1
TABLET ORAL
Qty: 60 TABLET | Refills: 0 | Status: ON HOLD | OUTPATIENT
Start: 2018-01-01 | End: 2018-01-01 | Stop reason: ALTCHOICE

## 2018-01-01 RX ORDER — INSULIN GLARGINE 100 [IU]/ML
12 INJECTION, SOLUTION SUBCUTANEOUS NIGHTLY
Status: DISCONTINUED | OUTPATIENT
Start: 2018-01-01 | End: 2018-01-01 | Stop reason: HOSPADM

## 2018-01-01 RX ORDER — LEVALBUTEROL TARTRATE 45 UG/1
2 AEROSOL, METERED ORAL 2 TIMES DAILY
Status: DISCONTINUED | OUTPATIENT
Start: 2018-01-01 | End: 2018-01-01

## 2018-01-01 RX ORDER — LATANOPROST 50 UG/ML
1 SOLUTION/ DROPS OPHTHALMIC NIGHTLY
Status: DISCONTINUED | OUTPATIENT
Start: 2018-01-01 | End: 2018-01-01 | Stop reason: HOSPADM

## 2018-01-01 RX ORDER — WARFARIN SODIUM 2 MG/1
2 TABLET ORAL
Status: DISCONTINUED | OUTPATIENT
Start: 2018-01-01 | End: 2018-01-01 | Stop reason: HOSPADM

## 2018-01-01 RX ORDER — OXYCODONE HYDROCHLORIDE AND ACETAMINOPHEN 5; 325 MG/1; MG/1
2 TABLET ORAL PRN
Status: COMPLETED | OUTPATIENT
Start: 2018-01-01 | End: 2018-01-01

## 2018-01-01 RX ORDER — DOCUSATE SODIUM 100 MG/1
100 CAPSULE, LIQUID FILLED ORAL DAILY
Status: DISCONTINUED | OUTPATIENT
Start: 2018-01-01 | End: 2018-01-01

## 2018-01-01 RX ORDER — POLYETHYLENE GLYCOL 3350 17 G/17G
17 POWDER, FOR SOLUTION ORAL DAILY PRN
Qty: 527 G | Refills: 1 | Status: SHIPPED | OUTPATIENT
Start: 2018-01-01 | End: 2018-01-01

## 2018-01-01 RX ORDER — TORSEMIDE 20 MG/1
20 TABLET ORAL DAILY
Status: DISCONTINUED | OUTPATIENT
Start: 2018-01-01 | End: 2018-01-01 | Stop reason: HOSPADM

## 2018-01-01 RX ORDER — MORPHINE SULFATE 2 MG/ML
2 INJECTION, SOLUTION INTRAMUSCULAR; INTRAVENOUS ONCE
Status: COMPLETED | OUTPATIENT
Start: 2018-01-01 | End: 2018-01-01

## 2018-01-01 RX ORDER — ACETAMINOPHEN 650 MG
TABLET, EXTENDED RELEASE ORAL PRN
COMMUNITY

## 2018-01-01 RX ORDER — ALBUTEROL SULFATE 2.5 MG/3ML
0.63 SOLUTION RESPIRATORY (INHALATION) 4 TIMES DAILY
Status: DISCONTINUED | OUTPATIENT
Start: 2018-01-01 | End: 2018-01-01

## 2018-01-01 RX ORDER — HYDROCODONE BITARTRATE AND ACETAMINOPHEN 5; 325 MG/1; MG/1
1 TABLET ORAL EVERY 8 HOURS PRN
Status: DISCONTINUED | OUTPATIENT
Start: 2018-01-01 | End: 2018-01-01 | Stop reason: HOSPADM

## 2018-01-01 RX ORDER — HEPARIN SODIUM 5000 [USP'U]/ML
5000 INJECTION, SOLUTION INTRAVENOUS; SUBCUTANEOUS EVERY 8 HOURS SCHEDULED
Status: DISCONTINUED | OUTPATIENT
Start: 2018-01-01 | End: 2018-01-01 | Stop reason: HOSPADM

## 2018-01-01 RX ORDER — HYDROCODONE BITARTRATE AND ACETAMINOPHEN 5; 325 MG/1; MG/1
1 TABLET ORAL EVERY 6 HOURS PRN
Qty: 10 TABLET | Refills: 0 | Status: SHIPPED | OUTPATIENT
Start: 2018-01-01 | End: 2018-01-01

## 2018-01-01 RX ORDER — GENTAMICIN SULFATE 1 MG/G
OINTMENT TOPICAL
Qty: 1 TUBE | Refills: 1 | Status: SHIPPED | OUTPATIENT
Start: 2018-01-01 | End: 2018-01-01

## 2018-01-01 RX ORDER — CEPHALEXIN 500 MG/1
500 CAPSULE ORAL 2 TIMES DAILY
Qty: 14 CAPSULE | Refills: 0 | Status: SHIPPED | OUTPATIENT
Start: 2018-01-01 | End: 2018-01-01

## 2018-01-01 RX ORDER — LEVOTHYROXINE SODIUM 25 UG/1
25 CAPSULE ORAL DAILY
Qty: 90 CAPSULE | Refills: 3 | Status: CANCELLED | OUTPATIENT
Start: 2018-01-01

## 2018-01-01 RX ORDER — LEVALBUTEROL TARTRATE 45 UG/1
1-2 AEROSOL, METERED ORAL 2 TIMES DAILY
COMMUNITY

## 2018-01-01 RX ORDER — QUETIAPINE FUMARATE 25 MG/1
25 TABLET, FILM COATED ORAL NIGHTLY
Qty: 60 TABLET | Refills: 3 | Status: SHIPPED | OUTPATIENT
Start: 2018-01-01

## 2018-01-01 RX ORDER — WARFARIN SODIUM 1 MG/1
1 TABLET ORAL
Status: ACTIVE | OUTPATIENT
Start: 2018-01-01 | End: 2018-01-01

## 2018-01-01 RX ORDER — ONDANSETRON 2 MG/ML
4 INJECTION INTRAMUSCULAR; INTRAVENOUS EVERY 30 MIN PRN
Status: DISCONTINUED | OUTPATIENT
Start: 2018-01-01 | End: 2018-01-01 | Stop reason: HOSPADM

## 2018-01-01 RX ORDER — DEXAMETHASONE SODIUM PHOSPHATE 4 MG/ML
INJECTION, SOLUTION INTRA-ARTICULAR; INTRALESIONAL; INTRAMUSCULAR; INTRAVENOUS; SOFT TISSUE PRN
Status: DISCONTINUED | OUTPATIENT
Start: 2018-01-01 | End: 2018-01-01 | Stop reason: SDUPTHER

## 2018-01-01 RX ORDER — LEVALBUTEROL TARTRATE 45 UG/1
2 AEROSOL, METERED ORAL 3 TIMES DAILY
Status: DISCONTINUED | OUTPATIENT
Start: 2018-01-01 | End: 2018-01-01 | Stop reason: HOSPADM

## 2018-01-01 RX ORDER — SODIUM CHLORIDE 9 MG/ML
INJECTION, SOLUTION INTRAVENOUS
Status: DISCONTINUED
Start: 2018-01-01 | End: 2018-01-01 | Stop reason: HOSPADM

## 2018-01-01 RX ORDER — WARFARIN SODIUM 1 MG/1
1 TABLET ORAL
Status: DISCONTINUED | OUTPATIENT
Start: 2018-01-01 | End: 2018-01-01 | Stop reason: HOSPADM

## 2018-01-01 RX ORDER — ALBUMIN (HUMAN) 12.5 G/50ML
12.5 SOLUTION INTRAVENOUS PRN
Status: DISCONTINUED | OUTPATIENT
Start: 2018-01-01 | End: 2018-01-01 | Stop reason: HOSPADM

## 2018-01-01 RX ORDER — SODIUM PHOSPHATE, DIBASIC AND SODIUM PHOSPHATE, MONOBASIC 7; 19 G/133ML; G/133ML
ENEMA RECTAL
Status: DISCONTINUED
Start: 2018-01-01 | End: 2018-01-01

## 2018-01-01 RX ORDER — LABETALOL HYDROCHLORIDE 5 MG/ML
5 INJECTION, SOLUTION INTRAVENOUS
Status: DISCONTINUED | OUTPATIENT
Start: 2018-01-01 | End: 2018-01-01 | Stop reason: HOSPADM

## 2018-01-01 RX ORDER — HYDROCODONE BITARTRATE AND ACETAMINOPHEN 5; 325 MG/1; MG/1
1 TABLET ORAL EVERY 8 HOURS PRN
Status: ON HOLD | COMMUNITY
End: 2018-01-01

## 2018-01-01 RX ORDER — LORAZEPAM 0.5 MG/1
0.5 TABLET ORAL EVERY 4 HOURS PRN
Status: DISCONTINUED | OUTPATIENT
Start: 2018-01-01 | End: 2018-01-01 | Stop reason: HOSPADM

## 2018-01-01 RX ORDER — MIDODRINE HYDROCHLORIDE 5 MG/1
10 TABLET ORAL 2 TIMES DAILY
Status: DISCONTINUED | OUTPATIENT
Start: 2018-01-01 | End: 2018-01-01 | Stop reason: HOSPADM

## 2018-01-01 RX ORDER — FLUDEOXYGLUCOSE F 18 200 MCI/ML
12.9 INJECTION, SOLUTION INTRAVENOUS
Status: COMPLETED | OUTPATIENT
Start: 2018-01-01 | End: 2018-01-01

## 2018-01-01 RX ORDER — FAMOTIDINE 20 MG/1
20 TABLET, FILM COATED ORAL NIGHTLY
Status: DISCONTINUED | OUTPATIENT
Start: 2018-01-01 | End: 2018-01-01 | Stop reason: HOSPADM

## 2018-01-01 RX ORDER — INSULIN GLARGINE 100 [IU]/ML
12 INJECTION, SOLUTION SUBCUTANEOUS NIGHTLY
Status: DISCONTINUED | OUTPATIENT
Start: 2018-01-01 | End: 2018-01-01

## 2018-01-01 RX ORDER — INSULIN GLARGINE 100 [IU]/ML
25 INJECTION, SOLUTION SUBCUTANEOUS NIGHTLY
Status: DISCONTINUED | OUTPATIENT
Start: 2018-01-01 | End: 2018-01-01 | Stop reason: HOSPADM

## 2018-01-01 RX ORDER — PANTOPRAZOLE SODIUM 20 MG/1
20 TABLET, DELAYED RELEASE ORAL
Status: DISCONTINUED | OUTPATIENT
Start: 2018-01-01 | End: 2018-01-01 | Stop reason: HOSPADM

## 2018-01-01 RX ORDER — ACETAMINOPHEN 650 MG
TABLET, EXTENDED RELEASE ORAL
Qty: 1 BOTTLE | Refills: 2 | Status: SHIPPED | OUTPATIENT
Start: 2018-01-01 | End: 2018-01-01

## 2018-01-01 RX ORDER — CHOLECALCIFEROL (VITAMIN D3) 10 MCG
1 TABLET ORAL DAILY
Qty: 30 CAPSULE | Refills: 3 | Status: SHIPPED | OUTPATIENT
Start: 2018-01-01

## 2018-01-01 RX ORDER — LEVALBUTEROL TARTRATE 45 UG/1
2 AEROSOL, METERED ORAL 3 TIMES DAILY
Status: DISCONTINUED | OUTPATIENT
Start: 2018-01-01 | End: 2018-01-01

## 2018-01-01 RX ORDER — QUETIAPINE FUMARATE 25 MG/1
12.5 TABLET, FILM COATED ORAL NIGHTLY
Status: DISCONTINUED | OUTPATIENT
Start: 2018-01-01 | End: 2018-01-01

## 2018-01-01 RX ORDER — ONDANSETRON 2 MG/ML
4 INJECTION INTRAMUSCULAR; INTRAVENOUS PRN
Status: DISCONTINUED | OUTPATIENT
Start: 2018-01-01 | End: 2018-01-01 | Stop reason: HOSPADM

## 2018-01-01 RX ORDER — HYDRALAZINE HYDROCHLORIDE 20 MG/ML
5 INJECTION INTRAMUSCULAR; INTRAVENOUS
Status: DISCONTINUED | OUTPATIENT
Start: 2018-01-01 | End: 2018-01-01 | Stop reason: HOSPADM

## 2018-01-01 RX ORDER — LEVOTHYROXINE SODIUM 0.03 MG/1
25 TABLET ORAL DAILY
Qty: 90 TABLET | Refills: 3 | OUTPATIENT
Start: 2018-01-01

## 2018-01-01 RX ORDER — SODIUM CHLORIDE 9 MG/ML
INJECTION, SOLUTION INTRAVENOUS
Status: DISPENSED
Start: 2018-01-01 | End: 2018-01-01

## 2018-01-01 RX ORDER — ISOSORBIDE DINITRATE 10 MG/1
10 TABLET ORAL 2 TIMES DAILY
Status: DISCONTINUED | OUTPATIENT
Start: 2018-01-01 | End: 2018-01-01

## 2018-01-01 RX ORDER — MIDODRINE HYDROCHLORIDE 5 MG/1
5 TABLET ORAL ONCE
Status: COMPLETED | OUTPATIENT
Start: 2018-01-01 | End: 2018-01-01

## 2018-01-01 RX ORDER — ONDANSETRON 2 MG/ML
4 INJECTION INTRAMUSCULAR; INTRAVENOUS ONCE
Status: DISCONTINUED | OUTPATIENT
Start: 2018-01-01 | End: 2018-01-01

## 2018-01-01 RX ORDER — LEVALBUTEROL 1.25 MG/.5ML
1.25 SOLUTION, CONCENTRATE RESPIRATORY (INHALATION) 3 TIMES DAILY
Status: DISCONTINUED | OUTPATIENT
Start: 2018-01-01 | End: 2018-01-01 | Stop reason: HOSPADM

## 2018-01-01 RX ORDER — WARFARIN SODIUM 5 MG/1
5 TABLET ORAL
Status: COMPLETED | OUTPATIENT
Start: 2018-01-01 | End: 2018-01-01

## 2018-01-01 RX ORDER — GENTAMICIN SULFATE 1 MG/G
OINTMENT TOPICAL DAILY
Status: DISCONTINUED | OUTPATIENT
Start: 2018-01-01 | End: 2018-01-01 | Stop reason: HOSPADM

## 2018-01-01 RX ORDER — PHYTONADIONE 5 MG/1
5 TABLET ORAL ONCE
Status: DISCONTINUED | OUTPATIENT
Start: 2018-01-01 | End: 2018-01-01

## 2018-01-01 RX ORDER — MIDODRINE HYDROCHLORIDE 10 MG/1
10 TABLET ORAL 2 TIMES DAILY
Qty: 60 TABLET | Refills: 3 | Status: SHIPPED | OUTPATIENT
Start: 2018-01-01

## 2018-01-01 RX ORDER — POLYETHYLENE GLYCOL 3350 17 G/17G
17 POWDER, FOR SOLUTION ORAL DAILY PRN
Status: DISCONTINUED | OUTPATIENT
Start: 2018-01-01 | End: 2018-01-01 | Stop reason: HOSPADM

## 2018-01-01 RX ORDER — ONDANSETRON 2 MG/ML
4 INJECTION INTRAMUSCULAR; INTRAVENOUS EVERY 6 HOURS PRN
Status: DISCONTINUED | OUTPATIENT
Start: 2018-01-01 | End: 2018-01-01 | Stop reason: SDUPTHER

## 2018-01-01 RX ORDER — ONDANSETRON 4 MG/1
TABLET, ORALLY DISINTEGRATING ORAL
Status: COMPLETED
Start: 2018-01-01 | End: 2018-01-01

## 2018-01-01 RX ADMIN — PIPERACILLIN SODIUM AND TAZOBACTAM SODIUM 3.38 G: 3; .375 INJECTION, POWDER, LYOPHILIZED, FOR SOLUTION INTRAVENOUS at 23:41

## 2018-01-01 RX ADMIN — DOCUSATE SODIUM 100 MG: 100 CAPSULE, LIQUID FILLED ORAL at 19:45

## 2018-01-01 RX ADMIN — WARFARIN SODIUM 5 MG: 5 TABLET ORAL at 17:12

## 2018-01-01 RX ADMIN — HEPARIN SODIUM 5000 UNITS: 5000 INJECTION, SOLUTION INTRAVENOUS; SUBCUTANEOUS at 06:39

## 2018-01-01 RX ADMIN — Medication 10 ML: at 21:59

## 2018-01-01 RX ADMIN — INSULIN LISPRO 1 UNITS: 100 INJECTION, SOLUTION INTRAVENOUS; SUBCUTANEOUS at 12:41

## 2018-01-01 RX ADMIN — LEVALBUTEROL 1.25 MG: 1.25 SOLUTION, CONCENTRATE RESPIRATORY (INHALATION) at 20:11

## 2018-01-01 RX ADMIN — LATANOPROST 1 DROP: 50 SOLUTION OPHTHALMIC at 21:15

## 2018-01-01 RX ADMIN — DORZOLAMIDE HYDROCHLORIDE 1 DROP: 20 SOLUTION/ DROPS OPHTHALMIC at 15:27

## 2018-01-01 RX ADMIN — DORZOLAMIDE HYDROCHLORIDE 1 DROP: 20 SOLUTION/ DROPS OPHTHALMIC at 22:14

## 2018-01-01 RX ADMIN — Medication 10 ML: at 22:49

## 2018-01-01 RX ADMIN — ALBUTEROL SULFATE 0.63 MG: 2.5 SOLUTION RESPIRATORY (INHALATION) at 06:20

## 2018-01-01 RX ADMIN — ISOSORBIDE DINITRATE 10 MG: 10 TABLET ORAL at 20:36

## 2018-01-01 RX ADMIN — ALBUMIN (HUMAN) 12.5 G: 0.25 INJECTION, SOLUTION INTRAVENOUS at 16:34

## 2018-01-01 RX ADMIN — DORZOLAMIDE HYDROCHLORIDE 1 DROP: 20 SOLUTION/ DROPS OPHTHALMIC at 09:24

## 2018-01-01 RX ADMIN — DORZOLAMIDE HYDROCHLORIDE 1 DROP: 20 SOLUTION/ DROPS OPHTHALMIC at 20:33

## 2018-01-01 RX ADMIN — Medication 10 ML: at 09:41

## 2018-01-01 RX ADMIN — DORZOLAMIDE HYDROCHLORIDE 1 DROP: 20 SOLUTION/ DROPS OPHTHALMIC at 09:23

## 2018-01-01 RX ADMIN — Medication 10 ML: at 22:14

## 2018-01-01 RX ADMIN — Medication 2 PUFF: at 18:37

## 2018-01-01 RX ADMIN — ALBUTEROL SULFATE 0.63 MG: 2.5 SOLUTION RESPIRATORY (INHALATION) at 11:29

## 2018-01-01 RX ADMIN — DORZOLAMIDE HYDROCHLORIDE 1 DROP: 20 SOLUTION/ DROPS OPHTHALMIC at 22:49

## 2018-01-01 RX ADMIN — PIPERACILLIN SODIUM AND TAZOBACTAM SODIUM 3.38 G: 3; .375 INJECTION, POWDER, LYOPHILIZED, FOR SOLUTION INTRAVENOUS at 03:30

## 2018-01-01 RX ADMIN — SODIUM CHLORIDE: 9 INJECTION, SOLUTION INTRAVENOUS at 10:35

## 2018-01-01 RX ADMIN — Medication 10 ML: at 09:24

## 2018-01-01 RX ADMIN — HEPARIN SODIUM 5000 UNITS: 5000 INJECTION, SOLUTION INTRAVENOUS; SUBCUTANEOUS at 21:05

## 2018-01-01 RX ADMIN — DORZOLAMIDE HYDROCHLORIDE 1 DROP: 20 SOLUTION/ DROPS OPHTHALMIC at 09:55

## 2018-01-01 RX ADMIN — Medication 2 PUFF: at 07:50

## 2018-01-01 RX ADMIN — DORZOLAMIDE HYDROCHLORIDE 1 DROP: 20 SOLUTION/ DROPS OPHTHALMIC at 09:41

## 2018-01-01 RX ADMIN — ISOSORBIDE DINITRATE 10 MG: 10 TABLET ORAL at 09:10

## 2018-01-01 RX ADMIN — ALBUTEROL SULFATE: 2.5 SOLUTION RESPIRATORY (INHALATION) at 11:05

## 2018-01-01 RX ADMIN — LATANOPROST 1 DROP: 50 SOLUTION/ DROPS OPHTHALMIC at 21:05

## 2018-01-01 RX ADMIN — MIDODRINE HYDROCHLORIDE 10 MG: 5 TABLET ORAL at 16:57

## 2018-01-01 RX ADMIN — ALBUTEROL SULFATE 0.63 MG: 2.5 SOLUTION RESPIRATORY (INHALATION) at 16:18

## 2018-01-01 RX ADMIN — DORZOLAMIDE HYDROCHLORIDE 1 DROP: 20 SOLUTION/ DROPS OPHTHALMIC at 22:03

## 2018-01-01 RX ADMIN — LATANOPROST 1 DROP: 50 SOLUTION/ DROPS OPHTHALMIC at 22:15

## 2018-01-01 RX ADMIN — Medication 10 ML: at 20:05

## 2018-01-01 RX ADMIN — ALBUTEROL SULFATE 2.5 MG: 2.5 SOLUTION RESPIRATORY (INHALATION) at 15:30

## 2018-01-01 RX ADMIN — LATANOPROST 1 DROP: 50 SOLUTION OPHTHALMIC at 20:45

## 2018-01-01 RX ADMIN — ALBUTEROL SULFATE 0.63 MG: 2.5 SOLUTION RESPIRATORY (INHALATION) at 19:12

## 2018-01-01 RX ADMIN — PIPERACILLIN SODIUM AND TAZOBACTAM SODIUM 3.38 G: 3; .375 INJECTION, POWDER, LYOPHILIZED, FOR SOLUTION INTRAVENOUS at 00:38

## 2018-01-01 RX ADMIN — INSULIN GLARGINE 25 UNITS: 100 INJECTION, SOLUTION SUBCUTANEOUS at 22:00

## 2018-01-01 RX ADMIN — QUETIAPINE FUMARATE 12.5 MG: 25 TABLET ORAL at 22:16

## 2018-01-01 RX ADMIN — DOCUSATE SODIUM 100 MG: 100 CAPSULE, LIQUID FILLED ORAL at 09:52

## 2018-01-01 RX ADMIN — INSULIN GLARGINE 12 UNITS: 100 INJECTION, SOLUTION SUBCUTANEOUS at 20:51

## 2018-01-01 RX ADMIN — Medication 10 ML: at 09:14

## 2018-01-01 RX ADMIN — Medication 2 PUFF: at 14:40

## 2018-01-01 RX ADMIN — AMIODARONE HYDROCHLORIDE 100 MG: 200 TABLET ORAL at 09:47

## 2018-01-01 RX ADMIN — HEPARIN SODIUM 5000 UNITS: 5000 INJECTION, SOLUTION INTRAVENOUS; SUBCUTANEOUS at 06:04

## 2018-01-01 RX ADMIN — HEPARIN SODIUM 3500 UNITS: 1000 INJECTION, SOLUTION INTRAVENOUS; SUBCUTANEOUS at 11:33

## 2018-01-01 RX ADMIN — ISOSORBIDE DINITRATE 10 MG: 10 TABLET ORAL at 21:43

## 2018-01-01 RX ADMIN — ISOSORBIDE DINITRATE 10 MG: 10 TABLET ORAL at 09:52

## 2018-01-01 RX ADMIN — PHENYLEPHRINE HYDROCHLORIDE 100 MCG: 10 INJECTION INTRAVENOUS at 10:34

## 2018-01-01 RX ADMIN — GENTAMICIN SULFATE: 1 OINTMENT TOPICAL at 09:54

## 2018-01-01 RX ADMIN — ALBUMIN (HUMAN) 12.5 G: 0.25 INJECTION, SOLUTION INTRAVENOUS at 07:42

## 2018-01-01 RX ADMIN — Medication: at 20:04

## 2018-01-01 RX ADMIN — ALBUTEROL SULFATE 2.5 MG: 2.5 SOLUTION RESPIRATORY (INHALATION) at 11:19

## 2018-01-01 RX ADMIN — MIDODRINE HYDROCHLORIDE 10 MG: 5 TABLET ORAL at 21:55

## 2018-01-01 RX ADMIN — MIDODRINE HYDROCHLORIDE 10 MG: 5 TABLET ORAL at 09:52

## 2018-01-01 RX ADMIN — DORZOLAMIDE HYDROCHLORIDE 1 DROP: 20 SOLUTION/ DROPS OPHTHALMIC at 21:48

## 2018-01-01 RX ADMIN — INSULIN LISPRO 1 UNITS: 100 INJECTION, SOLUTION INTRAVENOUS; SUBCUTANEOUS at 18:05

## 2018-01-01 RX ADMIN — ALBUTEROL SULFATE 2.5 MG: 2.5 SOLUTION RESPIRATORY (INHALATION) at 06:53

## 2018-01-01 RX ADMIN — ALBUTEROL SULFATE 0.63 MG: 2.5 SOLUTION RESPIRATORY (INHALATION) at 19:54

## 2018-01-01 RX ADMIN — Medication 10 ML: at 21:11

## 2018-01-01 RX ADMIN — LEVALBUTEROL 1.25 MG: 1.25 SOLUTION, CONCENTRATE RESPIRATORY (INHALATION) at 13:24

## 2018-01-01 RX ADMIN — LEVALBUTEROL 1.25 MG: 1.25 SOLUTION, CONCENTRATE RESPIRATORY (INHALATION) at 08:24

## 2018-01-01 RX ADMIN — Medication 10 ML: at 08:17

## 2018-01-01 RX ADMIN — BISACODYL 10 MG: 5 TABLET, COATED ORAL at 10:36

## 2018-01-01 RX ADMIN — MORPHINE SULFATE 2 MG: 2 INJECTION, SOLUTION INTRAMUSCULAR; INTRAVENOUS at 23:14

## 2018-01-01 RX ADMIN — ALBUTEROL SULFATE 0.63 MG: 2.5 SOLUTION RESPIRATORY (INHALATION) at 15:35

## 2018-01-01 RX ADMIN — Medication 10 ML: at 21:48

## 2018-01-01 RX ADMIN — PIPERACILLIN SODIUM AND TAZOBACTAM SODIUM 3.38 G: 3; .375 INJECTION, POWDER, LYOPHILIZED, FOR SOLUTION INTRAVENOUS at 11:18

## 2018-01-01 RX ADMIN — PHYTONADIONE 10 MG: 10 INJECTION, EMULSION INTRAMUSCULAR; INTRAVENOUS; SUBCUTANEOUS at 16:22

## 2018-01-01 RX ADMIN — LEVALBUTEROL 1.25 MG: 1.25 SOLUTION, CONCENTRATE RESPIRATORY (INHALATION) at 14:37

## 2018-01-01 RX ADMIN — ISOSORBIDE DINITRATE 10 MG: 10 TABLET ORAL at 08:11

## 2018-01-01 RX ADMIN — PIPERACILLIN SODIUM AND TAZOBACTAM SODIUM 3.38 G: 3; .375 INJECTION, POWDER, LYOPHILIZED, FOR SOLUTION INTRAVENOUS at 01:09

## 2018-01-01 RX ADMIN — QUETIAPINE FUMARATE 12.5 MG: 25 TABLET ORAL at 22:28

## 2018-01-01 RX ADMIN — LATANOPROST 1 DROP: 50 SOLUTION OPHTHALMIC at 20:28

## 2018-01-01 RX ADMIN — ALBUMIN (HUMAN) 12.5 G: 0.25 INJECTION, SOLUTION INTRAVENOUS at 10:51

## 2018-01-01 RX ADMIN — HEPARIN SODIUM 5000 UNITS: 5000 INJECTION, SOLUTION INTRAVENOUS; SUBCUTANEOUS at 20:55

## 2018-01-01 RX ADMIN — LATANOPROST 1 DROP: 50 SOLUTION OPHTHALMIC at 22:14

## 2018-01-01 RX ADMIN — MEROPENEM 500 MG: 500 INJECTION, POWDER, FOR SOLUTION INTRAVENOUS at 21:48

## 2018-01-01 RX ADMIN — PIPERACILLIN SODIUM AND TAZOBACTAM SODIUM 3.38 G: 3; .375 INJECTION, POWDER, LYOPHILIZED, FOR SOLUTION INTRAVENOUS at 12:47

## 2018-01-01 RX ADMIN — Medication 2 PUFF: at 07:39

## 2018-01-01 RX ADMIN — AMIODARONE HYDROCHLORIDE 100 MG: 200 TABLET ORAL at 19:45

## 2018-01-01 RX ADMIN — DORZOLAMIDE HYDROCHLORIDE 1 DROP: 20 SOLUTION/ DROPS OPHTHALMIC at 12:59

## 2018-01-01 RX ADMIN — DEXTROSE MONOHYDRATE 12.5 G: 25 INJECTION, SOLUTION INTRAVENOUS at 12:09

## 2018-01-01 RX ADMIN — MEROPENEM 500 MG: 500 INJECTION, POWDER, FOR SOLUTION INTRAVENOUS at 12:54

## 2018-01-01 RX ADMIN — EPHEDRINE SULFATE 10 MG: 50 INJECTION INTRAVENOUS at 10:37

## 2018-01-01 RX ADMIN — PANTOPRAZOLE SODIUM 20 MG: 20 TABLET, DELAYED RELEASE ORAL at 06:25

## 2018-01-01 RX ADMIN — LUBIPROSTONE 24 MCG: 8 CAPSULE, GELATIN COATED ORAL at 14:29

## 2018-01-01 RX ADMIN — HEPARIN SODIUM 5000 UNITS: 5000 INJECTION, SOLUTION INTRAVENOUS; SUBCUTANEOUS at 14:41

## 2018-01-01 RX ADMIN — MEROPENEM 500 MG: 500 INJECTION, POWDER, FOR SOLUTION INTRAVENOUS at 13:01

## 2018-01-01 RX ADMIN — LEVOTHYROXINE SODIUM 25 MCG: 25 TABLET ORAL at 13:00

## 2018-01-01 RX ADMIN — Medication 2 PUFF: at 12:35

## 2018-01-01 RX ADMIN — LUBIPROSTONE 24 MCG: 8 CAPSULE, GELATIN COATED ORAL at 16:53

## 2018-01-01 RX ADMIN — MIDODRINE HYDROCHLORIDE 10 MG: 5 TABLET ORAL at 15:48

## 2018-01-01 RX ADMIN — ASCORBIC ACID, THIAMINE MONONITRATE,RIBOFLAVIN, NIACINAMIDE, PYRIDOXINE HYDROCHLORIDE, FOLIC ACID, CYANOCOBALAMIN, BIOTIN, CALCIUM PANTOTHENATE, 1 MG: 100; 1.5; 1.7; 20; 10; 1; 6000; 150000; 5 CAPSULE, LIQUID FILLED ORAL at 10:34

## 2018-01-01 RX ADMIN — ASCORBIC ACID, THIAMINE MONONITRATE,RIBOFLAVIN, NIACINAMIDE, PYRIDOXINE HYDROCHLORIDE, FOLIC ACID, CYANOCOBALAMIN, BIOTIN, CALCIUM PANTOTHENATE, 1 MG: 100; 1.5; 1.7; 20; 10; 1; 6000; 150000; 5 CAPSULE, LIQUID FILLED ORAL at 09:20

## 2018-01-01 RX ADMIN — Medication 10 ML: at 09:52

## 2018-01-01 RX ADMIN — ALBUMIN (HUMAN) 25 G: 0.25 INJECTION, SOLUTION INTRAVENOUS at 11:11

## 2018-01-01 RX ADMIN — HEPARIN SODIUM 5000 UNITS: 5000 INJECTION, SOLUTION INTRAVENOUS; SUBCUTANEOUS at 12:41

## 2018-01-01 RX ADMIN — SENNOSIDES 17.2 MG: 8.6 TABLET, FILM COATED ORAL at 14:08

## 2018-01-01 RX ADMIN — LATANOPROST 1 DROP: 50 SOLUTION OPHTHALMIC at 23:03

## 2018-01-01 RX ADMIN — MEROPENEM 500 MG: 500 INJECTION, POWDER, FOR SOLUTION INTRAVENOUS at 09:13

## 2018-01-01 RX ADMIN — INSULIN LISPRO 1 UNITS: 100 INJECTION, SOLUTION INTRAVENOUS; SUBCUTANEOUS at 21:08

## 2018-01-01 RX ADMIN — COLLAGENASE SANTYL: 250 OINTMENT TOPICAL at 09:41

## 2018-01-01 RX ADMIN — INSULIN GLARGINE 12 UNITS: 100 INJECTION, SOLUTION SUBCUTANEOUS at 21:15

## 2018-01-01 RX ADMIN — PIPERACILLIN SODIUM AND TAZOBACTAM SODIUM 3.38 G: 3; .375 INJECTION, POWDER, LYOPHILIZED, FOR SOLUTION INTRAVENOUS at 13:53

## 2018-01-01 RX ADMIN — ALBUTEROL SULFATE 2.5 MG: 2.5 SOLUTION RESPIRATORY (INHALATION) at 14:57

## 2018-01-01 RX ADMIN — PHENYLEPHRINE HYDROCHLORIDE 200 MCG: 10 INJECTION INTRAVENOUS at 10:49

## 2018-01-01 RX ADMIN — ALBUTEROL SULFATE 2.5 MG: 2.5 SOLUTION RESPIRATORY (INHALATION) at 07:27

## 2018-01-01 RX ADMIN — Medication 10 ML: at 11:47

## 2018-01-01 RX ADMIN — HEPARIN SODIUM 5000 UNITS: 5000 INJECTION, SOLUTION INTRAVENOUS; SUBCUTANEOUS at 22:23

## 2018-01-01 RX ADMIN — MEROPENEM 1 G: 1 INJECTION, POWDER, FOR SOLUTION INTRAVENOUS at 11:27

## 2018-01-01 RX ADMIN — Medication 2 PUFF: at 07:20

## 2018-01-01 RX ADMIN — ASCORBIC ACID, THIAMINE MONONITRATE,RIBOFLAVIN, NIACINAMIDE, PYRIDOXINE HYDROCHLORIDE, FOLIC ACID, CYANOCOBALAMIN, BIOTIN, CALCIUM PANTOTHENATE, 1 MG: 100; 1.5; 1.7; 20; 10; 1; 6000; 150000; 5 CAPSULE, LIQUID FILLED ORAL at 19:45

## 2018-01-01 RX ADMIN — PIPERACILLIN SODIUM AND TAZOBACTAM SODIUM 3.38 G: 3; .375 INJECTION, POWDER, LYOPHILIZED, FOR SOLUTION INTRAVENOUS at 12:42

## 2018-01-01 RX ADMIN — Medication 10 ML: at 15:25

## 2018-01-01 RX ADMIN — COLLAGENASE SANTYL: 250 OINTMENT TOPICAL at 12:58

## 2018-01-01 RX ADMIN — AMIODARONE HYDROCHLORIDE 100 MG: 200 TABLET ORAL at 09:41

## 2018-01-01 RX ADMIN — HEPARIN SODIUM 3500 UNITS: 1000 INJECTION, SOLUTION INTRAVENOUS; SUBCUTANEOUS at 11:40

## 2018-01-01 RX ADMIN — LEVALBUTEROL 1.25 MG: 1.25 SOLUTION, CONCENTRATE RESPIRATORY (INHALATION) at 13:13

## 2018-01-01 RX ADMIN — ASCORBIC ACID, THIAMINE MONONITRATE,RIBOFLAVIN, NIACINAMIDE, PYRIDOXINE HYDROCHLORIDE, FOLIC ACID, CYANOCOBALAMIN, BIOTIN, CALCIUM PANTOTHENATE, 1 MG: 100; 1.5; 1.7; 20; 10; 1; 6000; 150000; 5 CAPSULE, LIQUID FILLED ORAL at 09:41

## 2018-01-01 RX ADMIN — MIDODRINE HYDROCHLORIDE 10 MG: 5 TABLET ORAL at 15:05

## 2018-01-01 RX ADMIN — Medication 2 PUFF: at 13:59

## 2018-01-01 RX ADMIN — MIDODRINE HYDROCHLORIDE 10 MG: 5 TABLET ORAL at 09:13

## 2018-01-01 RX ADMIN — ALBUTEROL SULFATE 0.63 MG: 2.5 SOLUTION RESPIRATORY (INHALATION) at 07:28

## 2018-01-01 RX ADMIN — AMIODARONE HYDROCHLORIDE 100 MG: 200 TABLET ORAL at 12:43

## 2018-01-01 RX ADMIN — Medication 2 PUFF: at 19:02

## 2018-01-01 RX ADMIN — HYDROCODONE BITARTRATE AND ACETAMINOPHEN 1 TABLET: 5; 325 TABLET ORAL at 00:38

## 2018-01-01 RX ADMIN — AMIODARONE HYDROCHLORIDE 100 MG: 200 TABLET ORAL at 09:10

## 2018-01-01 RX ADMIN — Medication 2 PUFF: at 15:00

## 2018-01-01 RX ADMIN — Medication 2 PUFF: at 07:37

## 2018-01-01 RX ADMIN — Medication 2 PUFF: at 19:29

## 2018-01-01 RX ADMIN — LEVOTHYROXINE SODIUM 25 MCG: 25 TABLET ORAL at 09:06

## 2018-01-01 RX ADMIN — Medication 2 PUFF: at 19:50

## 2018-01-01 RX ADMIN — HEPARIN SODIUM 5000 UNITS: 5000 INJECTION, SOLUTION INTRAVENOUS; SUBCUTANEOUS at 22:20

## 2018-01-01 RX ADMIN — HEPARIN SODIUM 5000 UNITS: 5000 INJECTION, SOLUTION INTRAVENOUS; SUBCUTANEOUS at 04:40

## 2018-01-01 RX ADMIN — Medication 10 ML: at 21:44

## 2018-01-01 RX ADMIN — COLLAGENASE SANTYL: 250 OINTMENT TOPICAL at 12:47

## 2018-01-01 RX ADMIN — ALBUMIN (HUMAN) 12.5 G: 0.25 INJECTION, SOLUTION INTRAVENOUS at 08:50

## 2018-01-01 RX ADMIN — HEPARIN SODIUM 5000 UNITS: 5000 INJECTION, SOLUTION INTRAVENOUS; SUBCUTANEOUS at 21:16

## 2018-01-01 RX ADMIN — INSULIN LISPRO 1 UNITS: 100 INJECTION, SOLUTION INTRAVENOUS; SUBCUTANEOUS at 22:00

## 2018-01-01 RX ADMIN — HEPARIN SODIUM 5000 UNITS: 5000 INJECTION, SOLUTION INTRAVENOUS; SUBCUTANEOUS at 05:46

## 2018-01-01 RX ADMIN — ALBUMIN (HUMAN) 12.5 G: 0.25 INJECTION, SOLUTION INTRAVENOUS at 17:17

## 2018-01-01 RX ADMIN — LATANOPROST 1 DROP: 50 SOLUTION OPHTHALMIC at 01:53

## 2018-01-01 RX ADMIN — LEVOTHYROXINE SODIUM 25 MCG: 25 TABLET ORAL at 05:01

## 2018-01-01 RX ADMIN — ALBUTEROL SULFATE 0.63 MG: 2.5 SOLUTION RESPIRATORY (INHALATION) at 07:38

## 2018-01-01 RX ADMIN — DORZOLAMIDE HYDROCHLORIDE 1 DROP: 20 SOLUTION/ DROPS OPHTHALMIC at 09:10

## 2018-01-01 RX ADMIN — ALBUTEROL SULFATE 0.63 MG: 2.5 SOLUTION RESPIRATORY (INHALATION) at 15:08

## 2018-01-01 RX ADMIN — AMIODARONE HYDROCHLORIDE 100 MG: 200 TABLET ORAL at 09:52

## 2018-01-01 RX ADMIN — MIDODRINE HYDROCHLORIDE 10 MG: 5 TABLET ORAL at 11:56

## 2018-01-01 RX ADMIN — ONDANSETRON 4 MG: 4 TABLET, ORALLY DISINTEGRATING ORAL at 06:12

## 2018-01-01 RX ADMIN — COLLAGENASE SANTYL: 250 OINTMENT TOPICAL at 16:59

## 2018-01-01 RX ADMIN — LATANOPROST 1 DROP: 50 SOLUTION OPHTHALMIC at 20:51

## 2018-01-01 RX ADMIN — MEROPENEM 500 MG: 500 INJECTION, POWDER, FOR SOLUTION INTRAVENOUS at 09:23

## 2018-01-01 RX ADMIN — MIDODRINE HYDROCHLORIDE 10 MG: 5 TABLET ORAL at 21:48

## 2018-01-01 RX ADMIN — DORZOLAMIDE HYDROCHLORIDE 1 DROP: 20 SOLUTION/ DROPS OPHTHALMIC at 09:07

## 2018-01-01 RX ADMIN — DORZOLAMIDE HYDROCHLORIDE 1 DROP: 20 SOLUTION/ DROPS OPHTHALMIC at 22:15

## 2018-01-01 RX ADMIN — INSULIN GLARGINE 12 UNITS: 100 INJECTION, SOLUTION SUBCUTANEOUS at 20:37

## 2018-01-01 RX ADMIN — HEPARIN SODIUM 5000 UNITS: 5000 INJECTION, SOLUTION INTRAVENOUS; SUBCUTANEOUS at 22:27

## 2018-01-01 RX ADMIN — COLLAGENASE SANTYL: 250 OINTMENT TOPICAL at 18:24

## 2018-01-01 RX ADMIN — Medication: at 21:08

## 2018-01-01 RX ADMIN — MIDODRINE HYDROCHLORIDE 10 MG: 5 TABLET ORAL at 08:17

## 2018-01-01 RX ADMIN — PHENYLEPHRINE HYDROCHLORIDE 200 MCG: 10 INJECTION INTRAVENOUS at 10:41

## 2018-01-01 RX ADMIN — ONDANSETRON 4 MG: 2 INJECTION INTRAMUSCULAR; INTRAVENOUS at 16:18

## 2018-01-01 RX ADMIN — SENNOSIDES 17.2 MG: 8.6 TABLET, FILM COATED ORAL at 20:32

## 2018-01-01 RX ADMIN — ALBUTEROL SULFATE 2.5 MG: 2.5 SOLUTION RESPIRATORY (INHALATION) at 19:25

## 2018-01-01 RX ADMIN — PIPERACILLIN SODIUM AND TAZOBACTAM SODIUM 3.38 G: 3; .375 INJECTION, POWDER, LYOPHILIZED, FOR SOLUTION INTRAVENOUS at 01:46

## 2018-01-01 RX ADMIN — Medication 10 ML: at 10:46

## 2018-01-01 RX ADMIN — LEVOTHYROXINE SODIUM 25 MCG: 25 TABLET ORAL at 05:43

## 2018-01-01 RX ADMIN — AMIODARONE HYDROCHLORIDE 100 MG: 200 TABLET ORAL at 08:11

## 2018-01-01 RX ADMIN — DORZOLAMIDE HYDROCHLORIDE 1 DROP: 20 SOLUTION/ DROPS OPHTHALMIC at 23:03

## 2018-01-01 RX ADMIN — ASCORBIC ACID, THIAMINE MONONITRATE,RIBOFLAVIN, NIACINAMIDE, PYRIDOXINE HYDROCHLORIDE, FOLIC ACID, CYANOCOBALAMIN, BIOTIN, CALCIUM PANTOTHENATE, 1 MG: 100; 1.5; 1.7; 20; 10; 1; 6000; 150000; 5 CAPSULE, LIQUID FILLED ORAL at 17:54

## 2018-01-01 RX ADMIN — QUETIAPINE FUMARATE: 25 TABLET ORAL at 22:20

## 2018-01-01 RX ADMIN — DEXTROSE AND SODIUM CHLORIDE 500 ML: 5; 900 INJECTION, SOLUTION INTRAVENOUS at 15:15

## 2018-01-01 RX ADMIN — MEROPENEM 500 MG: 500 INJECTION, POWDER, FOR SOLUTION INTRAVENOUS at 09:10

## 2018-01-01 RX ADMIN — ALBUTEROL SULFATE 2.5 MG: 2.5 SOLUTION RESPIRATORY (INHALATION) at 19:21

## 2018-01-01 RX ADMIN — ASCORBIC ACID, THIAMINE MONONITRATE,RIBOFLAVIN, NIACINAMIDE, PYRIDOXINE HYDROCHLORIDE, FOLIC ACID, CYANOCOBALAMIN, BIOTIN, CALCIUM PANTOTHENATE, 1 MG: 100; 1.5; 1.7; 20; 10; 1; 6000; 150000; 5 CAPSULE, LIQUID FILLED ORAL at 11:46

## 2018-01-01 RX ADMIN — HEPARIN SODIUM 5000 UNITS: 5000 INJECTION, SOLUTION INTRAVENOUS; SUBCUTANEOUS at 13:06

## 2018-01-01 RX ADMIN — AMIODARONE HYDROCHLORIDE 100 MG: 200 TABLET ORAL at 09:51

## 2018-01-01 RX ADMIN — FAMOTIDINE 20 MG: 20 TABLET ORAL at 21:48

## 2018-01-01 RX ADMIN — LEVALBUTEROL 1.25 MG: 1.25 SOLUTION, CONCENTRATE RESPIRATORY (INHALATION) at 13:31

## 2018-01-01 RX ADMIN — AMIODARONE HYDROCHLORIDE 100 MG: 200 TABLET ORAL at 15:29

## 2018-01-01 RX ADMIN — ALBUMIN (HUMAN) 25 G: 0.25 INJECTION, SOLUTION INTRAVENOUS at 07:04

## 2018-01-01 RX ADMIN — Medication 10 ML: at 13:01

## 2018-01-01 RX ADMIN — BISACODYL 10 MG: 10 SUPPOSITORY RECTAL at 23:50

## 2018-01-01 RX ADMIN — Medication 10 ML: at 21:30

## 2018-01-01 RX ADMIN — ASCORBIC ACID, THIAMINE MONONITRATE,RIBOFLAVIN, NIACINAMIDE, PYRIDOXINE HYDROCHLORIDE, FOLIC ACID, CYANOCOBALAMIN, BIOTIN, CALCIUM PANTOTHENATE, 1 MG: 100; 1.5; 1.7; 20; 10; 1; 6000; 150000; 5 CAPSULE, LIQUID FILLED ORAL at 09:13

## 2018-01-01 RX ADMIN — Medication: at 09:10

## 2018-01-01 RX ADMIN — DORZOLAMIDE HYDROCHLORIDE 1 DROP: 20 SOLUTION/ DROPS OPHTHALMIC at 01:53

## 2018-01-01 RX ADMIN — PIPERACILLIN SODIUM AND TAZOBACTAM SODIUM 3.38 G: 3; .375 INJECTION, POWDER, LYOPHILIZED, FOR SOLUTION INTRAVENOUS at 00:05

## 2018-01-01 RX ADMIN — FENTANYL CITRATE 50 MCG: 50 INJECTION INTRAMUSCULAR; INTRAVENOUS at 10:35

## 2018-01-01 RX ADMIN — QUETIAPINE FUMARATE 25 MG: 25 TABLET ORAL at 21:48

## 2018-01-01 RX ADMIN — LEVOTHYROXINE SODIUM 25 MCG: 25 TABLET ORAL at 05:40

## 2018-01-01 RX ADMIN — MIDODRINE HYDROCHLORIDE 10 MG: 5 TABLET ORAL at 20:36

## 2018-01-01 RX ADMIN — ASCORBIC ACID, THIAMINE MONONITRATE,RIBOFLAVIN, NIACINAMIDE, PYRIDOXINE HYDROCHLORIDE, FOLIC ACID, CYANOCOBALAMIN, BIOTIN, CALCIUM PANTOTHENATE, 1 MG: 100; 1.5; 1.7; 20; 10; 1; 6000; 150000; 5 CAPSULE, LIQUID FILLED ORAL at 08:17

## 2018-01-01 RX ADMIN — SODIUM CHLORIDE 250 ML: 9 INJECTION, SOLUTION INTRAVENOUS at 21:00

## 2018-01-01 RX ADMIN — SODIUM CHLORIDE 250 ML: 9 INJECTION, SOLUTION INTRAVENOUS at 23:10

## 2018-01-01 RX ADMIN — ALBUTEROL SULFATE 0.63 MG: 2.5 SOLUTION RESPIRATORY (INHALATION) at 07:39

## 2018-01-01 RX ADMIN — BISACODYL 10 MG: 5 TABLET, COATED ORAL at 14:30

## 2018-01-01 RX ADMIN — DORZOLAMIDE HYDROCHLORIDE 1 DROP: 20 SOLUTION/ DROPS OPHTHALMIC at 21:14

## 2018-01-01 RX ADMIN — WARFARIN SODIUM 2 MG: 2 TABLET ORAL at 20:26

## 2018-01-01 RX ADMIN — SENNOSIDES 17.2 MG: 8.6 TABLET, FILM COATED ORAL at 08:11

## 2018-01-01 RX ADMIN — Medication 10 ML: at 21:09

## 2018-01-01 RX ADMIN — DEXTROSE MONOHYDRATE 12.5 G: 25 INJECTION, SOLUTION INTRAVENOUS at 07:29

## 2018-01-01 RX ADMIN — LEVOTHYROXINE SODIUM 25 MCG: 25 TABLET ORAL at 06:39

## 2018-01-01 RX ADMIN — HEPARIN SODIUM 5000 UNITS: 5000 INJECTION, SOLUTION INTRAVENOUS; SUBCUTANEOUS at 14:51

## 2018-01-01 RX ADMIN — QUETIAPINE FUMARATE 12.5 MG: 25 TABLET ORAL at 20:44

## 2018-01-01 RX ADMIN — HEPARIN SODIUM 3500 UNITS: 1000 INJECTION, SOLUTION INTRAVENOUS; SUBCUTANEOUS at 18:22

## 2018-01-01 RX ADMIN — VANCOMYCIN HYDROCHLORIDE 1250 MG: 1 INJECTION, POWDER, LYOPHILIZED, FOR SOLUTION INTRAVENOUS at 14:53

## 2018-01-01 RX ADMIN — SENNOSIDES 17.2 MG: 8.6 TABLET, FILM COATED ORAL at 13:00

## 2018-01-01 RX ADMIN — MIDODRINE HYDROCHLORIDE 10 MG: 5 TABLET ORAL at 16:02

## 2018-01-01 RX ADMIN — DORZOLAMIDE HYDROCHLORIDE 1 DROP: 20 SOLUTION/ DROPS OPHTHALMIC at 21:58

## 2018-01-01 RX ADMIN — WARFARIN SODIUM 2 MG: 2 TABLET ORAL at 21:48

## 2018-01-01 RX ADMIN — DEXTROSE MONOHYDRATE 12.5 G: 25 INJECTION, SOLUTION INTRAVENOUS at 06:50

## 2018-01-01 RX ADMIN — HEPARIN SODIUM 5000 UNITS: 5000 INJECTION, SOLUTION INTRAVENOUS; SUBCUTANEOUS at 06:17

## 2018-01-01 RX ADMIN — ISOSORBIDE DINITRATE 10 MG: 10 TABLET ORAL at 10:36

## 2018-01-01 RX ADMIN — CEFTRIAXONE SODIUM 1 G: 1 INJECTION, POWDER, FOR SOLUTION INTRAMUSCULAR; INTRAVENOUS at 06:03

## 2018-01-01 RX ADMIN — LEVOTHYROXINE SODIUM 25 MCG: 25 TABLET ORAL at 05:41

## 2018-01-01 RX ADMIN — Medication: at 09:11

## 2018-01-01 RX ADMIN — Medication 10 ML: at 20:24

## 2018-01-01 RX ADMIN — MIDODRINE HYDROCHLORIDE 10 MG: 5 TABLET ORAL at 16:51

## 2018-01-01 RX ADMIN — DORZOLAMIDE HYDROCHLORIDE 1 DROP: 20 SOLUTION/ DROPS OPHTHALMIC at 09:13

## 2018-01-01 RX ADMIN — MEROPENEM 500 MG: 500 INJECTION, POWDER, FOR SOLUTION INTRAVENOUS at 10:18

## 2018-01-01 RX ADMIN — HEPARIN SODIUM 5000 UNITS: 5000 INJECTION, SOLUTION INTRAVENOUS; SUBCUTANEOUS at 20:15

## 2018-01-01 RX ADMIN — MIDODRINE HYDROCHLORIDE 10 MG: 5 TABLET ORAL at 17:24

## 2018-01-01 RX ADMIN — DOXERCALCIFEROL 2 MCG: 2 INJECTION, SOLUTION INTRAVENOUS at 17:29

## 2018-01-01 RX ADMIN — EPHEDRINE SULFATE 10 MG: 50 INJECTION INTRAVENOUS at 11:15

## 2018-01-01 RX ADMIN — PROPOFOL 70 MG: 10 INJECTION, EMULSION INTRAVENOUS at 10:15

## 2018-01-01 RX ADMIN — ALBUTEROL SULFATE: 2.5 SOLUTION RESPIRATORY (INHALATION) at 07:18

## 2018-01-01 RX ADMIN — INSULIN GLARGINE 12 UNITS: 100 INJECTION, SOLUTION SUBCUTANEOUS at 23:13

## 2018-01-01 RX ADMIN — HEPARIN SODIUM 5000 UNITS: 5000 INJECTION, SOLUTION INTRAVENOUS; SUBCUTANEOUS at 13:53

## 2018-01-01 RX ADMIN — ONDANSETRON 4 MG: 2 INJECTION INTRAMUSCULAR; INTRAVENOUS at 15:58

## 2018-01-01 RX ADMIN — HEPARIN SODIUM 3500 UNITS: 1000 INJECTION, SOLUTION INTRAVENOUS; SUBCUTANEOUS at 11:51

## 2018-01-01 RX ADMIN — DORZOLAMIDE HYDROCHLORIDE 1 DROP: 20 SOLUTION/ DROPS OPHTHALMIC at 21:15

## 2018-01-01 RX ADMIN — DORZOLAMIDE HYDROCHLORIDE 1 DROP: 20 SOLUTION/ DROPS OPHTHALMIC at 20:27

## 2018-01-01 RX ADMIN — MIDODRINE HYDROCHLORIDE 10 MG: 5 TABLET ORAL at 14:08

## 2018-01-01 RX ADMIN — Medication 10 ML: at 12:12

## 2018-01-01 RX ADMIN — WARFARIN SODIUM 2 MG: 2 TABLET ORAL at 17:46

## 2018-01-01 RX ADMIN — LEVOTHYROXINE SODIUM 25 MCG: 25 TABLET ORAL at 06:25

## 2018-01-01 RX ADMIN — HYDROCODONE BITARTRATE AND ACETAMINOPHEN 1 TABLET: 5; 325 TABLET ORAL at 21:11

## 2018-01-01 RX ADMIN — SODIUM CHLORIDE 500 ML: 9 INJECTION, SOLUTION INTRAVENOUS at 00:38

## 2018-01-01 RX ADMIN — MIDODRINE HYDROCHLORIDE 10 MG: 5 TABLET ORAL at 08:10

## 2018-01-01 RX ADMIN — DOCUSATE SODIUM 100 MG: 100 CAPSULE, LIQUID FILLED ORAL at 08:11

## 2018-01-01 RX ADMIN — MIDODRINE HYDROCHLORIDE 10 MG: 5 TABLET ORAL at 07:52

## 2018-01-01 RX ADMIN — PIPERACILLIN SODIUM AND TAZOBACTAM SODIUM 3.38 G: 3; .375 INJECTION, POWDER, LYOPHILIZED, FOR SOLUTION INTRAVENOUS at 23:47

## 2018-01-01 RX ADMIN — Medication: at 09:54

## 2018-01-01 RX ADMIN — DORZOLAMIDE HYDROCHLORIDE 1 DROP: 20 SOLUTION/ DROPS OPHTHALMIC at 11:08

## 2018-01-01 RX ADMIN — LEVOTHYROXINE SODIUM 25 MCG: 25 TABLET ORAL at 06:18

## 2018-01-01 RX ADMIN — ONDANSETRON 4 MG: 2 INJECTION INTRAMUSCULAR; INTRAVENOUS at 10:40

## 2018-01-01 RX ADMIN — MIDODRINE HYDROCHLORIDE 10 MG: 5 TABLET ORAL at 20:10

## 2018-01-01 RX ADMIN — LEVOTHYROXINE SODIUM 25 MCG: 25 TABLET ORAL at 06:51

## 2018-01-01 RX ADMIN — SODIUM CHLORIDE: 900 INJECTION INTRAVENOUS at 10:05

## 2018-01-01 RX ADMIN — ALBUMIN (HUMAN) 12.5 G: 0.25 INJECTION, SOLUTION INTRAVENOUS at 11:49

## 2018-01-01 RX ADMIN — QUETIAPINE FUMARATE 12.5 MG: 25 TABLET ORAL at 20:25

## 2018-01-01 RX ADMIN — PHENYLEPHRINE HYDROCHLORIDE 200 MCG: 10 INJECTION INTRAVENOUS at 11:15

## 2018-01-01 RX ADMIN — INSULIN GLARGINE 25 UNITS: 100 INJECTION, SOLUTION SUBCUTANEOUS at 21:07

## 2018-01-01 RX ADMIN — ALBUTEROL SULFATE 0.63 MG: 2.5 SOLUTION RESPIRATORY (INHALATION) at 19:24

## 2018-01-01 RX ADMIN — LEVOTHYROXINE SODIUM 25 MCG: 25 TABLET ORAL at 06:44

## 2018-01-01 RX ADMIN — LORAZEPAM 0.5 MG: 2 INJECTION INTRAMUSCULAR; INTRAVENOUS at 21:01

## 2018-01-01 RX ADMIN — ONDANSETRON 4 MG: 2 INJECTION INTRAMUSCULAR; INTRAVENOUS at 10:35

## 2018-01-01 RX ADMIN — LEVOTHYROXINE SODIUM 25 MCG: 25 TABLET ORAL at 05:30

## 2018-01-01 RX ADMIN — LATANOPROST 1 DROP: 50 SOLUTION/ DROPS OPHTHALMIC at 20:58

## 2018-01-01 RX ADMIN — HEPARIN SODIUM 5000 UNITS: 5000 INJECTION, SOLUTION INTRAVENOUS; SUBCUTANEOUS at 04:59

## 2018-01-01 RX ADMIN — Medication 10 ML: at 09:36

## 2018-01-01 RX ADMIN — ALBUTEROL SULFATE 2.5 MG: 2.5 SOLUTION RESPIRATORY (INHALATION) at 06:58

## 2018-01-01 RX ADMIN — DORZOLAMIDE HYDROCHLORIDE 1 DROP: 20 SOLUTION/ DROPS OPHTHALMIC at 20:36

## 2018-01-01 RX ADMIN — MIDODRINE HYDROCHLORIDE 10 MG: 5 TABLET ORAL at 15:44

## 2018-01-01 RX ADMIN — DORZOLAMIDE HYDROCHLORIDE 1 DROP: 20 SOLUTION/ DROPS OPHTHALMIC at 19:56

## 2018-01-01 RX ADMIN — MIDODRINE HYDROCHLORIDE 10 MG: 5 TABLET ORAL at 09:28

## 2018-01-01 RX ADMIN — MIDODRINE HYDROCHLORIDE 10 MG: 5 TABLET ORAL at 09:19

## 2018-01-01 RX ADMIN — LATANOPROST 1 DROP: 50 SOLUTION/ DROPS OPHTHALMIC at 21:14

## 2018-01-01 RX ADMIN — DORZOLAMIDE HYDROCHLORIDE 1 DROP: 20 SOLUTION/ DROPS OPHTHALMIC at 09:18

## 2018-01-01 RX ADMIN — SENNOSIDES 17.2 MG: 8.6 TABLET, FILM COATED ORAL at 20:16

## 2018-01-01 RX ADMIN — LEVALBUTEROL 1.25 MG: 1.25 SOLUTION, CONCENTRATE RESPIRATORY (INHALATION) at 07:00

## 2018-01-01 RX ADMIN — ASCORBIC ACID, THIAMINE MONONITRATE,RIBOFLAVIN, NIACINAMIDE, PYRIDOXINE HYDROCHLORIDE, FOLIC ACID, CYANOCOBALAMIN, BIOTIN, CALCIUM PANTOTHENATE, 1 MG: 100; 1.5; 1.7; 20; 10; 1; 6000; 150000; 5 CAPSULE, LIQUID FILLED ORAL at 09:52

## 2018-01-01 RX ADMIN — COLLAGENASE SANTYL: 250 OINTMENT TOPICAL at 09:11

## 2018-01-01 RX ADMIN — ALBUTEROL SULFATE 0.63 MG: 2.5 SOLUTION RESPIRATORY (INHALATION) at 07:48

## 2018-01-01 RX ADMIN — ALBUTEROL SULFATE 0.63 MG: 2.5 SOLUTION RESPIRATORY (INHALATION) at 20:14

## 2018-01-01 RX ADMIN — DORZOLAMIDE HYDROCHLORIDE 1 DROP: 20 SOLUTION/ DROPS OPHTHALMIC at 22:18

## 2018-01-01 RX ADMIN — MIDODRINE HYDROCHLORIDE 10 MG: 5 TABLET ORAL at 17:19

## 2018-01-01 RX ADMIN — HEPARIN SODIUM 5000 UNITS: 5000 INJECTION, SOLUTION INTRAVENOUS; SUBCUTANEOUS at 05:41

## 2018-01-01 RX ADMIN — HEPARIN SODIUM 5000 UNITS: 5000 INJECTION, SOLUTION INTRAVENOUS; SUBCUTANEOUS at 05:11

## 2018-01-01 RX ADMIN — QUETIAPINE FUMARATE 25 MG: 25 TABLET ORAL at 20:36

## 2018-01-01 RX ADMIN — AMIODARONE HYDROCHLORIDE 100 MG: 200 TABLET ORAL at 10:35

## 2018-01-01 RX ADMIN — QUETIAPINE FUMARATE 12.5 MG: 25 TABLET ORAL at 20:15

## 2018-01-01 RX ADMIN — ISOSORBIDE DINITRATE 10 MG: 10 TABLET ORAL at 21:48

## 2018-01-01 RX ADMIN — Medication 10 ML: at 08:39

## 2018-01-01 RX ADMIN — PHENYLEPHRINE HYDROCHLORIDE 100 MCG: 10 INJECTION INTRAVENOUS at 10:51

## 2018-01-01 RX ADMIN — ALBUTEROL SULFATE 0.63 MG: 2.5 SOLUTION RESPIRATORY (INHALATION) at 07:19

## 2018-01-01 RX ADMIN — Medication 2 PUFF: at 07:38

## 2018-01-01 RX ADMIN — TORSEMIDE 20 MG: 20 TABLET ORAL at 09:51

## 2018-01-01 RX ADMIN — Medication 10 ML: at 22:37

## 2018-01-01 RX ADMIN — EPHEDRINE SULFATE 10 MG: 50 INJECTION INTRAVENOUS at 10:50

## 2018-01-01 RX ADMIN — Medication 10 ML: at 08:35

## 2018-01-01 RX ADMIN — DORZOLAMIDE HYDROCHLORIDE 1 DROP: 20 SOLUTION/ DROPS OPHTHALMIC at 08:21

## 2018-01-01 RX ADMIN — PROCHLORPERAZINE EDISYLATE 10 MG: 5 INJECTION INTRAMUSCULAR; INTRAVENOUS at 20:32

## 2018-01-01 RX ADMIN — ALBUMIN (HUMAN) 25 G: 0.25 INJECTION, SOLUTION INTRAVENOUS at 11:24

## 2018-01-01 RX ADMIN — LATANOPROST 1 DROP: 50 SOLUTION OPHTHALMIC at 19:56

## 2018-01-01 RX ADMIN — LEVOTHYROXINE SODIUM 25 MCG: 25 TABLET ORAL at 19:45

## 2018-01-01 RX ADMIN — LEVOTHYROXINE SODIUM 25 MCG: 25 TABLET ORAL at 06:11

## 2018-01-01 RX ADMIN — Medication 500 ML: at 05:41

## 2018-01-01 RX ADMIN — SENNOSIDES 17.2 MG: 8.6 TABLET, FILM COATED ORAL at 21:14

## 2018-01-01 RX ADMIN — Medication: at 13:16

## 2018-01-01 RX ADMIN — VANCOMYCIN HYDROCHLORIDE 500 MG: 500 INJECTION, POWDER, LYOPHILIZED, FOR SOLUTION INTRAVENOUS at 09:10

## 2018-01-01 RX ADMIN — LORAZEPAM 0.5 MG: 2 INJECTION INTRAMUSCULAR; INTRAVENOUS at 06:31

## 2018-01-01 RX ADMIN — PANTOPRAZOLE SODIUM 20 MG: 20 TABLET, DELAYED RELEASE ORAL at 06:51

## 2018-01-01 RX ADMIN — DORZOLAMIDE HYDROCHLORIDE 1 DROP: 20 SOLUTION/ DROPS OPHTHALMIC at 13:29

## 2018-01-01 RX ADMIN — Medication 2 PUFF: at 15:09

## 2018-01-01 RX ADMIN — HEPARIN SODIUM 3500 UNITS: 1000 INJECTION, SOLUTION INTRAVENOUS; SUBCUTANEOUS at 13:42

## 2018-01-01 RX ADMIN — LEVALBUTEROL 1.25 MG: 1.25 SOLUTION, CONCENTRATE RESPIRATORY (INHALATION) at 07:59

## 2018-01-01 RX ADMIN — PIPERACILLIN SODIUM AND TAZOBACTAM SODIUM 3.38 G: 3; .375 INJECTION, POWDER, LYOPHILIZED, FOR SOLUTION INTRAVENOUS at 15:48

## 2018-01-01 RX ADMIN — PHENYLEPHRINE HYDROCHLORIDE 100 MCG: 10 INJECTION INTRAVENOUS at 10:15

## 2018-01-01 RX ADMIN — PHENYLEPHRINE HYDROCHLORIDE 100 MCG: 10 INJECTION INTRAVENOUS at 10:32

## 2018-01-01 RX ADMIN — PHENYLEPHRINE HYDROCHLORIDE 200 MCG: 10 INJECTION INTRAVENOUS at 10:53

## 2018-01-01 RX ADMIN — EPHEDRINE SULFATE 5 MG: 50 INJECTION INTRAVENOUS at 10:47

## 2018-01-01 RX ADMIN — ALBUTEROL SULFATE 0.63 MG: 2.5 SOLUTION RESPIRATORY (INHALATION) at 11:17

## 2018-01-01 RX ADMIN — SENNOSIDES 17.2 MG: 8.6 TABLET, FILM COATED ORAL at 20:36

## 2018-01-01 RX ADMIN — WARFARIN SODIUM 2 MG: 2 TABLET ORAL at 17:34

## 2018-01-01 RX ADMIN — DORZOLAMIDE HYDROCHLORIDE 1 DROP: 20 SOLUTION/ DROPS OPHTHALMIC at 08:26

## 2018-01-01 RX ADMIN — DORZOLAMIDE HYDROCHLORIDE 1 DROP: 20 SOLUTION/ DROPS OPHTHALMIC at 15:51

## 2018-01-01 RX ADMIN — LORAZEPAM 0.5 MG: 2 INJECTION INTRAMUSCULAR; INTRAVENOUS at 08:34

## 2018-01-01 RX ADMIN — DORZOLAMIDE HYDROCHLORIDE 1 DROP: 20 SOLUTION/ DROPS OPHTHALMIC at 09:12

## 2018-01-01 RX ADMIN — LEVALBUTEROL 1.25 MG: 1.25 SOLUTION, CONCENTRATE RESPIRATORY (INHALATION) at 06:50

## 2018-01-01 RX ADMIN — PIPERACILLIN SODIUM AND TAZOBACTAM SODIUM 3.38 G: 3; .375 INJECTION, POWDER, LYOPHILIZED, FOR SOLUTION INTRAVENOUS at 23:30

## 2018-01-01 RX ADMIN — DORZOLAMIDE HYDROCHLORIDE 1 DROP: 20 SOLUTION/ DROPS OPHTHALMIC at 10:47

## 2018-01-01 RX ADMIN — Medication: at 17:57

## 2018-01-01 RX ADMIN — LATANOPROST 1 DROP: 50 SOLUTION/ DROPS OPHTHALMIC at 20:33

## 2018-01-01 RX ADMIN — AMIODARONE HYDROCHLORIDE 100 MG: 200 TABLET ORAL at 09:28

## 2018-01-01 RX ADMIN — ALBUTEROL SULFATE 0.63 MG: 2.5 SOLUTION RESPIRATORY (INHALATION) at 18:50

## 2018-01-01 RX ADMIN — HEPARIN SODIUM 5000 UNITS: 5000 INJECTION, SOLUTION INTRAVENOUS; SUBCUTANEOUS at 16:21

## 2018-01-01 RX ADMIN — LEVALBUTEROL 1.25 MG: 1.25 SOLUTION, CONCENTRATE RESPIRATORY (INHALATION) at 20:53

## 2018-01-01 RX ADMIN — EPHEDRINE SULFATE 10 MG: 50 INJECTION INTRAVENOUS at 10:21

## 2018-01-01 RX ADMIN — Medication 10 ML: at 10:43

## 2018-01-01 RX ADMIN — AMIODARONE HYDROCHLORIDE 100 MG: 200 TABLET ORAL at 09:13

## 2018-01-01 RX ADMIN — INSULIN GLARGINE 12 UNITS: 100 INJECTION, SOLUTION SUBCUTANEOUS at 22:15

## 2018-01-01 RX ADMIN — QUETIAPINE FUMARATE 25 MG: 25 TABLET ORAL at 22:03

## 2018-01-01 RX ADMIN — ALBUMIN (HUMAN) 12.5 G: 0.25 INJECTION, SOLUTION INTRAVENOUS at 16:03

## 2018-01-01 RX ADMIN — LEVALBUTEROL 1.25 MG: 1.25 SOLUTION, CONCENTRATE RESPIRATORY (INHALATION) at 19:14

## 2018-01-01 RX ADMIN — MIDODRINE HYDROCHLORIDE 10 MG: 5 TABLET ORAL at 13:52

## 2018-01-01 RX ADMIN — ALBUMIN (HUMAN) 25 G: 0.25 INJECTION, SOLUTION INTRAVENOUS at 13:26

## 2018-01-01 RX ADMIN — DORZOLAMIDE HYDROCHLORIDE 1 DROP: 20 SOLUTION/ DROPS OPHTHALMIC at 21:41

## 2018-01-01 RX ADMIN — EPHEDRINE SULFATE 10 MG: 50 INJECTION INTRAVENOUS at 10:39

## 2018-01-01 RX ADMIN — LEVALBUTEROL 1.25 MG: 1.25 SOLUTION, CONCENTRATE RESPIRATORY (INHALATION) at 07:35

## 2018-01-01 RX ADMIN — WARFARIN SODIUM 2.5 MG: 2.5 TABLET ORAL at 17:32

## 2018-01-01 RX ADMIN — DORZOLAMIDE HYDROCHLORIDE 1 DROP: 20 SOLUTION/ DROPS OPHTHALMIC at 20:25

## 2018-01-01 RX ADMIN — BISACODYL 10 MG: 10 SUPPOSITORY RECTAL at 22:55

## 2018-01-01 RX ADMIN — MIDODRINE HYDROCHLORIDE 5 MG: 5 TABLET ORAL at 04:38

## 2018-01-01 RX ADMIN — Medication 10 ML: at 22:03

## 2018-01-01 RX ADMIN — LEVALBUTEROL 1.25 MG: 1.25 SOLUTION, CONCENTRATE RESPIRATORY (INHALATION) at 19:34

## 2018-01-01 RX ADMIN — HEPARIN SODIUM 3500 UNITS: 1000 INJECTION, SOLUTION INTRAVENOUS; SUBCUTANEOUS at 13:46

## 2018-01-01 RX ADMIN — VANCOMYCIN HYDROCHLORIDE 1000 MG: 1 INJECTION, POWDER, LYOPHILIZED, FOR SOLUTION INTRAVENOUS at 11:36

## 2018-01-01 RX ADMIN — SODIUM CHLORIDE: 9 INJECTION, SOLUTION INTRAVENOUS at 09:06

## 2018-01-01 RX ADMIN — LATANOPROST 1 DROP: 50 SOLUTION OPHTHALMIC at 21:58

## 2018-01-01 RX ADMIN — QUETIAPINE FUMARATE 12.5 MG: 25 TABLET ORAL at 21:29

## 2018-01-01 RX ADMIN — LATANOPROST 1 DROP: 50 SOLUTION OPHTHALMIC at 21:48

## 2018-01-01 RX ADMIN — Medication 10 ML: at 09:09

## 2018-01-01 RX ADMIN — Medication 2 PUFF: at 13:31

## 2018-01-01 RX ADMIN — ALBUTEROL SULFATE 0.63 MG: 2.5 SOLUTION RESPIRATORY (INHALATION) at 15:00

## 2018-01-01 RX ADMIN — ASCORBIC ACID, THIAMINE MONONITRATE,RIBOFLAVIN, NIACINAMIDE, PYRIDOXINE HYDROCHLORIDE, FOLIC ACID, CYANOCOBALAMIN, BIOTIN, CALCIUM PANTOTHENATE, 1 MG: 100; 1.5; 1.7; 20; 10; 1; 6000; 150000; 5 CAPSULE, LIQUID FILLED ORAL at 13:00

## 2018-01-01 RX ADMIN — Medication 2 PUFF: at 07:08

## 2018-01-01 RX ADMIN — INSULIN GLARGINE 12 UNITS: 100 INJECTION, SOLUTION SUBCUTANEOUS at 20:07

## 2018-01-01 RX ADMIN — DORZOLAMIDE HYDROCHLORIDE 1 DROP: 20 SOLUTION/ DROPS OPHTHALMIC at 09:51

## 2018-01-01 RX ADMIN — DORZOLAMIDE HYDROCHLORIDE 1 DROP: 20 SOLUTION/ DROPS OPHTHALMIC at 20:16

## 2018-01-01 RX ADMIN — DORZOLAMIDE HYDROCHLORIDE 1 DROP: 20 SOLUTION/ DROPS OPHTHALMIC at 20:45

## 2018-01-01 RX ADMIN — PIPERACILLIN SODIUM AND TAZOBACTAM SODIUM 3.38 G: 3; .375 INJECTION, POWDER, LYOPHILIZED, FOR SOLUTION INTRAVENOUS at 13:23

## 2018-01-01 RX ADMIN — WARFARIN SODIUM 2 MG: 2 TABLET ORAL at 16:50

## 2018-01-01 RX ADMIN — Medication 2 PUFF: at 19:52

## 2018-01-01 RX ADMIN — HEPARIN SODIUM 5000 UNITS: 5000 INJECTION, SOLUTION INTRAVENOUS; SUBCUTANEOUS at 20:38

## 2018-01-01 RX ADMIN — ALBUMIN (HUMAN) 12.5 G: 0.25 INJECTION, SOLUTION INTRAVENOUS at 11:50

## 2018-01-01 RX ADMIN — MIDODRINE HYDROCHLORIDE 10 MG: 5 TABLET ORAL at 09:21

## 2018-01-01 RX ADMIN — FAMOTIDINE 20 MG: 20 TABLET ORAL at 21:09

## 2018-01-01 RX ADMIN — ALBUTEROL SULFATE 0.63 MG: 2.5 SOLUTION RESPIRATORY (INHALATION) at 10:48

## 2018-01-01 RX ADMIN — LATANOPROST 1 DROP: 50 SOLUTION OPHTHALMIC at 20:36

## 2018-01-01 RX ADMIN — DORZOLAMIDE HYDROCHLORIDE 1 DROP: 20 SOLUTION/ DROPS OPHTHALMIC at 11:47

## 2018-01-01 RX ADMIN — Medication 2 PUFF: at 13:09

## 2018-01-01 RX ADMIN — LATANOPROST 1 DROP: 50 SOLUTION/ DROPS OPHTHALMIC at 23:03

## 2018-01-01 RX ADMIN — DOCUSATE SODIUM 100 MG: 100 CAPSULE, LIQUID FILLED ORAL at 17:12

## 2018-01-01 RX ADMIN — LATANOPROST 1 DROP: 50 SOLUTION OPHTHALMIC at 22:49

## 2018-01-01 RX ADMIN — LEVOTHYROXINE SODIUM 25 MCG: 25 TABLET ORAL at 06:54

## 2018-01-01 RX ADMIN — SODIUM CHLORIDE 500 ML: 9 INJECTION, SOLUTION INTRAVENOUS at 11:04

## 2018-01-01 RX ADMIN — HEPARIN SODIUM 5000 UNITS: 5000 INJECTION, SOLUTION INTRAVENOUS; SUBCUTANEOUS at 06:31

## 2018-01-01 RX ADMIN — PIPERACILLIN SODIUM,TAZOBACTAM SODIUM 2.25 G: 2; .25 INJECTION, POWDER, FOR SOLUTION INTRAVENOUS at 01:53

## 2018-01-01 RX ADMIN — Medication 10 ML: at 09:07

## 2018-01-01 RX ADMIN — QUETIAPINE FUMARATE 12.5 MG: 25 TABLET ORAL at 20:32

## 2018-01-01 RX ADMIN — ALBUTEROL SULFATE 0.63 MG: 2.5 SOLUTION RESPIRATORY (INHALATION) at 19:21

## 2018-01-01 RX ADMIN — ASCORBIC ACID, THIAMINE MONONITRATE,RIBOFLAVIN, NIACINAMIDE, PYRIDOXINE HYDROCHLORIDE, FOLIC ACID, CYANOCOBALAMIN, BIOTIN, CALCIUM PANTOTHENATE, 1 MG: 100; 1.5; 1.7; 20; 10; 1; 6000; 150000; 5 CAPSULE, LIQUID FILLED ORAL at 08:11

## 2018-01-01 RX ADMIN — PIPERACILLIN SODIUM,TAZOBACTAM SODIUM 2.25 G: 2; .25 INJECTION, POWDER, FOR SOLUTION INTRAVENOUS at 14:41

## 2018-01-01 RX ADMIN — CARVEDILOL 3.12 MG: 3.12 TABLET, FILM COATED ORAL at 22:56

## 2018-01-01 RX ADMIN — SENNOSIDES 17.2 MG: 8.6 TABLET, FILM COATED ORAL at 09:52

## 2018-01-01 RX ADMIN — ALBUTEROL SULFATE 0.63 MG: 2.5 SOLUTION RESPIRATORY (INHALATION) at 14:50

## 2018-01-01 RX ADMIN — SODIUM CHLORIDE 250 ML: 9 INJECTION, SOLUTION INTRAVENOUS at 23:33

## 2018-01-01 RX ADMIN — AMIODARONE HYDROCHLORIDE 100 MG: 200 TABLET ORAL at 08:17

## 2018-01-01 RX ADMIN — WARFARIN SODIUM 2 MG: 2 TABLET ORAL at 17:24

## 2018-01-01 RX ADMIN — MIDODRINE HYDROCHLORIDE 10 MG: 5 TABLET ORAL at 20:56

## 2018-01-01 RX ADMIN — SODIUM CHLORIDE, SODIUM LACTATE, POTASSIUM CHLORIDE, CALCIUM CHLORIDE: 600; 310; 30; 20 INJECTION, SOLUTION INTRAVENOUS at 11:40

## 2018-01-01 RX ADMIN — EPHEDRINE SULFATE 10 MG: 50 INJECTION INTRAVENOUS at 10:45

## 2018-01-01 RX ADMIN — DEXAMETHASONE SODIUM PHOSPHATE 4 MG: 4 INJECTION, SOLUTION INTRAMUSCULAR; INTRAVENOUS at 10:40

## 2018-01-01 RX ADMIN — PIPERACILLIN SODIUM AND TAZOBACTAM SODIUM 3.38 G: 3; .375 INJECTION, POWDER, LYOPHILIZED, FOR SOLUTION INTRAVENOUS at 17:17

## 2018-01-01 RX ADMIN — DORZOLAMIDE HYDROCHLORIDE 1 DROP: 20 SOLUTION/ DROPS OPHTHALMIC at 21:05

## 2018-01-01 RX ADMIN — HEPARIN SODIUM 5000 UNITS: 5000 INJECTION, SOLUTION INTRAVENOUS; SUBCUTANEOUS at 16:18

## 2018-01-01 RX ADMIN — ISOSORBIDE DINITRATE 10 MG: 10 TABLET ORAL at 08:17

## 2018-01-01 RX ADMIN — LEVOTHYROXINE SODIUM 25 MCG: 25 TABLET ORAL at 04:39

## 2018-01-01 RX ADMIN — SENNOSIDES 17.2 MG: 8.6 TABLET, FILM COATED ORAL at 09:36

## 2018-01-01 RX ADMIN — ALBUMIN (HUMAN) 12.5 G: 0.25 INJECTION, SOLUTION INTRAVENOUS at 09:41

## 2018-01-01 RX ADMIN — PHYTONADIONE 10 MG: 5 TABLET ORAL at 15:45

## 2018-01-01 RX ADMIN — FENTANYL CITRATE 25 MCG: 50 INJECTION INTRAMUSCULAR; INTRAVENOUS at 10:21

## 2018-01-01 RX ADMIN — Medication 10 ML: at 09:28

## 2018-01-01 RX ADMIN — ALBUMIN (HUMAN) 25 G: 0.25 INJECTION, SOLUTION INTRAVENOUS at 11:44

## 2018-01-01 RX ADMIN — ALBUTEROL SULFATE 2.5 MG: 2.5 SOLUTION RESPIRATORY (INHALATION) at 15:52

## 2018-01-01 RX ADMIN — Medication 10 ML: at 21:14

## 2018-01-01 RX ADMIN — ASCORBIC ACID, THIAMINE MONONITRATE,RIBOFLAVIN, NIACINAMIDE, PYRIDOXINE HYDROCHLORIDE, FOLIC ACID, CYANOCOBALAMIN, BIOTIN, CALCIUM PANTOTHENATE, 1 MG: 100; 1.5; 1.7; 20; 10; 1; 6000; 150000; 5 CAPSULE, LIQUID FILLED ORAL at 12:59

## 2018-01-01 RX ADMIN — ISOSORBIDE DINITRATE 10 MG: 10 TABLET ORAL at 20:51

## 2018-01-01 RX ADMIN — Medication 10 ML: at 20:48

## 2018-01-01 RX ADMIN — PIPERACILLIN SODIUM,TAZOBACTAM SODIUM 2.25 G: 2; .25 INJECTION, POWDER, FOR SOLUTION INTRAVENOUS at 14:08

## 2018-01-01 RX ADMIN — MIDODRINE HYDROCHLORIDE 10 MG: 5 TABLET ORAL at 00:39

## 2018-01-01 RX ADMIN — SODIUM CHLORIDE 500 ML: 9 INJECTION, SOLUTION INTRAVENOUS at 05:41

## 2018-01-01 RX ADMIN — WARFARIN SODIUM 2.5 MG: 2.5 TABLET ORAL at 18:25

## 2018-01-01 RX ADMIN — MIDODRINE HYDROCHLORIDE 10 MG: 5 TABLET ORAL at 12:00

## 2018-01-01 RX ADMIN — PIPERACILLIN SODIUM AND TAZOBACTAM SODIUM 3.38 G: 3; .375 INJECTION, POWDER, LYOPHILIZED, FOR SOLUTION INTRAVENOUS at 15:51

## 2018-01-01 RX ADMIN — LATANOPROST 1 DROP: 50 SOLUTION/ DROPS OPHTHALMIC at 22:03

## 2018-01-01 RX ADMIN — DORZOLAMIDE HYDROCHLORIDE 1 DROP: 20 SOLUTION/ DROPS OPHTHALMIC at 20:51

## 2018-01-01 RX ADMIN — MIDODRINE HYDROCHLORIDE 10 MG: 5 TABLET ORAL at 11:46

## 2018-01-01 RX ADMIN — Medication 2 PUFF: at 07:23

## 2018-01-01 RX ADMIN — HEPARIN SODIUM 5000 UNITS: 5000 INJECTION, SOLUTION INTRAVENOUS; SUBCUTANEOUS at 22:47

## 2018-01-01 RX ADMIN — PIPERACILLIN SODIUM AND TAZOBACTAM SODIUM 3.38 G: 3; .375 INJECTION, POWDER, LYOPHILIZED, FOR SOLUTION INTRAVENOUS at 02:19

## 2018-01-01 RX ADMIN — LIDOCAINE HYDROCHLORIDE 20 MG: 20 INJECTION, SOLUTION INFILTRATION; PERINEURAL at 10:35

## 2018-01-01 RX ADMIN — DORZOLAMIDE HYDROCHLORIDE 1 DROP: 20 SOLUTION/ DROPS OPHTHALMIC at 09:09

## 2018-01-01 RX ADMIN — LATANOPROST 1 DROP: 50 SOLUTION OPHTHALMIC at 21:11

## 2018-01-01 RX ADMIN — Medication 2 PUFF: at 10:51

## 2018-01-01 RX ADMIN — Medication 2 PUFF: at 15:24

## 2018-01-01 RX ADMIN — DORZOLAMIDE HYDROCHLORIDE 1 DROP: 20 SOLUTION/ DROPS OPHTHALMIC at 20:58

## 2018-01-01 RX ADMIN — ASCORBIC ACID, THIAMINE MONONITRATE,RIBOFLAVIN, NIACINAMIDE, PYRIDOXINE HYDROCHLORIDE, FOLIC ACID, CYANOCOBALAMIN, BIOTIN, CALCIUM PANTOTHENATE, 1 MG: 100; 1.5; 1.7; 20; 10; 1; 6000; 150000; 5 CAPSULE, LIQUID FILLED ORAL at 12:43

## 2018-01-01 RX ADMIN — SENNOSIDES 17.2 MG: 8.6 TABLET, FILM COATED ORAL at 21:55

## 2018-01-01 RX ADMIN — MIDODRINE HYDROCHLORIDE 10 MG: 5 TABLET ORAL at 12:14

## 2018-01-01 RX ADMIN — LUBIPROSTONE 24 MCG: 8 CAPSULE, GELATIN COATED ORAL at 18:46

## 2018-01-01 RX ADMIN — WARFARIN SODIUM 2 MG: 2 TABLET ORAL at 17:13

## 2018-01-01 RX ADMIN — Medication: at 16:59

## 2018-01-01 RX ADMIN — LEVOTHYROXINE SODIUM 25 MCG: 25 TABLET ORAL at 08:17

## 2018-01-01 RX ADMIN — Medication 10 ML: at 23:03

## 2018-01-01 RX ADMIN — DORZOLAMIDE HYDROCHLORIDE 1 DROP: 20 SOLUTION/ DROPS OPHTHALMIC at 10:44

## 2018-01-01 RX ADMIN — Medication 0.5 MG: at 15:59

## 2018-01-01 RX ADMIN — ALBUTEROL SULFATE 2.5 MG: 2.5 SOLUTION RESPIRATORY (INHALATION) at 19:15

## 2018-01-01 RX ADMIN — MEROPENEM 500 MG: 500 INJECTION, POWDER, FOR SOLUTION INTRAVENOUS at 12:14

## 2018-01-01 RX ADMIN — INSULIN LISPRO 2 UNITS: 100 INJECTION, SOLUTION INTRAVENOUS; SUBCUTANEOUS at 17:12

## 2018-01-01 RX ADMIN — AMIODARONE HYDROCHLORIDE 100 MG: 200 TABLET ORAL at 13:05

## 2018-01-01 RX ADMIN — Medication 2 G: at 10:21

## 2018-01-01 RX ADMIN — LEVALBUTEROL 1.25 MG: 1.25 SOLUTION, CONCENTRATE RESPIRATORY (INHALATION) at 13:42

## 2018-01-01 RX ADMIN — HEPARIN SODIUM 5000 UNITS: 5000 INJECTION, SOLUTION INTRAVENOUS; SUBCUTANEOUS at 15:05

## 2018-01-01 RX ADMIN — LEVALBUTEROL 1.25 MG: 1.25 SOLUTION, CONCENTRATE RESPIRATORY (INHALATION) at 22:00

## 2018-01-01 RX ADMIN — PIPERACILLIN SODIUM AND TAZOBACTAM SODIUM 3.38 G: 3; .375 INJECTION, POWDER, LYOPHILIZED, FOR SOLUTION INTRAVENOUS at 14:50

## 2018-01-01 RX ADMIN — Medication 10 ML: at 15:52

## 2018-01-01 RX ADMIN — MIDODRINE HYDROCHLORIDE 10 MG: 5 TABLET ORAL at 17:32

## 2018-01-01 RX ADMIN — DORZOLAMIDE HYDROCHLORIDE 1 DROP: 20 SOLUTION/ DROPS OPHTHALMIC at 08:11

## 2018-01-01 RX ADMIN — Medication 10 ML: at 09:12

## 2018-01-01 RX ADMIN — LATANOPROST 1 DROP: 50 SOLUTION/ DROPS OPHTHALMIC at 22:18

## 2018-01-01 RX ADMIN — ALBUTEROL SULFATE 0.63 MG: 2.5 SOLUTION RESPIRATORY (INHALATION) at 15:12

## 2018-01-01 RX ADMIN — PIPERACILLIN, TAZOBACTAM 4.5 G: 4; .5 INJECTION, POWDER, LYOPHILIZED, FOR SOLUTION INTRAVENOUS at 20:00

## 2018-01-01 RX ADMIN — Medication 2 PUFF: at 06:54

## 2018-01-01 RX ADMIN — TORSEMIDE 20 MG: 20 TABLET ORAL at 17:12

## 2018-01-01 RX ADMIN — HEPARIN SODIUM 5000 UNITS: 5000 INJECTION, SOLUTION INTRAVENOUS; SUBCUTANEOUS at 14:02

## 2018-01-01 RX ADMIN — Medication 10 ML: at 20:21

## 2018-01-01 RX ADMIN — ISOSORBIDE DINITRATE 10 MG: 10 TABLET ORAL at 21:08

## 2018-01-01 RX ADMIN — ALBUTEROL SULFATE 2.5 MG: 2.5 SOLUTION RESPIRATORY (INHALATION) at 14:16

## 2018-01-01 RX ADMIN — Medication 2 PUFF: at 13:10

## 2018-01-01 RX ADMIN — Medication 2 PUFF: at 10:46

## 2018-01-01 RX ADMIN — FENTANYL CITRATE 25 MCG: 50 INJECTION INTRAMUSCULAR; INTRAVENOUS at 10:15

## 2018-01-01 RX ADMIN — Medication 2 PUFF: at 20:54

## 2018-01-01 RX ADMIN — ALBUTEROL SULFATE 0.63 MG: 2.5 SOLUTION RESPIRATORY (INHALATION) at 11:28

## 2018-01-01 RX ADMIN — MIDODRINE HYDROCHLORIDE 10 MG: 5 TABLET ORAL at 13:04

## 2018-01-01 RX ADMIN — LUBIPROSTONE 24 MCG: 8 CAPSULE, GELATIN COATED ORAL at 18:04

## 2018-01-01 RX ADMIN — ALBUTEROL SULFATE 2.5 MG: 2.5 SOLUTION RESPIRATORY (INHALATION) at 02:33

## 2018-01-01 RX ADMIN — WARFARIN SODIUM 2 MG: 2 TABLET ORAL at 17:49

## 2018-01-01 RX ADMIN — LEVOTHYROXINE SODIUM 25 MCG: 25 TABLET ORAL at 05:46

## 2018-01-01 RX ADMIN — PHYTONADIONE 5 MG: 5 TABLET ORAL at 16:21

## 2018-01-01 RX ADMIN — LORAZEPAM 0.5 MG: 2 INJECTION INTRAMUSCULAR; INTRAVENOUS at 22:26

## 2018-01-01 RX ADMIN — DORZOLAMIDE HYDROCHLORIDE 1 DROP: 20 SOLUTION/ DROPS OPHTHALMIC at 08:57

## 2018-01-01 RX ADMIN — Medication 10 ML: at 20:16

## 2018-01-01 RX ADMIN — Medication 10 ML: at 20:27

## 2018-01-01 RX ADMIN — LEVOTHYROXINE SODIUM 25 MCG: 25 TABLET ORAL at 06:58

## 2018-01-01 RX ADMIN — IPRATROPIUM BROMIDE AND ALBUTEROL SULFATE 1 AMPULE: .5; 3 SOLUTION RESPIRATORY (INHALATION) at 08:59

## 2018-01-01 RX ADMIN — LATANOPROST 1 DROP: 50 SOLUTION OPHTHALMIC at 20:24

## 2018-01-01 RX ADMIN — HEPARIN SODIUM 3500 UNITS: 1000 INJECTION, SOLUTION INTRAVENOUS; SUBCUTANEOUS at 11:12

## 2018-01-01 RX ADMIN — VANCOMYCIN HYDROCHLORIDE 500 MG: 500 INJECTION, POWDER, LYOPHILIZED, FOR SOLUTION INTRAVENOUS at 13:18

## 2018-01-01 RX ADMIN — SENNOSIDES 17.2 MG: 8.6 TABLET, FILM COATED ORAL at 09:21

## 2018-01-01 RX ADMIN — PROPOFOL 140 MG: 10 INJECTION, EMULSION INTRAVENOUS at 10:35

## 2018-01-01 RX ADMIN — LEVALBUTEROL 1.25 MG: 1.25 SOLUTION, CONCENTRATE RESPIRATORY (INHALATION) at 13:41

## 2018-01-01 RX ADMIN — Medication 2 PUFF: at 11:22

## 2018-01-01 RX ADMIN — LATANOPROST 1 DROP: 50 SOLUTION OPHTHALMIC at 21:05

## 2018-01-01 RX ADMIN — PIPERACILLIN SODIUM AND TAZOBACTAM SODIUM 3.38 G: 3; .375 INJECTION, POWDER, LYOPHILIZED, FOR SOLUTION INTRAVENOUS at 17:01

## 2018-01-01 RX ADMIN — FLUDEOXYGLUCOSE F 18 12.9 MILLICURIE: 200 INJECTION, SOLUTION INTRAVENOUS at 13:10

## 2018-01-01 RX ADMIN — LIDOCAINE HYDROCHLORIDE 40 MG: 20 INJECTION, SOLUTION INFILTRATION; PERINEURAL at 10:15

## 2018-01-01 RX ADMIN — INSULIN GLARGINE 12 UNITS: 100 INJECTION, SOLUTION SUBCUTANEOUS at 21:11

## 2018-01-01 RX ADMIN — Medication 2 PUFF: at 19:14

## 2018-01-01 RX ADMIN — SODIUM CHLORIDE 250 ML: 9 INJECTION, SOLUTION INTRAVENOUS at 01:48

## 2018-01-01 RX ADMIN — COLLAGENASE SANTYL: 250 OINTMENT TOPICAL at 12:41

## 2018-01-01 RX ADMIN — INSULIN LISPRO 1 UNITS: 100 INJECTION, SOLUTION INTRAVENOUS; SUBCUTANEOUS at 17:49

## 2018-01-01 RX ADMIN — PIPERACILLIN SODIUM AND TAZOBACTAM SODIUM 3.38 G: 3; .375 INJECTION, POWDER, LYOPHILIZED, FOR SOLUTION INTRAVENOUS at 14:04

## 2018-01-01 RX ADMIN — LEVOFLOXACIN 750 MG: 5 INJECTION, SOLUTION INTRAVENOUS at 23:04

## 2018-01-01 RX ADMIN — LATANOPROST 1 DROP: 50 SOLUTION OPHTHALMIC at 20:16

## 2018-01-01 RX ADMIN — ALBUTEROL SULFATE 0.63 MG: 2.5 SOLUTION RESPIRATORY (INHALATION) at 19:32

## 2018-01-01 RX ADMIN — HEPARIN SODIUM 3500 UNITS: 1000 INJECTION, SOLUTION INTRAVENOUS; SUBCUTANEOUS at 11:04

## 2018-01-01 RX ADMIN — PIPERACILLIN SODIUM AND TAZOBACTAM SODIUM 3.38 G: 3; .375 INJECTION, POWDER, LYOPHILIZED, FOR SOLUTION INTRAVENOUS at 14:59

## 2018-01-01 RX ADMIN — QUETIAPINE FUMARATE 25 MG: 25 TABLET ORAL at 20:32

## 2018-01-01 RX ADMIN — LEVOTHYROXINE SODIUM 25 MCG: 25 TABLET ORAL at 06:31

## 2018-01-01 RX ADMIN — MIDODRINE HYDROCHLORIDE 10 MG: 5 TABLET ORAL at 17:00

## 2018-01-01 RX ADMIN — MIDODRINE HYDROCHLORIDE 10 MG: 5 TABLET ORAL at 12:30

## 2018-01-01 RX ADMIN — GENTAMICIN SULFATE: 1 OINTMENT TOPICAL at 21:48

## 2018-01-01 RX ADMIN — Medication 2 PUFF: at 19:06

## 2018-01-01 RX ADMIN — LEVALBUTEROL 1.25 MG: 1.25 SOLUTION, CONCENTRATE RESPIRATORY (INHALATION) at 20:19

## 2018-01-01 RX ADMIN — MIDODRINE HYDROCHLORIDE 10 MG: 5 TABLET ORAL at 16:36

## 2018-01-01 RX ADMIN — MEROPENEM 500 MG: 500 INJECTION, POWDER, FOR SOLUTION INTRAVENOUS at 09:24

## 2018-01-01 RX ADMIN — Medication 10 ML: at 12:59

## 2018-01-01 RX ADMIN — SODIUM CHLORIDE 250 ML: 9 INJECTION, SOLUTION INTRAVENOUS at 22:21

## 2018-01-01 RX ADMIN — VANCOMYCIN HYDROCHLORIDE 1000 MG: 1 INJECTION, POWDER, LYOPHILIZED, FOR SOLUTION INTRAVENOUS at 10:46

## 2018-01-01 RX ADMIN — LEVOTHYROXINE SODIUM 25 MCG: 25 TABLET ORAL at 06:15

## 2018-01-01 RX ADMIN — PIPERACILLIN SODIUM AND TAZOBACTAM SODIUM 3.38 G: 3; .375 INJECTION, POWDER, LYOPHILIZED, FOR SOLUTION INTRAVENOUS at 04:14

## 2018-01-01 RX ADMIN — ALBUTEROL SULFATE 0.63 MG: 2.5 SOLUTION RESPIRATORY (INHALATION) at 15:13

## 2018-01-01 RX ADMIN — COLLAGENASE SANTYL: 250 OINTMENT TOPICAL at 09:22

## 2018-01-01 RX ADMIN — ALBUTEROL SULFATE 2.5 MG: 2.5 SOLUTION RESPIRATORY (INHALATION) at 15:09

## 2018-01-01 RX ADMIN — Medication 10 ML: at 21:13

## 2018-01-01 RX ADMIN — MEROPENEM 500 MG: 500 INJECTION, POWDER, FOR SOLUTION INTRAVENOUS at 09:12

## 2018-01-01 RX ADMIN — ALBUTEROL SULFATE 2.5 MG: 2.5 SOLUTION RESPIRATORY (INHALATION) at 19:00

## 2018-01-01 RX ADMIN — PHYTONADIONE 10 MG: 10 INJECTION, EMULSION INTRAMUSCULAR; INTRAVENOUS; SUBCUTANEOUS at 21:56

## 2018-01-01 RX ADMIN — ASCORBIC ACID, THIAMINE MONONITRATE,RIBOFLAVIN, NIACINAMIDE, PYRIDOXINE HYDROCHLORIDE, FOLIC ACID, CYANOCOBALAMIN, BIOTIN, CALCIUM PANTOTHENATE, 1 MG: 100; 1.5; 1.7; 20; 10; 1; 6000; 150000; 5 CAPSULE, LIQUID FILLED ORAL at 09:28

## 2018-01-01 RX ADMIN — ALBUMIN (HUMAN) 12.5 G: 0.25 INJECTION, SOLUTION INTRAVENOUS at 08:04

## 2018-01-01 RX ADMIN — DORZOLAMIDE HYDROCHLORIDE 1 DROP: 20 SOLUTION/ DROPS OPHTHALMIC at 21:11

## 2018-01-01 RX ADMIN — ALBUMIN (HUMAN) 12.5 G: 0.25 INJECTION, SOLUTION INTRAVENOUS at 13:42

## 2018-01-01 RX ADMIN — ALBUMIN (HUMAN) 12.5 G: 0.25 INJECTION, SOLUTION INTRAVENOUS at 17:27

## 2018-01-01 RX ADMIN — SODIUM CHLORIDE 250 ML: 9 INJECTION, SOLUTION INTRAVENOUS at 21:49

## 2018-01-01 RX ADMIN — HEPARIN SODIUM 5000 UNITS: 5000 INJECTION, SOLUTION INTRAVENOUS; SUBCUTANEOUS at 20:56

## 2018-01-01 RX ADMIN — HEPARIN SODIUM 5000 UNITS: 5000 INJECTION, SOLUTION INTRAVENOUS; SUBCUTANEOUS at 05:07

## 2018-01-01 RX ADMIN — PIPERACILLIN SODIUM AND TAZOBACTAM SODIUM 3.38 G: 3; .375 INJECTION, POWDER, LYOPHILIZED, FOR SOLUTION INTRAVENOUS at 15:23

## 2018-01-01 RX ADMIN — LORAZEPAM 0.5 MG: 2 INJECTION INTRAMUSCULAR; INTRAVENOUS at 23:46

## 2018-01-01 RX ADMIN — HEPARIN SODIUM 5000 UNITS: 5000 INJECTION, SOLUTION INTRAVENOUS; SUBCUTANEOUS at 21:30

## 2018-01-01 RX ADMIN — LUBIPROSTONE 24 MCG: 8 CAPSULE, GELATIN COATED ORAL at 16:56

## 2018-01-01 RX ADMIN — QUETIAPINE FUMARATE 25 MG: 25 TABLET ORAL at 20:51

## 2018-01-01 RX ADMIN — PIPERACILLIN SODIUM AND TAZOBACTAM SODIUM 3.38 G: 3; .375 INJECTION, POWDER, LYOPHILIZED, FOR SOLUTION INTRAVENOUS at 01:07

## 2018-01-01 RX ADMIN — MIDODRINE HYDROCHLORIDE 10 MG: 5 TABLET ORAL at 10:34

## 2018-01-01 RX ADMIN — Medication 10 ML: at 20:33

## 2018-01-01 RX ADMIN — LATANOPROST 1 DROP: 50 SOLUTION/ DROPS OPHTHALMIC at 21:41

## 2018-01-01 RX ADMIN — VANCOMYCIN HYDROCHLORIDE 500 MG: 500 INJECTION, POWDER, LYOPHILIZED, FOR SOLUTION INTRAVENOUS at 13:26

## 2018-01-01 RX ADMIN — AMIODARONE HYDROCHLORIDE 100 MG: 200 TABLET ORAL at 09:20

## 2018-01-01 RX ADMIN — OXYCODONE HYDROCHLORIDE AND ACETAMINOPHEN 2 TABLET: 5; 325 TABLET ORAL at 17:15

## 2018-01-01 RX ADMIN — ALBUTEROL SULFATE 2.5 MG: 2.5 SOLUTION RESPIRATORY (INHALATION) at 19:05

## 2018-01-01 RX ADMIN — SENNOSIDES 17.2 MG: 8.6 TABLET, FILM COATED ORAL at 11:46

## 2018-01-01 RX ADMIN — VANCOMYCIN HYDROCHLORIDE 500 MG: 500 INJECTION, POWDER, LYOPHILIZED, FOR SOLUTION INTRAVENOUS at 13:45

## 2018-01-01 RX ADMIN — LEVOTHYROXINE SODIUM 25 MCG: 25 TABLET ORAL at 05:36

## 2018-01-01 RX ADMIN — SENNOSIDES 17.2 MG: 8.6 TABLET, FILM COATED ORAL at 20:26

## 2018-01-01 RX ADMIN — HEPARIN SODIUM 5000 UNITS: 5000 INJECTION, SOLUTION INTRAVENOUS; SUBCUTANEOUS at 05:10

## 2018-01-01 RX ADMIN — COLLAGENASE SANTYL: 250 OINTMENT TOPICAL at 11:47

## 2018-01-01 RX ADMIN — DORZOLAMIDE HYDROCHLORIDE 1 DROP: 20 SOLUTION/ DROPS OPHTHALMIC at 08:41

## 2018-01-01 RX ADMIN — QUETIAPINE FUMARATE 12.5 MG: 25 TABLET ORAL at 22:01

## 2018-01-01 ASSESSMENT — PAIN SCALES - GENERAL
PAINLEVEL_OUTOF10: 0
PAINLEVEL_OUTOF10: 9
PAINLEVEL_OUTOF10: 0
PAINLEVEL_OUTOF10: 7
PAINLEVEL_OUTOF10: 0
PAINLEVEL_OUTOF10: 9
PAINLEVEL_OUTOF10: 4
PAINLEVEL_OUTOF10: 0
PAINLEVEL_OUTOF10: 5
PAINLEVEL_OUTOF10: 0
PAINLEVEL_OUTOF10: 0
PAINLEVEL_OUTOF10: 5
PAINLEVEL_OUTOF10: 0
PAINLEVEL_OUTOF10: 6
PAINLEVEL_OUTOF10: 0

## 2018-01-01 ASSESSMENT — PULMONARY FUNCTION TESTS
PIF_VALUE: 13
PIF_VALUE: 7
PIF_VALUE: 13
PIF_VALUE: 14
PIF_VALUE: 12
PIF_VALUE: 0
PIF_VALUE: 0
PIF_VALUE: 14
PIF_VALUE: 23
PIF_VALUE: 3
PIF_VALUE: 13
PIF_VALUE: 0
PIF_VALUE: 6
PIF_VALUE: 2
PIF_VALUE: 15
PIF_VALUE: 13
PIF_VALUE: 13
PIF_VALUE: 1
PIF_VALUE: 8
PIF_VALUE: 10
PIF_VALUE: 12
PIF_VALUE: 14
PIF_VALUE: 14
PIF_VALUE: 16
PIF_VALUE: 13
PIF_VALUE: 1
PIF_VALUE: 11
PIF_VALUE: 7
PIF_VALUE: 14
PIF_VALUE: 13
PIF_VALUE: 14
PIF_VALUE: 14
PIF_VALUE: 13
PIF_VALUE: 12
PIF_VALUE: 14
PIF_VALUE: 3
PIF_VALUE: 8
PIF_VALUE: 12
PIF_VALUE: 8
PIF_VALUE: 13
PIF_VALUE: 8
PIF_VALUE: 4
PIF_VALUE: 14
PIF_VALUE: 11
PIF_VALUE: 14
PIF_VALUE: 13
PIF_VALUE: 7
PIF_VALUE: 2
PIF_VALUE: 7
PIF_VALUE: 12
PIF_VALUE: 17
PIF_VALUE: 12
PIF_VALUE: 12
PIF_VALUE: 4
PIF_VALUE: 8
PIF_VALUE: 14
PIF_VALUE: 11
PIF_VALUE: 8
PIF_VALUE: 16
PIF_VALUE: 13
PIF_VALUE: 8
PIF_VALUE: 14
PIF_VALUE: 1
PIF_VALUE: 8
PIF_VALUE: 5
PIF_VALUE: 17
PIF_VALUE: 13
PIF_VALUE: 3
PIF_VALUE: 14
PIF_VALUE: 12
PIF_VALUE: 14
PIF_VALUE: 5
PIF_VALUE: 15
PIF_VALUE: 14
PIF_VALUE: 13
PIF_VALUE: 9
PIF_VALUE: 15
PIF_VALUE: 2
PIF_VALUE: 17
PIF_VALUE: 3
PIF_VALUE: 16
PIF_VALUE: 14
PIF_VALUE: 9
PIF_VALUE: 14
PIF_VALUE: 11
PIF_VALUE: 16
PIF_VALUE: 11
PIF_VALUE: 14
PIF_VALUE: 0
PIF_VALUE: 2
PIF_VALUE: 13
PIF_VALUE: 8
PIF_VALUE: 8
PIF_VALUE: 0
PIF_VALUE: 0
PIF_VALUE: 12
PIF_VALUE: 13
PIF_VALUE: 13
PIF_VALUE: 7
PIF_VALUE: 7
PIF_VALUE: 14
PIF_VALUE: 12
PIF_VALUE: 11
PIF_VALUE: 8
PIF_VALUE: 14
PIF_VALUE: 1
PIF_VALUE: 13
PIF_VALUE: 14
PIF_VALUE: 4
PIF_VALUE: 13
PIF_VALUE: 12
PIF_VALUE: 12
PIF_VALUE: 13
PIF_VALUE: 13
PIF_VALUE: 2
PIF_VALUE: 12
PIF_VALUE: 4
PIF_VALUE: 11
PIF_VALUE: 8
PIF_VALUE: 13
PIF_VALUE: 17
PIF_VALUE: 13
PIF_VALUE: 8

## 2018-01-01 ASSESSMENT — PAIN SCALES - WONG BAKER
WONGBAKER_NUMERICALRESPONSE: 0
WONGBAKER_NUMERICALRESPONSE: 0

## 2018-01-01 ASSESSMENT — ENCOUNTER SYMPTOMS
EYE PAIN: 0
EYE REDNESS: 0
RHINORRHEA: 0
SHORTNESS OF BREATH: 0
DIARRHEA: 0
SHORTNESS OF BREATH: 0
CHEST TIGHTNESS: 0
DIARRHEA: 0
SHORTNESS OF BREATH: 1
DIARRHEA: 1
ABDOMINAL PAIN: 0
NAUSEA: 0
NAUSEA: 0
SHORTNESS OF BREATH: 0
WHEEZING: 1
SHORTNESS OF BREATH: 0
SHORTNESS OF BREATH: 1
ABDOMINAL PAIN: 1
NAUSEA: 0
SHORTNESS OF BREATH: 0
NAUSEA: 0
SHORTNESS OF BREATH: 1
COUGH: 1
VOMITING: 0
NAUSEA: 0
CONSTIPATION: 0
BACK PAIN: 0
COUGH: 1
ABDOMINAL PAIN: 0
NAUSEA: 0
COUGH: 1
FACIAL SWELLING: 0
NAUSEA: 0
CHEST TIGHTNESS: 0
NAUSEA: 0
SORE THROAT: 0
COUGH: 1
NAUSEA: 0

## 2018-01-01 ASSESSMENT — PAIN DESCRIPTION - PAIN TYPE
TYPE: CHRONIC PAIN
TYPE: ACUTE PAIN

## 2018-01-01 ASSESSMENT — PAIN DESCRIPTION - LOCATION
LOCATION: GENERALIZED
LOCATION: FOOT
LOCATION: FOOT
LOCATION: ABDOMEN
LOCATION: GENERALIZED

## 2018-01-01 ASSESSMENT — PAIN DESCRIPTION - PROGRESSION: CLINICAL_PROGRESSION: GRADUALLY WORSENING

## 2018-01-01 ASSESSMENT — PAIN DESCRIPTION - ORIENTATION
ORIENTATION: LEFT;RIGHT
ORIENTATION: RIGHT;LEFT
ORIENTATION: MID

## 2018-01-01 ASSESSMENT — PAIN - FUNCTIONAL ASSESSMENT
PAIN_FUNCTIONAL_ASSESSMENT: 0-10

## 2018-01-01 ASSESSMENT — PAIN DESCRIPTION - FREQUENCY
FREQUENCY: INTERMITTENT
FREQUENCY: CONTINUOUS

## 2018-01-01 ASSESSMENT — PAIN DESCRIPTION - DESCRIPTORS
DESCRIPTORS: CONSTANT
DESCRIPTORS: ACHING;DISCOMFORT
DESCRIPTORS: PRESSURE

## 2018-01-01 ASSESSMENT — PAIN DESCRIPTION - ONSET: ONSET: AWAKENED FROM SLEEP

## 2018-02-17 PROBLEM — N17.9 ACUTE RENAL FAILURE SUPERIMPOSED ON CHRONIC KIDNEY DISEASE (HCC): Status: ACTIVE | Noted: 2018-01-01

## 2018-02-17 PROBLEM — N18.9 ACUTE RENAL FAILURE SUPERIMPOSED ON CHRONIC KIDNEY DISEASE (HCC): Status: ACTIVE | Noted: 2018-01-01

## 2018-02-17 PROBLEM — R06.03 ACUTE RESPIRATORY DISTRESS: Status: ACTIVE | Noted: 2018-01-01

## 2018-02-17 PROBLEM — I50.23 ACUTE ON CHRONIC SYSTOLIC CHF (CONGESTIVE HEART FAILURE) (HCC): Status: ACTIVE | Noted: 2018-01-01

## 2018-02-17 PROBLEM — I50.43 CHF (CONGESTIVE HEART FAILURE), NYHA CLASS I, ACUTE ON CHRONIC, COMBINED (HCC): Status: ACTIVE | Noted: 2018-01-01

## 2018-02-19 PROBLEM — E11.621 DIABETIC ULCER OF LEFT FOOT ASSOCIATED WITH TYPE 2 DIABETES MELLITUS (HCC): Status: ACTIVE | Noted: 2018-01-01

## 2018-02-19 PROBLEM — L97.529 DIABETIC ULCER OF LEFT FOOT ASSOCIATED WITH TYPE 2 DIABETES MELLITUS (HCC): Status: ACTIVE | Noted: 2018-01-01

## 2018-02-23 NOTE — TELEPHONE ENCOUNTER
[address]    SYMPTOM ASSESSMENT: Post discharge follow-up phone call made to patient; spoke with patient's wife. Patient's wife states patient denies shortness of breath, chest pain, edema, or difficulty sleeping; stated he has been \"doing really well\" since discharge. Discharge weight was 168 lbs; today's weight was 164 lbs on patient's home scale. MEDICATION REVIEW: Patient's wife picked up all medications on their way home last night and states her  has been taking medications as prescribed. Reviewed patient medications; no discrepancies identified. FOLLOW-UP APPOINTMENT: Reminded patient's of her 's appointment with Dr. Ana Carrizales scheduled for 3/1 at 12:30 PM; also reminded her of appointment with Avis Campbell MD for 2/26 at 12:30 PM; states she might push the Dr. Shelley Turcios patient out a few weeks. Patient has arranged transportation to scheduled appointment with his wife. Patient's wife states she has not yet heard from [facility]. EDUCATION: Educated patient's wife on sodium restriction of < 3,000 mg and fluid restriction of 48-64 oz, daily weights, follow-up, and medication compliance. Reviewed recommended level of activity. Notified patient's wife to call the doctor post discharge if her  experiences shortness of breath, chest pain, swelling, cough, or weight gain or loss of 2-3 pounds in a day/five pounds in a week. Also notified patient's wife to call the doctor if her  feels dizzy, increased fatigue, decreased or difficulty urinating. Pt verbalized understanding; stated she will call the doctor with any questions or signs of symptom worsening. No additional questions at this time. HF resource number made available for non-urgent questions.     -----------------------    Spoke with [facility] liaison ALEXEI Rodarte who is checking with home care office staff regarding scheduling start of care.

## 2018-03-21 NOTE — TELEPHONE ENCOUNTER
Coumadin/PT/INR    PT:     INR: 2.2    Where do you have your blood drawn? [facility]  (lab/HHRN/Home Monitor)    When was it drawn? TODAY    Why do you take Coumadin/Warfarin? Current medication dosage and instructions? PER NURSE 2 MG DAILY    Have you missed any doses? N    Any change in your other medications? N    Are you taking an antibiotic? N    Any dietary changes? Kathe Collins

## 2018-03-26 PROBLEM — I50.40 COMBINED CONGESTIVE SYSTOLIC AND DIASTOLIC HEART FAILURE (HCC): Status: ACTIVE | Noted: 2018-01-01

## 2018-04-04 PROBLEM — N17.9 ACUTE RENAL FAILURE SUPERIMPOSED ON CHRONIC KIDNEY DISEASE (HCC): Status: ACTIVE | Noted: 2018-01-01

## 2018-04-04 PROBLEM — R94.30 CARDIAC LEFT VENTRICULAR EJECTION FRACTION 30-35 PERCENT: Status: ACTIVE | Noted: 2018-01-01

## 2018-04-04 PROBLEM — C34.91 SQUAMOUS CELL CARCINOMA LUNG, RIGHT (HCC): Status: ACTIVE | Noted: 2018-01-01

## 2018-04-04 PROBLEM — N18.9 ACUTE RENAL FAILURE SUPERIMPOSED ON CHRONIC KIDNEY DISEASE (HCC): Status: ACTIVE | Noted: 2018-01-01

## 2018-04-04 PROBLEM — N18.6 ESRD (END STAGE RENAL DISEASE) (HCC): Status: ACTIVE | Noted: 2018-01-01

## 2018-05-21 PROBLEM — E44.0 MODERATE MALNUTRITION (HCC): Chronic | Status: ACTIVE | Noted: 2018-01-01

## 2018-05-29 NOTE — TELEPHONE ENCOUNTER
Patients spouse cancelled appointment on 5/31/18 with  for 4/ week fua. Reason: Patient in rehab    Patient did reschedule appointment. Appointment rescheduled for 9/26/18. Last OV   Assessment: 4/26/18      · Right upper lobe 2.8×2.1 cm non-small cell lung cancer diagnosed 3/30/18, suspect [id number] (IA3)   · Moderate right pleural effusion- favor CHF related.   · CardiomyopathyEF 20%  · CKD stage V  · CAD post CABG and aortic valve replacement  · A. fib on Coumadin  · COPD  ·  80 pack year smoking- quit 2008       Plan:       · PFTs and 6MW  · Right-sided thoracentesis rule out malignant effusion-will need to be off Coumadin for 5 days  · Refer to Dr. Ihsan Longo for SBRT evaluation  · Trial of Spiriva Respimat: Two inhalations (5 mcg) once daily (maximum: 2 inhalations per 24 hours)  · Xopenex Q4 hrs PRN

## 2018-07-02 NOTE — TELEPHONE ENCOUNTER
CXR has not been completed yet. SW wife. She said they plan on getting CXR tomorrow. Will watch for results.

## 2018-07-05 NOTE — TELEPHONE ENCOUNTER
Needs right-sided thoracentesis as recommended  Needs to be off Coumadin if okay with prescriber for 5 days before thoracentesis

## 2018-07-05 NOTE — TELEPHONE ENCOUNTER
CXR completed yesterday, 7/4/18. Please review. .        Impression   Stable interstitial opacities compatible with interstitial edema.  Known   right upper lobe mass re- demonstrated

## 2018-07-05 NOTE — TELEPHONE ENCOUNTER
Pt wife was informed. Need to call Dr. Hasmukh Rashid office for pt to be approved to be off Coumadin prior to procedure then schedule with pt wife. Pt starts cancer treatments next week and will have a very busy schedule.

## 2018-07-06 NOTE — TELEPHONE ENCOUNTER
Merissa Pérez from 's office called stating Dr. Dl Mehta would like to schedule a Thoracentesis, but will need pt to be off coumadin for 5 days. They are requesting a verbal call back to the office with RKG's response. Please call [phone].

## 2018-07-09 NOTE — TELEPHONE ENCOUNTER
Yes may hold the warfarin for 5 days before the thoracentesis and restart   24 hrs after the procedure.

## 2018-07-10 NOTE — TELEPHONE ENCOUNTER
Patient is scheduled for thoracentesis 7/27/18 at 12 PM arrival at 10:45 AM.   Order pending please review and sign. Patient's spouse aware of date/time/instructions, to hold Coumadin 5 days prior with verbal understanding. Spouse/patient to contact office with any questions/concerns. Patient has follow up 9/26/18 with . Please look for results.

## 2018-07-31 NOTE — ED PROVIDER NOTES
AORTIC VALVE REPLACEMENT  4/14/08    bioprosthesis      CATARACT REMOVAL      left    CATARACT REMOVAL Bilateral     COLONOSCOPY  2/19/13    RECTAL, SIGMOID AND TRANSVERSE POLYPS    CORONARY ARTERY BYPASS GRAFT      5 vessel    HERNIA REPAIR      MOHS SURGERY      OTHER SURGICAL HISTORY  04/27/2018    laparoscopic repositioning of peritoneal dialysis catheter    SPINE SURGERY       Social History     Social History    Marital status:      Spouse name: N/A    Number of children: N/A    Years of education: N/A     Social History Main Topics    Smoking status: Former Smoker     Packs/day: 2.00     Years: 46.00     Types: Cigarettes     Quit date: 4/1/2008    Smokeless tobacco: Never Used    Alcohol use No    Drug use: No    Sexual activity: Yes     Partners: Female     Other Topics Concern    None     Social History Narrative    None     Family History   Problem Relation Age of Onset    Hypertension Mother     Emphysema Mother     Heart Disease Mother     High Blood Pressure Mother     Cancer Father     Emphysema Sister     Cancer Sister     Hypertension Daughter     Hypertension Son     Diabetes Neg Hx     Asthma Neg Hx     Heart Failure Neg Hx        Medications/Allergies     Previous Medications    ALBUTEROL (PROVENTIL) (2.5 MG/3ML) 0.083% NEBULIZER SOLUTION    Take 3 mLs by nebulization every 4 hours (while awake)    AMIODARONE (PACERONE) 100 MG TABLET    Take 1 tablet by mouth daily    BISACODYL (DULCOLAX) 5 MG EC TABLET    Take 5 mg by mouth daily as needed for Constipation    DOCUSATE SODIUM (COLACE, DULCOLAX) 100 MG CAPS    Take 100 mg by mouth daily    DORZOLAMIDE (TRUSOPT) 2 % OPHTHALMIC SOLUTION    Place 1 drop into the left eye 2 times daily.     FAMOTIDINE (PEPCID) 20 MG TABLET    Take 1 tablet by mouth daily    FAMOTIDINE (PEPCID) 20 MG TABLET    Take 20 mg by mouth 2 times daily    INSULIN ASPART (NOVOLOG) 100 UNIT/ML INJECTION VIAL    Inject 1 Units into the skin 3 Troponin 0.15 (H) <0.01 ng/mL   Urinalysis Reflex to Culture   Result Value Ref Range    Color, UA Yellow Straw/Yellow    Clarity, UA SL CLOUDY (A) Clear    Glucose, Ur 100 (A) Negative mg/dL    Bilirubin Urine Negative Negative    Ketones, Urine TRACE (A) Negative mg/dL    Specific Gravity, UA 1.025 1.005 - 1.030    Blood, Urine LARGE (A) Negative    pH, UA 6.0 5.0 - 8.0    Protein,  (A) Negative mg/dL    Urobilinogen, Urine 2.0 (A) <2.0 E.U./dL    Nitrite, Urine Negative Negative    Leukocyte Esterase, Urine LARGE (A) Negative    Microscopic Examination YES     Urine Reflex to Culture Yes     Urine Type Not Specified    Microscopic Urinalysis   Result Value Ref Range    WBC, UA >100 (A) 0 - 5 /HPF    RBC, UA 5-10 (A) 0 - 2 /HPF    Epi Cells 0-2 /HPF    Bacteria, UA 3+ (A) /HPF   EKG 12 Lead   Result Value Ref Range    Ventricular Rate 83 BPM    Atrial Rate 35 BPM    QRS Duration 198 ms    Q-T Interval 478 ms    QTc Calculation (Bazett) 561 ms    R Axis -62 degrees    T Axis 72 degrees    Diagnosis       Atrial fibrillationLeft axis deviationNon-specific intra-ventricular conduction blockPossible Lateral infarct , age undeterminedAbnormal ECGNo previous ECGs available     Radiographs:  Xr Chest Standard (2 Vw)    Result Date: 7/31/2018  EXAMINATION: TWO VIEWS OF THE CHEST 7/31/2018 4:25 am COMPARISON: 07/30/2018 HISTORY: ORDERING SYSTEM PROVIDED HISTORY: pain TECHNOLOGIST PROVIDED HISTORY: Reason for exam:->pain Ordering Physician Provided Reason for Exam: abd pain cough Acuity: Acute Type of Exam: Initial FINDINGS: Cardiomegaly and multifocal pulmonary edema are identified, mildly exacerbated compared to prior of 07/03/2018. Prosthetic aortic valve is present. Central venous catheter terminates in the right atrium. There is moderate osteopenia. Small effusions considered. Cardiomegaly and moderate vascular congestion, increased compared to 07/03/2018.      Ct Abdomen Pelvis W Iv Contrast Additional disease. 4. Mild left adrenal gland thickening. 5. 11 mm left adrenal myelolipoma. 6. Urinary bladder wall thickening. Procedures/EKG:   Procedures  EKG as interpreted by me shows atrial fibrillation with rate of 83. Left axis deviation. Nonspecific intraventricular block. Wide QRS. No significant change when compared to previous on 5/25/18. ED Course and MDM           MDM  Number of Diagnoses or Management Options  Acute cystitis without hematuria: new and requires workup     Amount and/or Complexity of Data Reviewed  Clinical lab tests: ordered and reviewed  Tests in the radiology section of CPT®: ordered and reviewed  Independent visualization of images, tracings, or specimens: yes    Risk of Complications, Morbidity, and/or Mortality  Presenting problems: high  Diagnostic procedures: moderate  Management options: low        In brief, Aries Claros is a 65 y.o. male who presented to the emergency department Complaints of abdominal pain since yesterday evening. Mild, generalized tenderness on exam.  Differential includes but is not limited to diverticulitis, colitis, otitis, UTI, pyelonephritis.  0605 discussed with patient diagnosis of UTI. Discussed admission versus discharge. Patient would like to go home. Does not have any right upper quadrant tenderness. I do not feel that he has cholecystitis. Patient also with mildly elevated troponin, but this is chronic. Discussed return precautions and need for f/u. Verbalized understanding of diagnosis and discharge plan.       ED Medication Orders     Start Ordered     Status Ordering Provider    07/31/18 0615 07/31/18 0608  ondansetron (ZOFRAN) injection 4 mg  ONCE      Ordered LECOMTE, CAITLIN A    07/31/18 0600 07/31/18 0554  cefTRIAXone (ROCEPHIN) 1 g IVPB in 50 mL D5W minibag  ONCE      Last MAR action:  New [facility]        Vitals:    07/31/18 0423 07/31/18 0425 07/31/18 0509   BP: 138/82 138/82 113/88   Pulse:  81    Resp:  18    Temp:  98 °F (36.7 °C)    TempSrc:  Oral    SpO2: 96% 97%    Weight:  177 lb (80.3 kg)    Height:  5' 7\" (1.702 m)          DISPOSITION Decision To Discharge 07/31/2018 06:06:47 AM       PATIENT REFERRED TO:  Candace Luciano MD  [facility] [id number]  [phone]    Schedule an appointment as soon as possible for a visit         DISCHARGE MEDICATIONS:  New Prescriptions    CEPHALEXIN (KEFLEX) 500 MG CAPSULE    Take 1 capsule by mouth 2 times daily for 7 days       Final Impression      1.  Acute cystitis without hematuria        (Please note that portions of this note may have been completed with a voice recognition program. Efforts were made to edit the dictations but occasionally words are mis-transcribed.)    Arline Bertrand, [address] [place] [address],   07/31/18 3459

## 2018-08-03 NOTE — TELEPHONE ENCOUNTER
CALLED AND SPOKE WITH SPOUSE TO MAKE SURE PATIENT DOES NOT TAKE ANY MORE COUMADIN AFTER TOMORROW AS NEEDS TO HOLD IT FOR HIS SURGERY ON 8/9/18. ALICE

## 2018-08-03 NOTE — PROGRESS NOTES
Outpatient Consultation / H&P    Date of Consultation:  8/3/2018    PCP:  Dulce Maria Pastrana MD     Referring Provider:  Dr. Quinn Guallpa     Chief Complaint:   Chief Complaint   Patient presents with    Other     patient is here to discuss dialysis access. ref by Dr Quinn Guallpa. History of Present Illness: We are asked to see this patient in consultation by Dr. Quinn Guallpa regarding dialysis access. Urmila Louis is a 65 y.o. male who has ESRD on HD via Permcath. He was previously on PD but this was ineffective. Patient does not ambulate. R Hand dominant. Vein mapping performed at access center.       Past Medical History:  Past Medical History:   Diagnosis Date    Acute non Q wave MI (myocardial infarction), initial episode of care ([facility] [address] 04/01/08    Anemia of chronic disease     Blood transfusion     CAD (coronary artery disease)     Carotid occlusion, right     CHF (congestive heart failure) (HCC)     Chronic atrial fibrillation (HCC)     CKD (chronic kidney disease)     Closed fracture of proximal end of right fibula 6/3/2016    COPD exacerbation ([address] 4/21/2013    Diabetes mellitus ([address]     ESRD (end stage renal disease) ([address]     Glaucoma 2012    Eye surgery this 1 10 2012 and 1 17 2012    Hyperlipidemia     Hypertension     Lung disease     COPD    Mitral regurgitation     mild - moderate    Pneumonia 2009    Pulmonary nodule     Skin cancer     nose    Squamous cell carcinoma lung, right ([address] 4/4/2018    Thyroid disease        Past Surgical History:  Past Surgical History:   Procedure Laterality Date    ABDOMEN SURGERY  04/05/2018    peritoneal dialysis catheter inserted    AORTIC VALVE REPLACEMENT  4/14/08    bioprosthesis      CATARACT REMOVAL      left    CATARACT REMOVAL Bilateral     COLONOSCOPY  2/19/13    RECTAL, SIGMOID AND TRANSVERSE POLYPS    CORONARY ARTERY BYPASS GRAFT      5 vessel    HERNIA REPAIR      MOHS SURGERY      OTHER SURGICAL HISTORY 04/27/2018    laparoscopic repositioning of peritoneal dialysis catheter    SPINE SURGERY         Home Medications:   Prior to Admission medications    Medication Sig Start Date End Date Taking?  Authorizing Provider   pantoprazole (PROTONIX) 20 MG tablet Take 20 mg by mouth daily   Yes Historical Provider, MD   polyethylene glycol (MIRALAX) packet Take 17 g by mouth daily as needed for Constipation 7/11/18 8/10/18 Yes Keke Montiel PA-C   bisacodyl (DULCOLAX) 5 MG EC tablet Take 5 mg by mouth daily as needed for Constipation   Yes Historical Provider, MD   famotidine (PEPCID) 20 MG tablet Take 20 mg by mouth 2 times daily   Yes Historical Provider, MD   insulin aspart (NOVOLOG) 100 UNIT/ML injection vial Inject 1 Units into the skin 3 times daily (before meals) 0   -  150   =  0 units  151 -  200  =  2 units  201 - 250  =  4 units  251 - 300  =  6 units  301 - 350  = 8 units   Yes Historical Provider, MD   senna (SENOKOT) 8.6 MG tablet Take 1 tablet by mouth daily   Yes Historical Provider, MD   docusate sodium (COLACE, DULCOLAX) 100 MG CAPS Take 100 mg by mouth daily 5/22/18  Yes Jose Shore MD   albuterol (PROVENTIL) (2.5 MG/3ML) 0.083% nebulizer solution Take 3 mLs by nebulization every 4 hours (while awake) 5/21/18  Yes Jose Shore MD   isosorbide dinitrate (ISORDIL) 10 MG tablet Take 1 tablet by mouth 2 times daily  Patient taking differently: Take 20 mg by mouth 2 times daily  2/22/18  Yes Michael Cline MD   amiodarone (PACERONE) 100 MG tablet Take 1 tablet by mouth daily  Patient taking differently: Take 200 mg by mouth daily  2/23/18  Yes Michael Cline MD   insulin lispro (HUMALOG) 100 UNIT/ML injection vial Inject 0-6 Units into the skin 3 times daily (with meals) 2/22/18  Yes Michael Cline MD   torsemide [facility]) 20 MG tablet Take 1 tablet by mouth daily 2/23/18  Yes Michael Cline MD   famotidine (PEPCID) 20 MG tablet Take 1 tablet by mouth daily 2/23/18  Yes Jorge Villegas BMI 27.41 kg/m²     Weight: 175 lb (79.4 kg)       General appearance: NAD  Eyes: PERRLA  Neck: no JVD, no lymphadenopathy. Respiratory: effort is unlabored, no crackles, wheezes or rubs. Cardiovascular: regular, no murmur. No carotid bruits. No edema or varicosities. Pulses:    brachial   RIGHT 2   LEFT 2   GI: abdomen soft, nondistended, no organomegaly. Musculoskeletal: strength and tone normal.  Extremities: warm and pink. Skin: no dermatitis or ulceration. Neuro/psychiatric: grossly intact. MEDICAL DECISION MAKING/TESTING      Vein mapping:  Small radial and ulnar arteries. Small veins. Assessment:      Diagnosis Orders   1. Encounter regarding vascular access for dialysis for ESRD [facility])           Recommendations/Plan:    Left Upper arm Brachial artery to distal Basilic Vein AV Graft. Procedure, risks, benefits, and alternatives discussed and understood.        Jaycee Pena MD, FACS

## 2018-08-09 PROBLEM — N39.0 UTI (URINARY TRACT INFECTION): Status: ACTIVE | Noted: 2018-01-01

## 2018-08-09 NOTE — PROGRESS NOTES
4 Eyes Skin Assessment     The patient is being assess for   Admission    I agree that 2 RN's have performed a thorough Head to Toe Skin Assessment on the patient. ALL assessment sites listed below have been assessed. Areas assessed by both nurses: Ernesto Burciaga RN and Veronica RN  [x]   Head, Face, and Ears   [x]   Shoulders, Back, and Chest, Abdomen  [x]   Arms, Elbows, and Hands   [x]   Coccyx, Sacrum, and Ischium  [x]   Legs, Feet, and Heels        Stage 2 to coccyx with blanching redness surrounding. Unstageable wounds to bilateral heels with eschar on each. Scattered scabbing to bilateral legs and bruising. Diabetic ulcer to right lateral foot. **SHARE this note so that the co-signing nurse is able to place an eSignature**    Co-signer eSignature: Electronically signed by Ino Galvez RN on 8/9/18 at 7:36 PM    Does the Patient have Skin Breakdown?   Yes LDA WOUND CARE was Initiated documentation include the Esperanza-wound, Wound Assessment, Measurements, Dressing Treatment, Drainage, and Color\",          Jono Prevention initiated:  Yes   Wound Care Orders initiated:  Yes      [address]  nurse consulted for Pressure Injury (Stage 3,4, Unstageable, DTI, NWPT, Complex wounds)and New or Established Ostomies:  Yes      Primary Nurse eSignature: Electronically signed by Mary Alice Casas RN on 8/9/18 at 7:35 PM

## 2018-08-09 NOTE — PROGRESS NOTES
Pharmacy to Dose Warfarin    Dx: Afib. H/O Aortic valve replacement  Goal INR range 2-3 or 2.5-3.5  Home Warfarin dose:2mg daily  New start Warfarin with Heparin/Enoxaparin bridge. Would recommend continue bridge until INR therapeutic. Date  INR  Warfarin  8/9                  1.39                 5mg  Recommend Warfarin 5mg tonight x1. Daily INR ordered. Rx will continue to manage therapy per consult order.   Vince Mendez Pharm D 3/4/33780:20 PM  .

## 2018-08-09 NOTE — PROGRESS NOTES
RESPIRATORY THERAPY ASSESSMENT    Name:  [address] Record Number:  [id number]  Age: 65 y.o. Gender: male  : 1940  Today's Date:  2018  Room:  [address] [place] [address]    Assessment     Is the patient being admitted for a COPD or Asthma exacerbation? No   (If yes the patient will be seen every 4 hours for the first 24 hours and then reassessed)    Patient Admission Diagnosis      Allergies  No Known Allergies    Minimum Predicted Vital Capacity:     925          Actual Vital Capacity:      750          Pulmonary History: COPD, pulm nodule, Right lung squamous cell CA  Home Oxygen Therapy:  room air  Home Respiratory Therapy: 2 puffs Xopenex QID, Albuterol HHN BID per pt. Current Respiratory Therapy:  Albuterol Q4H w/a  Treatment Type: HHN  Medications: Albuterol    Respiratory Severity Index(RSI)   Patients with orders for inhalation medications, oxygen, or any therapeutic treatment modality will be placed on Respiratory Protocol. They will be assessed with the first treatment and at least every 72 hours thereafter. The following severity scale will be used to determine frequency of treatment intervention.     Smoking History: Pulmonary Disease or Smoking History, Greater than 15 pack year = 2    Social History  Social History   Substance Use Topics    Smoking status: Former Smoker     Packs/day: 2.00     Years: 46.00     Types: Cigarettes     Quit date: 2008    Smokeless tobacco: Never Used    Alcohol use No       Recent Surgical History: None = 0  Past Surgical History  Past Surgical History:   Procedure Laterality Date    ABDOMEN SURGERY  2018    peritoneal dialysis catheter inserted    AORTIC VALVE REPLACEMENT  08    bioprosthesis      CATARACT REMOVAL      left    CATARACT REMOVAL Bilateral     COLONOSCOPY  13    RECTAL, SIGMOID AND TRANSVERSE POLYPS    CORONARY ARTERY BYPASS GRAFT      5 vessel    HERNIA REPAIR      MOHS SURGERY      OTHER SURGICAL HISTORY 04/27/2018    laparoscopic repositioning of peritoneal dialysis catheter    SPINE SURGERY         Level of Consciousness: Alert, Oriented, and Cooperative = 0    Level of Activity: Non weight bearing- transfers bed to chair only = 3    Respiratory Pattern: Regular Pattern; RR 8-20 = 0    Breath Sounds: Diminshed bilaterally and/or crackles = 2    Sputum   ,  ,    Cough: Strong, spontaneous, non-productive = 0    Vital Signs   /64   Pulse 83   Temp 97 °F (36.1 °C) (Oral)   Resp 16   Ht 5' 5\" (1.651 m)   Wt 170 lb (77.1 kg)   SpO2 94%   BMI 28.29 kg/m²   SPO2 (COPD values may differ): Greater than or equal to 92% on room air = 0    Peak Flow (asthma only): not applicable = 0    RSI: 7-8 = BID and Q4HPRN (every four hours as needed) for dyspnea        Plan       Goals: medication delivery    Patient/caregiver was educated on the proper method of use for Respiratory Care Devices:  Yes      Level of patient/caregiver understanding able to:   [x] Verbalize understanding   [x] Demonstrate understanding       [] Teach back        [] Needs reinforcement       []  No available caregiver               []  Other:     Response to education:  Excellent     Is patient being placed on Home Treatment Regimen? Yes     Does the patient have everything they need prior to discharge? Yes     Comments: pt assessed, interviewed/chart reviewed    Plan of Care: Albuterol QID as pt's home regimen    Electronically signed by Chandana Jaramillo RCP on 8/9/2018 at 4:04 PM    Respiratory Protocol Guidelines     1. Assessment and treatment by Respiratory Therapy will be initiated for medication and therapeutic interventions upon initiation of aerosolized medication. 2. Physician will be contacted for respiratory rate (RR) greater than 35 breaths per minute. Therapy will be held for heart rate (HR) greater than 140 beats per minute, pending direction from physician.   3. Bronchodilators will be administered via Metered Dose Inhaler (MDI) with spacer when the following criteria are met:  a. Alert and cooperative     b. HR < 140 bpm  c. RR < 30 bpm                d. Can demonstrate a 23 second inspiratory hold  4. Bronchodilators will be administered via Hand Held Nebulizer SAMIR [facility]) to patients when ANY of the following criteria are met  a. Incognizant or uncooperative          b. Patients treated with HHN at Home        c. Unable to demonstrate proper use of MDI with spacer     d. RR > 30 bpm   5. Bronchodilators will be delivered via Metered Dose Inhaler (MDI), HHN, Aerogen to intubated patients on mechanical ventilation. 6. Inhalation medication orders will be delivered and/or substituted as outlined below. Aerosolized Medications Ordering and Administration Guidelines:    1. All Medications will be ordered by a physician, and their frequency and/or modality will be adjusted as defined by the patients Respiratory Severity Index (RSI) score. 2. If the patient does not have documented COPD, consider discontinuing anticholinergics when RSI is less than 9.  3. If the bronchospasm worsens (increased RSI), then the bronchodilator frequency can be increased to a maximum of every 4 hours. If greater than every 4 hours is required, the physician will be contacted. 4. If the bronchospasm improves, the frequency of the bronchodilator can be decreased, based on the patient's RSI, but not less than home treatment regimen frequency. 5. Bronchodilator(s) will be discontinued if patient has a RSI less than 9 and has received no scheduled or as needed treatment for 72  Hrs. Patients Ordered on a Mucolytic Agent:    1. Must always be administered with a bronchodilator. 2. Discontinue if patient experiences worsened bronchospasm, or secretions have lessened to the point that the patient is able to clear them with a cough. Anti-inflammatory and Combination Medications:    1.  If the patient lacks prior history of lung disease, is not using inhaled anti-inflammatory medication at home, and lacks wheezing by examination or by history for at least 24 hours, contact physician for possible discontinuation.

## 2018-08-09 NOTE — H&P
Admission [address];  MRN: [id number] ;   Admit Date: 8/9/2018  9:39 AM   Current Date and Time: 8/9/2018 1:36 PM    PCP  --  Houston Hope MD         Chief Complaint   Patient presents with    Urinary Tract Infection     pt contacted by ER 4 days ago  to return to ED for change in atbs being used to treat UTI         HPI:  Patient is a 65 y.o. who was diagnosed with uti few days ago and rx ed with keflex for 7 days now was asked to return to hospital due to urine culture from 7/31 was + ve for pseudomonas ans and E. Fecalis the patient remains afebrile and denies any pain he denies N/V/D   He is on H.D. 3 x week . urine culture pending .     No Known Allergies    Past Medical History:   Diagnosis Date    Acute non Q wave MI (myocardial infarction), initial episode of care ([facility] 04/01/08    Anemia of chronic disease     Blood transfusion     CAD (coronary artery disease)     Carotid occlusion, right     CHF (congestive heart failure) (HCC)     Chronic atrial fibrillation (HCC)     CKD (chronic kidney disease)     Closed fracture of proximal end of right fibula 6/3/2016    COPD exacerbation ([facility] 4/21/2013    Diabetes mellitus ([facility]     ESRD (end stage renal disease) ([facility]     Glaucoma 2012    Eye surgery this 1 10 2012 and 1 17 2012    Hyperlipidemia     Hypertension     Lung disease     COPD    Mitral regurgitation     mild - moderate    Pneumonia 2009    Pulmonary nodule     Skin cancer     nose    Squamous cell carcinoma lung, right ([address] 4/4/2018    Thyroid disease       Past Surgical History:   Procedure Laterality Date    ABDOMEN SURGERY  04/05/2018    peritoneal dialysis catheter inserted    AORTIC VALVE REPLACEMENT  4/14/08    bioprosthesis      CATARACT REMOVAL      left    CATARACT REMOVAL Bilateral     COLONOSCOPY  2/19/13    RECTAL, SIGMOID AND TRANSVERSE POLYPS    CORONARY ARTERY BYPASS GRAFT      5 vessel    HERNIA REPAIR      MOHS SURGERY      OTHER

## 2018-08-09 NOTE — PROGRESS NOTES
Consult has been called to Dr. Pati Phoenix on 8/9/2018 . Spoke with Dorita Brunner at answering service.  3:26 PM    Christen Singh  8/9/2018

## 2018-08-09 NOTE — ED NOTES
8457- Call placed to Dr. Olivia Johnson cell phone for consult. Call transferred to Pavithra Beltran NP.       Sara Espinoza  08/09/18 0535

## 2018-08-09 NOTE — ED PROVIDER NOTES
[address] ED  eMERGENCY dEPARTMENT eNCOUnter        Pt Name: Elise Quintana  MRN: [id number]  Armsiangfmandie 1940  Date of evaluation: 8/9/2018  Provider: MICHAEL Day - GWENDOLYN  PCP: Javier Galvez MD  ED Attending: No att. providers found    [facility]       Chief Complaint   Patient presents with    Urinary Tract Infection     pt contacted by ER 4 days ago  to return to ED for change in atbs being used to treat UTI       HISTORY OF PRESENT ILLNESS   (Location/Symptom, Timing/Onset, Context/Setting, Quality, Duration, Modifying Factors, Severity)  Note limiting factors. Elise Quintana is a 65 y.o. male urinary tract infection. Onset was a few days ago. Duration has been since the onset. Context includes patient reports that he was seen on July 31 and diagnosed with UTI. He states he was discharged home with Keflex however he was called 4 days ago from the ED stating that he needed to return for IV antibiotics since the Keflex was not the appropriate drug sensitivities. Patient has a history of drug-resistant organisms. Alleviating factors include nothing. Aggravating factors include nothing. Pain is 0/10. Nothing has been used for pain today. Nursing Notes were all reviewed and agreed with or any disagreements were addressed  in the HPI. REVIEW OF SYSTEMS    (2-9 systems for level 4, 10 or more for level 5)     Review of Systems   Constitutional: Negative for fever. Respiratory: Negative for shortness of breath. Cardiovascular: Negative for chest pain. Gastrointestinal: Positive for diarrhea. Negative for abdominal pain. Genitourinary: Negative for difficulty urinating. All other systems reviewed and are negative. Positives and Pertinent negatives as per HPI. Except as noted above in the ROS, all other systems were reviewed and negative.        PAST MEDICAL HISTORY     Past Medical History:   Diagnosis Date    Acute non Q wave MI TABLET    Take 20 mg by mouth 2 times daily    INSULIN ASPART (NOVOLOG) 100 UNIT/ML INJECTION VIAL    Inject 1 Units into the skin 3 times daily (before meals) 0   -  150   =  0 units  151 -  200  =  2 units  201 - 250  =  4 units  251 - 300  =  6 units  301 - 350  = 8 units    INSULIN GLARGINE (LANTUS SC)    Inject 25 Units into the skin nightly 20 to 25 units depending on blood sugar result    INSULIN LISPRO (HUMALOG) 100 UNIT/ML INJECTION VIAL    Inject 0-6 Units into the skin 3 times daily (with meals)    ISOSORBIDE DINITRATE (ISORDIL) 10 MG TABLET    Take 1 tablet by mouth 2 times daily    LATANOPROST (XALATAN) 0.005 % OPHTHALMIC SOLUTION    Place 1 drop into both eyes nightly    LEVOTHYROXINE (SYNTHROID) 25 MCG TABLET    Take 25 mcg by mouth Daily    PANTOPRAZOLE (PROTONIX) 20 MG TABLET    Take 20 mg by mouth daily    POLYETHYLENE GLYCOL (MIRALAX) PACKET    Take 17 g by mouth daily as needed for Constipation    SENNA (SENOKOT) 8.6 MG TABLET    Take 1 tablet by mouth daily    TORSEMIDE (DEMADEX) 20 MG TABLET    Take 1 tablet by mouth daily    WARFARIN (COUMADIN) 2 MG TABLET    Take 1 tablet by mouth daily         ALLERGIES     Patient has no known allergies.     FAMILY HISTORY       Family History   Problem Relation Age of Onset    Hypertension Mother     Emphysema Mother     Heart Disease Mother     High Blood Pressure Mother     Cancer Father     Emphysema Sister     Cancer Sister     Hypertension Daughter     Hypertension Son     Diabetes Neg Hx     Asthma Neg Hx     Heart Failure Neg Hx           SOCIAL HISTORY       Social History     Social History    Marital status:      Spouse name: N/A    Number of children: N/A    Years of education: N/A     Social History Main Topics    Smoking status: Former Smoker     Packs/day: 2.00     Years: 46.00     Types: Cigarettes     Quit date: 4/1/2008    Smokeless tobacco: Never Used    Alcohol use No    Drug use: No    Sexual activity: Yes Partners: Female     Other Topics Concern    None     Social History Narrative    None       SCREENINGS    [place] Coma Scale  Eye Opening: Spontaneous  Best Verbal Response: Oriented  Best Motor Response: Obeys commands  [place] Coma Scale Score: 15        PHYSICAL EXAM    (up to 7 for level 4, 8 or more for level 5)     ED Triage Vitals [08/09/18 0943]   BP Temp Temp Source Pulse Resp SpO2 Height Weight   (!) 87/53 98.4 °F (36.9 °C) Oral 74 16 98 % 5' 5\" (1.651 m) 170 lb (77.1 kg)       Physical Exam   Constitutional: He is oriented to person, place, and time. Elderly gentleman   HENT:   Head: Normocephalic and atraumatic. Neck: Normal range of motion. Cardiovascular: Normal rate. Pulmonary/Chest: Effort normal. No respiratory distress. He has rales in the left middle field. Abdominal: Soft. He exhibits no distension. There is no tenderness. Musculoskeletal: Normal range of motion. Neurological: He is alert and oriented to person, place, and time. Skin: Skin is warm and dry. Sporadic abrasions and bruises noted to arms and legs   Psychiatric: He has a normal mood and affect.        DIAGNOSTIC RESULTS   LABS:    Labs Reviewed   COMPREHENSIVE METABOLIC PANEL - Abnormal; Notable for the following:        Result Value    Glucose 143 (*)     BUN 24 (*)     CREATININE 2.4 (*)     GFR Non- 26 (*)     GFR  32 (*)     Alb 3.2 (*)     Albumin/Globulin Ratio 0.9 (*)     Alkaline Phosphatase 169 (*)     All other components within normal limits    Narrative:     Performed at:  [facility],  [place], [facility]   Phone [phone]   CBC WITH AUTO DIFFERENTIAL - Abnormal; Notable for the following:     RBC 4.07 (*)     Hemoglobin 12.6 (*)     Hematocrit 38.4 (*)     RDW 18.2 (*)     Lymphocytes # 0.5 (*)     All other components within normal limits    Narrative:     Performed at:  [facility]  [facility],  [place], [email or web address]   Phone [phone]   URINALYSIS - Abnormal; Notable for the following:     Clarity, UA SL CLOUDY (*)     Ketones, Urine TRACE (*)     Blood, Urine SMALL (*)     Protein,  (*)     Leukocyte Esterase, Urine LARGE (*)     All other components within normal limits    Narrative:     Performed at:  [facility],  [place], [facility]   Phone [phone]   PROTIME-INR - Abnormal; Notable for the following:     Protime 15.8 (*)     INR 1.39 (*)     All other components within normal limits    Narrative:     Performed at:  [facility],  [place], [facility]   Phone [phone]   TROPONIN - Abnormal; Notable for the following:     Troponin 0.14 (*)     All other components within normal limits    Narrative:     Performed at:  [place] [address],  [place], [facility]   Phone [phone]   BRAIN NATRIURETIC PEPTIDE - Abnormal; Notable for the following:     Pro-BNP >70,000 (*)     All other components within normal limits    Narrative:     Performed at:  [facility],  [place], [email or web address]   Phone [phone]   MICROSCOPIC URINALYSIS - Abnormal; Notable for the following:     WBC, UA 10-20 (*)     Bacteria, UA 2+ (*)     All other components within normal limits    Narrative:     Performed at:  [facility],  [place], [employer]   Phone [phone]   CULTURE BLOOD #1   C DIFF TOXIN/ANTIGEN   CULTURE BLOOD #2   LACTIC ACID, PLASMA    Narrative:     Performed at:  [facility],  [place], [employer]   Phone [phone]       All other labs were within normal range or not returned as of this dictation. EKG:  All EKG's are interpreted by the Emergency Department

## 2018-08-09 NOTE — PROGRESS NOTES
Pt admitted to [place] [address] in stable condition. Heart monitor applied. Wife at bedside. VSS. Pt shows no s/s of distress at this time. Call light and fluids placed within reach. Oriented to room and call light . Will continue to monitor.

## 2018-08-09 NOTE — CARE COORDINATION
Case Management Assessment  Initial Evaluation    Date/Time of Evaluation: 8/9/2018 11:07 AM  Assessment Completed by: Tyler Edmondson    Patient Name: Lizz Morelos  YOB: 1940  Diagnosis: No admission diagnoses are documented for this encounter. Date / Time: 8/9/2018  9:39 AM  Admission status/Date: ED  Chart Reviewed: Yes      Patient Interviewed: Yes   Family Interviewed:  No      Hospitalization in the last 30 days:  No    Contacts  :     Relationship to Patient:   Phone Number:    Alternate Contact:     Relationship to Patient:     Phone Number:      Current PCP- Emma Lloyd MD    [address] Medicare PPO    ADLS  Support Systems:  Wife/children  Transportation: EMS transportation/[employer]    Meal Preparation: spouse/family    Housing  Home Environment: Lives w/spouse (pt is bed bound, reports that he has supportive family who can provide 24/7)  Steps: NA  Plans to Return to Present Housing: Yes  Other Identified Issues: [address]  Currently active with [address] : No (Critical access hospital in past)     Passport/Waiver : No  Passport/Waiver Services: NA    Durable Medical Equipment   DME Provider: [facility]  Equipment: cane, walker, latonia, hospital bed, WC, SC, HHN    Has Home O2 in place on admit:  No  Informed of need to bring portable home O2 tank on day of discharge for nursing to connect prior to leaving:   Not Indicated  Verbalized agreement/Understanding:   Not Indicated    Community Service Affiliation  Dialysis:  Yes - [facility] M-W-F  Outpatient PT/OT: No    Cancer Center: No     CHF Clinic: No     Pulmonary Rehab: No  Pain Clinic: No  Community Mental Health: No    Wound Clinic: No     Other: NA    DISCHARGE PLAN: Chart reviewed. Met with pt at bedside and explained the role of the CM. Pt reports that he rarely gets out of bed but can sit up at the bedside.  He uses a latonia when needed for transferring out of bed. +Active w/ [facility] M/W/F for HD. He statest that he has good family support at home and if he needs IVABTX at home can have wife and children available to assist. He does not want to return to SNF, prefers home w/HC. [facility] in past. CM will follow and make referrals when appropriate.

## 2018-08-10 NOTE — PROGRESS NOTES
LFT'S  Recent Labs      08/09/18   0946   AST  15   ALT  20   BILITOT  0.9   ALKPHOS  169*     COAG  Recent Labs      08/09/18   0946   INR  1.39*     CARDIAC ENZYMES  Recent Labs      08/09/18   0946   TROPONINI  0.14*       U/A:  Lab Results   Component Value Date    NITRITE neg 05/17/2013    COLORU Yellow 08/09/2018    WBCUA 10-20 08/09/2018    RBCUA 0-2 08/09/2018    MUCUS 1+ 03/12/2018    BACTERIA 2+ 08/09/2018    CLARITYU SL CLOUDY 08/09/2018    SPECGRAV 1.015 08/09/2018    LEUKOCYTESUR LARGE 08/09/2018    BLOODU SMALL 08/09/2018    GLUCOSEU Negative 08/09/2018    AMORPHOUS Rare 03/12/2018       ABG  No results found for: Trude Cl, PHART, THGBART, WLC5JDW, PO2ART, ZSS8UUA  Urine culture 7/31/18    Culture & Susceptibility     ENTEROCOCCUS FAECALIS     Antibiotic Interpretation CHIQUIS Unit   ampicillin Sensitive <=2 mcg/mL   ciprofloxacin Sensitive <=1 mcg/mL   levofloxacin Sensitive <=1 mcg/mL   nitrofurantoin Sensitive <=32 mcg/mL   tetracycline Sensitive <=4 mcg/mL   vancomycin Sensitive 2 mcg/mL         PSEUDOMONAS AERUGINOSA     Antibiotic Interpretation CHIQUIS Unit   cefepime Intermediate 16 mcg/mL   ciprofloxacin Resistant >2 mcg/mL   gentamicin Sensitive <=4 mcg/mL   meropenem Sensitive <=1 mcg/mL   piperacillin-tazobactam Sensitive <=16 mcg/mL   tobramycin Sensitive <=4 mcg/mL          Assessment:  Active Problems:    UTI (urinary tract infection)  Resolved Problems:    * No resolved hospital problems. *      Plan:  1. Dc merrem and vanco iv and start zosyn d# 1  2. Dc home tomorrow on po meds   3. H.D. Today   4.  Pt/ot eval.    Wil Vazquez MD 8/10/2018 12:39 PM

## 2018-08-10 NOTE — PROGRESS NOTES
Pt A/O x 4 assessment completed. PM meds given. Dressing changed to peritoneal dialysis site on abd. Pt denies any needs. Call light within reach and bed alarm on.

## 2018-08-10 NOTE — PROGRESS NOTES
[facility]       Progress Note           Subjective/   65y.o. year old male who we are seeing in consultation for ESRD. Seen on dialysis. Orders confirmed. Pre-weight at HD today 76.8 kg.     Objective/   GEN:  Chronically ill, /72   Pulse 81   Temp 97.1 °F (36.2 °C)   Resp 16   Ht 5' 5\" (1.651 m)   Wt 178 lb (80.7 kg)   SpO2 96%   BMI 29.62 kg/m²   CV: S1, S2 without m/r/g; no LE edema  RESP: CTA B without w/r/r; breathing wnl  ABD:  +bs, soft, nt, nd, no hepatosplenomegaly, PD catheter  ACCESS: [facility]    Data/  Recent Labs      08/09/18   0946  08/10/18   0515   WBC  6.2  5.6   HGB  12.6*  10.9*   HCT  38.4*  33.6*   MCV  94.3  94.6   PLT  139  132*     Recent Labs      08/09/18   0946  08/10/18   0515   NA  137  140   K  4.8  5.1   CL  100  103   CO2  28  25   GLUCOSE  143*  120*   BUN  24*  31*   CREATININE  2.4*  2.8*   LABGLOM  26*  22*   GFRAA  32*  27*       Assessment/     - End stage renal disease - on HD MWF  - Hypertension - controlled  - Anemia of chronic disease - Hb above target 10-12  - UTI - plans per admitting    Plan/     - HD today per schedule - no UF as under EDW of 77 kg  - No KALPESH needs  - Trend labs  - Will see if PD catheter can be removed while he is here since he is no longer interested in   pursuing PD

## 2018-08-10 NOTE — PLAN OF CARE
Problem: Risk for Impaired Skin Integrity  Goal: Tissue integrity - skin and mucous membranes  Structural intactness and normal physiological function of skin and  mucous membranes.    Outcome: Ongoing      Problem: Safety:  Goal: Free from accidental physical injury  Free from accidental physical injury   Outcome: Ongoing      Problem: OXYGENATION/RESPIRATORY FUNCTION  Goal: Patient will maintain patent airway  Outcome: Ongoing

## 2018-08-10 NOTE — CONSULTS
[facility]  Consult Note           Reason for Consult:  End stage renal disease  Requesting Physician:  Dr. Guilherme Mc    Chief Complaint:  UTI  History Obtained From:  patient, electronic medical record    History of Present Ilness:    65year old male with ESRD on HD MWF, CHF admitted with UTI. We have been asked to assist in further dialysis care. Admitted for IV antibiotics for UTI treatment. Recently started on keflex. No sob, abdominal pain, fevers. Intake reduced, +diarrhea.       Past Medical History:        Diagnosis Date    Acute non Q wave MI (myocardial infarction), initial episode of care ([address] 04/01/08    Anemia of chronic disease     Blood transfusion     CAD (coronary artery disease)     Carotid occlusion, right     CHF (congestive heart failure) (HCC)     Chronic atrial fibrillation ([address]     Closed fracture of proximal end of right fibula 6/3/2016    COPD exacerbation ([address] 4/21/2013    Diabetes mellitus ([address]     ESRD (end stage renal disease) on dialysis ([address]     MWF    Glaucoma 2012    Eye surgery this 1 10 2012 and 1 17 2012    Hypertension     Lung disease     COPD    Mitral regurgitation     mild - moderate    Pneumonia 2009    Pulmonary nodule     Skin cancer     nose    Squamous cell carcinoma lung, right ([address] 4/4/2018    Thyroid disease        Past Surgical History:        Procedure Laterality Date    ABDOMEN SURGERY  04/05/2018    peritoneal dialysis catheter inserted    AORTIC VALVE REPLACEMENT  4/14/08    bioprosthesis      CATARACT REMOVAL      left    CATARACT REMOVAL Bilateral     COLONOSCOPY  2/19/13    RECTAL, SIGMOID AND TRANSVERSE POLYPS    CORONARY ARTERY BYPASS GRAFT      5 vessel    HERNIA REPAIR      MOHS SURGERY      OTHER SURGICAL HISTORY  04/27/2018    laparoscopic repositioning of peritoneal dialysis catheter    SPINE SURGERY         Home Medications:    No current facility-administered medications on file prior to encounter. Current Outpatient Prescriptions on File Prior to Encounter   Medication Sig Dispense Refill    pantoprazole (PROTONIX) 20 MG tablet Take 20 mg by mouth daily      polyethylene glycol (MIRALAX) packet Take 17 g by mouth daily as needed for Constipation 527 g 1    bisacodyl (DULCOLAX) 5 MG EC tablet Take 5 mg by mouth daily as needed for Constipation      famotidine (PEPCID) 20 MG tablet Take 20 mg by mouth 2 times daily      insulin aspart (NOVOLOG) 100 UNIT/ML injection vial Inject 1 Units into the skin 3 times daily (before meals) 0   -  150   =  0 units  151 -  200  =  2 units  201 - 250  =  4 units  251 - 300  =  6 units  301 - 350  = 8 units      senna (SENOKOT) 8.6 MG tablet Take 1 tablet by mouth daily      docusate sodium (COLACE, DULCOLAX) 100 MG CAPS Take 100 mg by mouth daily 30 capsule 0    albuterol (PROVENTIL) (2.5 MG/3ML) 0.083% nebulizer solution Take 3 mLs by nebulization every 4 hours (while awake) 120 each 3    isosorbide dinitrate (ISORDIL) 10 MG tablet Take 1 tablet by mouth 2 times daily (Patient taking differently: Take 20 mg by mouth 2 times daily ) 90 tablet 3    amiodarone (PACERONE) 100 MG tablet Take 1 tablet by mouth daily (Patient taking differently: Take 200 mg by mouth daily ) 30 tablet 3    insulin lispro (HUMALOG) 100 UNIT/ML injection vial Inject 0-6 Units into the skin 3 times daily (with meals) 1 vial 3    torsemide (DEMADEX) 20 MG tablet Take 1 tablet by mouth daily 30 tablet 3    famotidine (PEPCID) 20 MG tablet Take 1 tablet by mouth daily 60 tablet 3    latanoprost (XALATAN) 0.005 % ophthalmic solution Place 1 drop into both eyes nightly      levothyroxine (SYNTHROID) 25 MCG tablet Take 25 mcg by mouth Daily      warfarin (COUMADIN) 2 MG tablet Take 1 tablet by mouth daily 135 tablet 3    dorzolamide (TRUSOPT) 2 % ophthalmic solution Place 1 drop into the left eye 2 times daily.       Insulin Glargine (LANTUS SC) Inject 25 Units into the skin nightly 20 to 25 units depending on blood sugar result         Allergies:  Patient has no known allergies. Social History:    Social History     Social History    Marital status:      Spouse name: N/A    Number of children: N/A    Years of education: N/A     Occupational History    Not on file. Social History Main Topics    Smoking status: Former Smoker     Packs/day: 2.00     Years: 46.00     Types: Cigarettes     Quit date: 4/1/2008    Smokeless tobacco: Never Used    Alcohol use No    Drug use: No    Sexual activity: Yes     Partners: Female     Other Topics Concern    Not on file     Social History Narrative    No narrative on file       Family History:   Family History   Problem Relation Age of Onset    Hypertension Mother     Emphysema Mother     Heart Disease Mother     High Blood Pressure Mother     Cancer Father     Emphysema Sister     Cancer Sister     Hypertension Daughter     Hypertension Son     Diabetes Neg Hx     Asthma Neg Hx     Heart Failure Neg Hx        Review of Systems:   Pertinent positives stated above in HPI. Remainder of 10 point review of systems were reviewed and were negative.     Physical exam:   Constitutional:  VITALS:  /72   Pulse 81   Temp 97.1 °F (36.2 °C)   Resp 16   Ht 5' 5\" (1.651 m)   Wt 178 lb (80.7 kg)   SpO2 96%   BMI 29.62 kg/m²   Gen: ill-appearing, nad  Skin: no rash, turgor wnl  Heent:  eomi, mmm  Neck: no bruits or jvd noted, thyroid normal  Cardiovascular:  S1, S2 without m/r/g  Respiratory: CTA B without w/r/r; respiratory effort normal  Abdomen:  +bs, soft, nt, nd, no hepatosplenomegaly, PD catheter  Ext: no lower extremity edema  Access: [facility]  Psychiatric: mood and affect appropriate; judgement and insight intact  Musculoskeletal:  Rom, muscular strength limited; digits, nails normal    Data/  CBC:   Lab Results   Component Value Date    WBC 6.2 08/09/2018    RBC 4.07 08/09/2018    HGB 12.6 08/09/2018    HCT 38.4 08/09/2018    MCV 94.3 08/09/2018    MCH 30.8 08/09/2018    MCHC 32.7 08/09/2018    RDW 18.2 08/09/2018     08/09/2018    MPV 7.6 08/09/2018     BMP:    Lab Results   Component Value Date     08/09/2018    K 4.8 08/09/2018    K 4.6 04/27/2018     08/09/2018    CO2 28 08/09/2018    BUN 24 08/09/2018    LABALBU 3.2 08/09/2018    CREATININE 2.4 08/09/2018    CALCIUM 9.7 08/09/2018    GFRAA 32 08/09/2018    GFRAA 48 05/28/2013    LABGLOM 26 08/09/2018    GLUCOSE 143 08/09/2018         Assessment/    - End stage renal disease - on HD MWF  - Hypertension - controlled  - Anemia of chronic disease - Hb above target 10-12  - UTI - plans per admitting    Plan/    - HD today per schedule  - No KALPESH needs  - Trend labs  - Will see if PD catheter can be removed while he is here since he is no longer interested in   pursuing PD      Thank you for the consultation. Please do not hesitate to call with questions.     [employer]

## 2018-08-10 NOTE — FLOWSHEET NOTE
08/10/18 1810   Wound 08/09/18 Other (Comment) Heel Right eschar   Date First Assessed/Time First Assessed: 08/09/18 1621   Wound Type: (c) Other (Comment)  Wound Event: Not known  Location: Heel  Wound Location Orientation: Right  Wound Description (Comments): eschar  Pre-existing: Yes  Photo Taken: No   Wound Image    Wound Unstageable   Dressing Changed Changed/New   Dressing/Treatment Foam  (off-loading boot)   Wound Cleansed Wound cleanser   Wound Length (cm) 5 cm   Wound Width (cm) 4 cm   Calculated Wound Size (cm^2) (l*w) 20 cm^2   Change in Wound Size % (l*w) 0   Wound Assessment Black; Other (Comment)  (edges loosening)   Drainage Amount Scant   Drainage Description Serosanguinous   Margins Unattached edges   [place]-wound Assessment Intact   Black%Wound Bed 100   Wound 08/09/18 Other (Comment) Heel Left   Date First Assessed/Time First Assessed: 08/09/18 1622   Wound Type: (c) Other (Comment)  Wound Event: Not known  Location: Heel  Wound Location Orientation: Left  Pre-existing: Yes  Photo Taken: No   Wound Image    Wound Type Wound   Wound Unstageable   Dressing Status Clean;Dry; Intact; Changed   Dressing Changed Changed/New   Dressing/Treatment Foam  (off-loding boot)   Wound Cleansed Wound cleanser   Wound Length (cm) 4.5 cm   Wound Width (cm) 5 cm   Calculated Wound Size (cm^2) (l*w) 22.5 cm^2   Change in Wound Size % (l*w) 0   Wound Assessment Black   Drainage Amount Scant   Drainage Description Serosanguinous   Margins Unattached edges   Black%Wound Bed 100   Wound 08/09/18 Other (Comment) Foot Lateral   Date First Assessed/Time First Assessed: 08/09/18 1622   Wound Type: (c) Other (Comment)  Wound Event: Not known  Location: Foot  Wound Location Orientation: Lateral  Pre-existing: Yes  Photo Taken: No   Wound Type Wound   Wound Arterial   Dressing Status Other (Comment)  (SABINE)   Dressing/Treatment Open to air; Pharmaceutical agent (see eMar)  (betadine paint)   Wound Length (cm) 0.5 cm   Wound Width (cm) 0.5 cm   Calculated Wound Size (cm^2) (l*w) 0.25 cm^2   Change in Wound Size % (l*w) 75   Wound Assessment Dry; Intact; Other (Comment)  (thin brown scab)   Drainage Amount None   Margins Attached edges   Wound 08/09/18 Other (Comment) Coccyx   Date First Assessed/Time First Assessed: 08/09/18 1625   Wound Type: (c) Other (Comment)  Wound Event: Not known  Location: Coccyx  Pre-existing: Yes  Photo Taken: No   Wound Type Wound   Wound Pressure Stage  2   Dressing Status Intact   Dressing/Treatment Foam   Wound Assessment Intact; Other (Comment)  (currently blanchable erythema, 2 thin resolving scabs)   Drainage Amount None   Margins Attached edges   [place]%Wound Bed 100                 Wound Care Note - following patient with history of arterial disease. Sensation noted BLE. Unstageable Pressure Injury wounds noted to bilateral heels. Eschar is loosening around the edges with scant serosanguinous drainage. Dressed with Foam dressings and placed in [place] off-loading boots. Few small scabbed areas (<1 cm) noted on bilateral lower leg and feet. All dry and intact and painted with betadine soln. Wound recommend Betadine paint or Santyl enzymatic Debridement ointmentto bilateral heels to help to continue removal of the eschar. Will follow as needed.  Possible discharge home in am.  Thanks,  Mary Hagen RN, [facility] [employer]

## 2018-08-10 NOTE — PROGRESS NOTES
Pharmacy to Dose Warfarin    Dx: Afib. H/O Aortic valve replacement  Goal INR range 2-3 or 2.5-3.5  Home Warfarin dose:2mg daily  New start Warfarin with Heparin/Enoxaparin bridge. Would recommend continue bridge until INR therapeutic. Date  INR  Warfarin  8/9                  1.39                 5mg  8/10                 1.74                2mg  Recommend Warfarin 2mg tonight x1. Daily INR ordered. Rx will continue to manage therapy per consult order. Wilhelmena Bloch Pharm D 5/28/80420:72 PM  .      .

## 2018-08-11 NOTE — PROGRESS NOTES
Patient discharged homed. [id number] [facility] ambulance picked up patient in stretcher. Belongings sent with patient.

## 2018-08-11 NOTE — PROGRESS NOTES
[facility]       Progress Note           Subjective/   65y.o. year old male who we are seeing in consultation for ESRD. HPI:  Last HD on 8/9 with no fluid removal, post-weight of 76.5 kg. Denies any sob. ROS:  Good intake, no fevers.     Objective/   GEN:  Chronically ill, BP (!) 146/68   Pulse 79   Temp 97.3 °F (36.3 °C) (Oral)   Resp 17   Ht 5' 5\" (1.651 m)   Wt 180 lb 12.4 oz (82 kg)   SpO2 95%   BMI 30.08 kg/m²   CV: S1, S2 without m/r/g; no LE edema  RESP: CTA B without w/r/r; breathing wnl  ABD:  +bs, soft, nt, nd, no hepatosplenomegaly, PD catheter  ACCESS: [facility]    Data/  Recent Labs      08/09/18   0946  08/10/18   0515   WBC  6.2  5.6   HGB  12.6*  10.9*   HCT  38.4*  33.6*   MCV  94.3  94.6   PLT  139  132*     Recent Labs      08/09/18   0946  08/10/18   0515   NA  137  140   K  4.8  5.1   CL  100  103   CO2  28  25   GLUCOSE  143*  120*   BUN  24*  31*   CREATININE  2.4*  2.8*   LABGLOM  26*  22*   GFRAA  32*  27*       Assessment/     - End stage renal disease - on HD MWF  - Hypertension - controlled  - Anemia of chronic disease - Hb above target 10-12  - UTI - plans per admitting    Plan/     - HD next Monday per schedule - EDW of 77 kg  - No KALPESH needs  - Trend labs    Okay for discharge

## 2018-08-11 NOTE — PROGRESS NOTES
Spoke with dr Staci Erwin, she is aware pt was discharged. Pt needs to have ampicillin 500 mg 3 x a day called into pharmacy for 6 days.

## 2018-08-11 NOTE — PROGRESS NOTES
[address] [facility] spoke with Hemanth Dorsey Pharmacist ampicillin order.

## 2018-08-12 NOTE — ED PROVIDER NOTES
I independently performed a history and physical on [employer]. All diagnostic, treatment, and disposition decisions were made by myself in conjunction with the advanced practice provider. For further details of [address] emergency department encounter, please see the advance practice provider's documentation. In brief, this is a 65y.o. year old male, with   Chief Complaint   Patient presents with    Urinary Tract Infection     pt contacted by ER 4 days ago  to return to ED for change in atbs being used to treat UTI     Patient presents to the emergency department after a urine culture showed possible MDRO. On exam, patient is chronically ill. Chest is CTAB, RRR. Abdomen is soft, non-tender, non-distended, normal bowel sounds. He also appears to be in an acute CHF exacerbation. Patient was started on appropriate antibiotics and admitted for ID consultation.          Horacio Mchugh MD  08/12/18 [id number]

## 2018-08-14 NOTE — TELEPHONE ENCOUNTER
Called patient and spoke with wife regarding reminder of surgery for 8/20/18. NPO after midnight. Arrive at 7:00am. Stop coumdain, as instructed.  wendy

## 2018-08-15 PROBLEM — L89.610 DECUBITUS ULCER OF RIGHT HEEL, UNSTAGEABLE (HCC): Status: ACTIVE | Noted: 2018-01-01

## 2018-08-15 PROBLEM — I50.42 CHRONIC COMBINED SYSTOLIC AND DIASTOLIC CONGESTIVE HEART FAILURE (HCC): Status: ACTIVE | Noted: 2018-01-01

## 2018-08-15 PROBLEM — L89.612 DECUBITUS ULCER OF RIGHT HEEL, STAGE 2 (HCC): Status: ACTIVE | Noted: 2018-01-01

## 2018-08-15 PROBLEM — R94.30 CARDIAC LEFT VENTRICULAR EJECTION FRACTION 30-35 PERCENT: Status: ACTIVE | Noted: 2018-01-01

## 2018-08-16 NOTE — DISCHARGE SUMMARY
faecalis, not covered by p.o. Keflex, given to the patient on  07/31 and the patient had UTI. Then he had a urine culture sent to [facility]  and it was a different bacteria. The bacteria are not susceptible to any  oral antibiotics and not susceptible to Keflex and the patient was admitted  for further treatment. He has no known allergies and during the hospital  course, he was continued initially on IV Merrem and vancomycin. Later on,  vancomycin was stopped, continued on Merrem. Blood sugars were monitored  and he remained stable, afebrile. So, he did receive antibiotics since  08/09 until 08/11 for 3 days and he remained afebrile. He felt better. PHYSICAL EXAMINATION:  VITAL SIGNS:  Upon discharge on 08/11; temp 97.3, pulse 79, chronic AFib,  respirations 17, blood pressure 146/68, O2 sat 95%. Blood sugar 87. HEENT:  Normal.  NECK:  Supple. LUNGS:  Clear. HEART:  S1, S2, regular rhythm. ABDOMEN:  Soft. EXTREMITIES:  The patient does have large decub on right heel and the  patient is getting Betadine paint and cleaned. During the hospital course, he did continue with dialysis also. LABORATORY DATA:  During this hospitalization sugar was 149. BUN and  creatinine 31 and 2.8, potassium 5.1, bicarbonate 25. WBC 5.6, hemoglobin  and hematocrit 10.9 and 33.6. He has been on Coumadin. INR 2.24. Blood  cultures are negative. Urine culture during this hospitalization; UA still  continued to show large leukocyte esterase, wbc's 10 to 20. EKG; chronic  AFib. Heart rate 45. The patient was continued on meropenem and eventually, he was discharged  home on 08/11, on ampicillin 500 mg 3 times a day p.o. for one more week to  cover Enterococcus faecalis, which was 100,000 colonies. Pseudomonas has  smaller colonies of 50,000 and not treated due to low colony count and also  no oral medications available to treat and the patient remained in a good  condition and he was discharged back to home.   He would continue dialysis  as an outpatient. During the hospital course, he was seen by Jose Liao, the  wound care nurse and the patient has severe right heel wound, nonhealing  with eschar present, which he is getting Betadine and will continue current  treatment. Followup at the office.         Sridhar Cutler MD    D: 08/15/2018 12:40:15       T: 08/16/2018 0:47:58     [email or web address]  Job#: [id number]     Doc#: [id number]    CC:

## 2018-08-19 NOTE — ANESTHESIA PRE PROCEDURE
(Interpreting  [facility]) on 02/20/2018 at 12:43 PM    Pre-Operative Diagnosis: RENAL FAILURE    65 y.o.   BMI:  Body mass index is 27.72 kg/m².      Vitals:    08/13/18 1044 08/20/18 0730   BP:  134/81   Pulse:  82   Resp:  14   Temp:  97.4 °F (36.3 °C)   TempSrc:  Temporal   SpO2:  91%   Weight: 170 lb (77.1 kg) 177 lb (80.3 kg)   Height: 5' 7\" (1.702 m) 5' 7\" (1.702 m)       No Known Allergies    Social History   Substance Use Topics    Smoking status: Former Smoker     Packs/day: 2.00     Years: 46.00     Types: Cigarettes     Quit date: 4/1/2008    Smokeless tobacco: Never Used    Alcohol use No       LABS:    CBC  Lab Results   Component Value Date/Time    WBC 7.1 08/20/2018 07:52 AM    HGB 11.9 (L) 08/20/2018 07:52 AM    HCT 37.1 (L) 08/20/2018 07:52 AM     (L) 08/20/2018 07:52 AM     RENAL  Lab Results   Component Value Date/Time     08/10/2018 05:15 AM    K 5.1 08/10/2018 05:15 AM    K 4.6 04/27/2018 11:59 AM     08/10/2018 05:15 AM    CO2 25 08/10/2018 05:15 AM    BUN 31 (H) 08/10/2018 05:15 AM    CREATININE 2.8 (H) 08/10/2018 05:15 AM    GLUCOSE 120 (H) 08/10/2018 05:15 AM     COAGS  Lab Results   Component Value Date/Time    PROTIME 13.5 (H) 08/20/2018 07:52 AM    PROTIME 19.1 03/15/2013    INR 1.18 (H) 08/20/2018 07:52 AM    APTT 53.8 (H) 06/17/2018 11:57 AM       Tamiko Carter MD   8/19/2018

## 2018-08-20 NOTE — PROGRESS NOTES
Discharge instructions explained to pt and pt spouse . All questions answered. Copy given to pt spouse.  Medical transport called ETA 45 minutes

## 2018-08-20 NOTE — PROGRESS NOTES
Dr. Marylene Fritter notified of patients purulent drainage bilateral heel ulcers. Procedure for today changed -consent rewritten and signed by patients wife per patient request.

## 2018-08-20 NOTE — ANESTHESIA POSTPROCEDURE EVALUATION
Department of Anesthesiology  Postprocedure Note    Patient: Danica Knapp  MRN: [id number]  YOB: 1940  Date of evaluation: 8/20/2018  Time:  3:26 PM     Procedure Summary     Date:  08/20/18 Room / Location:  [place] [address] (HYBRID) / [facility] OR    Anesthesia Start:  1005 Anesthesia Stop:  1495    Procedure:  LEFT BRACHIAL BASILIC ARTERIOVENOUS FISTULA, LEFT ARM (Left ) Diagnosis:       End stage renal disease ([address]      (RENAL FAILURE)    Surgeon: Alan Garcia MD Responsible Provider:  Pam Hughes MD    Anesthesia Type:  general ASA Status:  4          Anesthesia Type: general    Tammy Phase I: Tammy Score: 10    Tammy Phase II: Tammy Score: 10    Last vitals: Reviewed and per EMR flowsheets.        Anesthesia Post Evaluation    Comments: Postoperative Anesthesia Note    Name:    Danica Knapp  MRN:      [id number]    Patient Vitals in the past 12 hrs:  08/20/18 1349, BP:103/66, Temp:97.8 °F (36.6 °C), Temp src:Temporal, Pulse:79, Resp:18, SpO2:97 %  08/20/18 1245, BP:110/67, Temp:98 °F (36.7 °C), Temp src:Temporal, Pulse:78, Resp:16, SpO2:98 %  08/20/18 1230, BP:(!) 127/59, Pulse:80, Resp:12, SpO2:96 %  08/20/18 1215, BP:116/67, Pulse:79, Resp:(!) 7, SpO2:92 %  08/20/18 1210, BP:127/66, Pulse:80, Resp:9, SpO2:93 %  08/20/18 1205, BP:122/72, Temp:97 °F (36.1 °C), Temp src:Temporal, Pulse:81, Resp:9, SpO2:97 %  08/20/18 1200, BP:127/68, Pulse:76, Resp:12, SpO2:99 %  08/20/18 1156, Pulse:78  08/20/18 1140, BP:131/71, Pulse:84, Resp:12, SpO2:100 %  08/20/18 1135, BP:128/77, Pulse:85, Resp:14, SpO2:100 %  08/20/18 1130, BP:134/64, Pulse:82, Resp:16, SpO2:99 %  08/20/18 1125, BP:123/70, Pulse:85, Resp:12, SpO2:99 %  08/20/18 1120, BP:133/72, Pulse:87, Resp:11, SpO2:99 %  08/20/18 1115, BP:125/69, Pulse:88, Resp:20, SpO2:98 %  08/20/18 1110, BP:112/66, Pulse:82, Resp:19, SpO2:96 %  08/20/18 1108, Temp:97.5 °F (36.4 °C), Temp src:Temporal, Pulse:81, Resp:(!) 7  08/20/18 0730, BP:134/81, Temp:97.4

## 2018-08-20 NOTE — BRIEF OP NOTE
Brief Postoperative Note  ______________________________________________________________    Patient: Margie Lopez  YOB: 1940  MRN: [id number]  Date of Procedure: 8/20/2018    Pre-Op Diagnosis: RENAL FAILURE    Post-Op Diagnosis: Same       Procedure(s):  LEFT BRACHIAL BASILIC ARTERIOVENOUS FISTULA, LEFT ARM    Anesthesia: General    Surgeon(s):  Terry Gill MD    Staff:  Surgical Assistant: Ramya Galindo  Scrub Person First: Kyra Dallas     Estimated Blood Loss: Minimal    Complications: None    Specimens:   * No specimens in log *    Implants:  * No implants in log *        Terry Gill MD  Date: 8/20/2018  Time: 10:57 AM

## 2018-09-07 NOTE — PROGRESS NOTES
Outpatient Follow Up Note    Terrence Lawton is 65 y.o. male who presents today for  follow up regarding:    Chief Complaint   Patient presents with    Post-Op Check     patient is sp first stage left ue brachial artery to basilic vein transposition dialysis fistula 8/20/18. wendy        Is seen back 2 weeks after brachial basilic AV fistula. He reports no hand ischemic complaints. He reports no arm swelling.     Past Medical History:   Diagnosis Date    Acute non Q wave MI (myocardial infarction), [facility] 04/01/08    Anemia of chronic disease     Blood transfusion     CAD (coronary artery disease)     Carotid occlusion, right     CHF (congestive heart failure) (HCC)     Chronic atrial fibrillation ([facility]     Closed fracture of proximal end of right fibula 6/3/2016    COPD exacerbation ([facility] 4/21/2013    Diabetes mellitus ([facility]     ESRD (end stage renal disease) on dialysis ([facility]     MWF    Glaucoma 2012    Eye surgery this 1 10 2012 and 1 17 2012    Hemodialysis patient [facility])     M-W-F    History of blood transfusion 2008    with cabg    Hypertension     resolved    Lung disease     COPD    Mitral regurgitation     mild - moderate    Pneumonia 2009    Pulmonary nodule     Skin cancer     nose    Squamous cell carcinoma lung, right ([facility] 4/4/2018    Thyroid disease      Social History:    History   Smoking Status    Former Smoker    Packs/day: 2.00    Years: 46.00    Types: Cigarettes    Quit date: 4/1/2008   Smokeless Tobacco    Never Used     Current Medications:  Current Outpatient Prescriptions   Medication Sig Dispense Refill    levalbuterol (XOPENEX HFA) 45 MCG/ACT inhaler Inhale 1-2 puffs into the lungs every 6 hours as needed for Wheezing      polyethylene glycol (MIRALAX) packet Take 17 g by mouth daily as needed for Constipation 527 g 1    bisacodyl (DULCOLAX) 5 MG EC tablet Take 5 mg by mouth daily as needed for Constipation      insulin aspart size.  LUNGS:  No increased work of breathing and clear to auscultation, no crackles or wheezing  CARDIOVASCULAR:  Regular rate and rhythm with no murmurs, gallops, rubs, or abnormal heart sounds, normal PMI. Pulses: Palpable left brachial and radial pulses. There is some edema around the brachial incision and I cannot feel a thrill but listening with a stethoscope there is a bruit adjacent to the surgical incision. Bruit is heard midway up the upper arm. ABDOMEN:  Normal bowel sounds, non-distended and non-tender to palpation  EXT: No edema, no calf tenderness. Assessment:      Diagnosis Orders   1. Encounter regarding vascular access for dialysis for ESRD ([facility]  VL Hemodialysis Access Scan       Plan:     I will obtain a duplex exam of the fistula to assess whether or not it is mature enough at this point for a transposition procedure I am somewhat concerned as I cannot hear a bruit up to the axillary region. If the vein has not matured enough for is not usable then I would suggest we convert this to a brachial artery to distal basilic vein/axillary vein upper arm graft. We will call patient to schedule surgery once the duplex has been obtained. His Coumadin will need to be held for several days prior to the procedure as it was the last time.         Mariana Zarco MD, FACS

## 2018-09-08 PROBLEM — N39.0 UTI (URINARY TRACT INFECTION): Status: RESOLVED | Noted: 2018-01-01 | Resolved: 2018-01-01

## 2018-09-12 NOTE — TELEPHONE ENCOUNTER
Called patient regarding date and time of his hemodialysis scan at [place]. Spoke with patients wife.  Scheduled for 9/13/18 at 12:00pm/arrive at 11:30am.wendy

## 2018-09-25 NOTE — ANESTHESIA PRE PROCEDURE
inguinal    MOHS SURGERY      nose    OTHER SURGICAL HISTORY  04/27/2018    laparoscopic repositioning of peritoneal dialysis catheter    SC AV ANAST,UP ARM BASILIC VEIN TRANSPOSIT Left 8/20/2018    LEFT BRACHIAL BASILIC ARTERIOVENOUS FISTULA, LEFT ARM [facility]         Social History:    Social History   Substance Use Topics    Smoking status: Former Smoker     Packs/day: 2.00     Years: 46.00     Types: Cigarettes     Quit date: 4/1/2008    Smokeless tobacco: Never Used    Alcohol use No                                Counseling given: Not Answered      Vital Signs (Current):   Vitals:    09/19/18 1418   Weight: 172 lb (78 kg)   Height: 5' 7\" (1.702 m)                                              BP Readings from Last 3 Encounters:   09/07/18 120/80   08/20/18 98/61   08/20/18 103/66       NPO Status:                                                                                 BMI:   Wt Readings from Last 3 Encounters:   09/07/18 175 lb (79.4 kg)   08/20/18 177 lb (80.3 kg)   08/11/18 180 lb 12.4 oz (82 kg)     Body mass index is 26.94 kg/m². CBC:   Lab Results   Component Value Date    WBC 7.1 08/20/2018    RBC 3.93 08/20/2018    HGB 11.9 08/20/2018    HCT 37.1 08/20/2018    MCV 94.3 08/20/2018    RDW 18.0 08/20/2018     08/20/2018       CMP:   Lab Results   Component Value Date     08/20/2018    K 4.8 08/20/2018    K 4.6 04/27/2018    CL 99 08/20/2018    CO2 25 08/20/2018    BUN 53 08/20/2018    CREATININE 3.1 08/20/2018    GFRAA 24 08/20/2018    GFRAA 48 05/28/2013    AGRATIO 0.9 08/09/2018    LABGLOM 20 08/20/2018    GLUCOSE 220 08/20/2018    PROT 6.7 08/09/2018    PROT 7.0 08/15/2012    CALCIUM 10.2 08/20/2018    BILITOT 0.9 08/09/2018    ALKPHOS 169 08/09/2018    AST 15 08/09/2018    ALT 20 08/09/2018       POC Tests: No results for input(s): POCGLU, POCNA, POCK, POCCL, POCBUN, POCHEMO, POCHCT in the last 72 hours.     Coags:   Lab Results   Component Value Date    PROTIME 13.5 08/20/2018    PROTIME 19.1 03/15/2013    INR 1.18 08/20/2018    APTT 53.8 06/17/2018       HCG (If Applicable): No results found for: PREGTESTUR, PREGSERUM, HCG, HCGQUANT     ABGs: No results found for: PHART, PO2ART, YWM4XWN, IOX3LDJ, BEART, J0SKNIML     Type & Screen (If Applicable):  No results found for: LABABO, [address]    Anesthesia Evaluation  Patient summary reviewed and Nursing notes reviewed no history of anesthetic complications:   Airway: Mallampati: II  TM distance: >3 FB   Neck ROM: full  Mouth opening: > = 3 FB Dental:    (+) upper dentures and lower dentures      Pulmonary:Negative Pulmonary ROS and normal exam    (+) COPD:      (-) pneumonia                           Cardiovascular:Negative CV ROS    (+) hypertension:, valvular problems/murmurs (s/p AVR bovine in 2008.  ): AS, past MI:, CAD:, CABG/stent (s/p CABG x 5 in 2008. no chest pain. > 4 mets. off coumadin. ):, CHF (EF 30-35%):,         Rhythm: irregular                   ROS comment: Chronic afib. No chest pain. > 4 mets. Off coumadin for procedure. Neuro/Psych:   Negative Neuro/Psych ROS              GI/Hepatic/Renal: Neg GI/Hepatic/Renal ROS  (+) renal disease (ESRD MWF [facility]. last dialysis wednesday (yesterday). recheck potassium 4.7): ESRD,      (-) hiatal hernia and GERD       Endo/Other: Negative Endo/Other ROS   (+) Diabetes, . Abdominal:           Vascular:                                   NPO > MN    afib    ECHO  Summary   Left ventricular systolic function is moderately reduced with ejection   fraction estimated at 30-35 %.   There is akinesis of the entire inferior wall.  Remaining left ventricular   wall segments are hypokinetic.   Left ventricle size is normal.   There is mild concentric left ventricular hypertrophy.   Grade II diastolic dysfunction with elevated filling pressure.   A bioprosthetic aortic valve appears well seated with a maximum gradient of   23 mmHg and a mean gradient of 13 mmHg. COLBY 0.78 cm2   Trivial aortic regurgitation is present.    Valvular obstruction is suspected.     4/19/18 Dr. Eloy Schneider cardiac clearance    Pre-Operative Diagnosis: RENAL FAILURE    65 y.o.   BMI:  Body mass index is 27.41 kg/m². Vitals:    09/19/18 1418 09/27/18 0757   BP:  123/64   Pulse:  67   Resp:  12   Temp:  97.6 °F (36.4 °C)   TempSrc:  Temporal   SpO2:  100%   Weight: 172 lb (78 kg) 175 lb (79.4 kg)   Height: 5' 7\" (1.702 m) 5' 7\" (1.702 m)       No Known Allergies    Social History   Substance Use Topics    Smoking status: Former Smoker     Packs/day: 2.00     Years: 46.00     Types: Cigarettes     Quit date: 4/1/2008    Smokeless tobacco: Never Used    Alcohol use No       LABS:    CBC  Lab Results   Component Value Date/Time    WBC 6.7 09/27/2018 08:25 AM    HGB 13.1 (L) 09/27/2018 08:25 AM    HCT 41.4 09/27/2018 08:25 AM     (L) 09/27/2018 08:25 AM     RENAL  Lab Results   Component Value Date/Time     (L) 09/27/2018 08:25 AM    K 4.7 09/27/2018 09:00 AM    K 6.1 (HH) 09/27/2018 08:25 AM    CL 96 (L) 09/27/2018 08:25 AM    CO2 25 09/27/2018 08:25 AM    BUN 29 (H) 09/27/2018 08:25 AM    CREATININE 2.3 (H) 09/27/2018 08:25 AM    GLUCOSE 183 (H) 09/27/2018 08:25 AM     COAGS  Lab Results   Component Value Date/Time    PROTIME 18.9 (H) 09/27/2018 08:25 AM    PROTIME 19.1 03/15/2013    INR 1.66 (H) 09/27/2018 08:25 AM    APTT 40.4 (H) 09/27/2018 08:25 AM        Anesthesia Plan      general     ASA 3     (I discussed with the patient the risks and benefits of PIV, general anesthesia, IV Narcotics, PACU. All questions were answered the patient agrees with the plan)  Induction: intravenous. Anesthetic plan and risks discussed with patient. Plan discussed with CRNA.                   Darrell Lyle MD   9/25/2018

## 2018-09-27 PROBLEM — Z99.2 ENCOUNTER REGARDING VASCULAR ACCESS FOR DIALYSIS FOR ESRD (HCC): Status: ACTIVE | Noted: 2018-01-01

## 2018-09-27 NOTE — PROGRESS NOTES
Dr. Janie Nichols Notified of new K. He marked the arm. Magalie Flores Spoke with pt. Pt awaiting vishnu.

## 2018-09-27 NOTE — PROGRESS NOTES
Pt feeling good. Awake. Ready to go home. Denies pain. Rt arm warm, positive thrill and briut  sleave in place.

## 2018-09-27 NOTE — OP NOTE
[facility]                   Harini Hoffman                                 OPERATIVE REPORT    PATIENT NAME: Milton Tamez                   :        1940  MED REC NO:   [id number]                          ROOM:  ACCOUNT NO:   [de-identified]                           ADMIT DATE: 2018  PROVIDER:     Jason Garcia MD    DATE OF PROCEDURE:  2018    PREOPERATIVE DIAGNOSES:  1.  End-stage renal disease. 2.  Status post first stage brachial artery basilic vein AV fistula. POSTOPERATIVE DIAGNOSES:  1.  End-stage renal disease. 2.  Status post first stage brachial artery basilic vein AV fistula. PROCEDURE:  Revision of brachial basilic AV fistula with superficialization  and transposition. ANESTHESIA:  General.    INDICATIONS:  The patient is 18-year-old male with end-stage renal disease. He had previously undergone a first-stage brachial basilic AV fistula. He  is brought to the operating at this time to undergo a second stage  superficialization procedure. DESCRIPTION OF PROCEDURE:  The patient is brought to the operating room and  placed in supine position. General anesthesia induced. Ultrasound was  performed of the upper arm and the AV fistula. Side branches of the  basilic vein were identified and marked on the skin as well as the course  of the basilic vein from the arterial anastomosis at the antecubital region  to the axilla. Through a series of skip incisions, the basilic vein was  mobilized from the arterial anastomosis all the way to the axilla. Small  side branches were ligated using 3-0 silk ties and hemoclips. Once the  vein was completely mobilized, it was clamped at the arterial anastomosis  and transected just distal to this. The vein was pulled out through the  small incision in the axilla. The vein was flushed with heparinized saline  solution and dilated slightly.   Using a Cole tunneler,

## 2018-09-27 NOTE — PROGRESS NOTES
Spoke with Baljeet Garcia in [place] with Dr. Sherrill Francisco. Notified of K of 6.1. Notified anesthesia was aware and that a stat K is being redrawn.  No new orders

## 2018-10-24 PROBLEM — I50.23 ACUTE ON CHRONIC SYSTOLIC (CONGESTIVE) HEART FAILURE (HCC): Status: ACTIVE | Noted: 2018-01-01

## 2018-10-24 NOTE — ED NOTES
0507- Consult placed to hospitalist Dr. Ophelia Coronel for [facility]   2010- Dr. Ophelia Coronel input admission orders on patient.    [facility]  10/24/18 [facility]  10/24/18 2014

## 2018-10-24 NOTE — ED TRIAGE NOTES
Chief Complaint   Patient presents with    Shortness of Breath     pt arrives to room 08 via ems c/o low o2 after dialysis, ems states pt with hypotensive at  94/44, ems states pt was 71% on RA, ems states pt always has ams but worse then normal per ems, ems states pt coming from home

## 2018-10-24 NOTE — ED PROVIDER NOTES
PEPTIDE - Abnormal; Notable for the following:     Pro-BNP >70,000 (*)     All other components within normal limits    Narrative:     Performed at:  [facility] [address],  [place], [facility]   Phone [phone]   PROCALCITONIN - Abnormal; Notable for the following:     Procalcitonin 0.31 (*)     All other components within normal limits    Narrative:     Performed at:  [facility] [address],  [place], [facility]   Phone [phone]   BLOOD GAS, VENOUS - Abnormal; Notable for the following:     pH, Arslan 7.290 (*)     pCO2, Arslan 57.7 (*)     pO2, Arslan 42.6 (*)     Carboxyhemoglobin 2.9 (*)     All other components within normal limits    Narrative:     Performed at:  [facility],  [place], [facility]   Phone [phone]   URINE RT REFLEX TO CULTURE       All other labs were within normal range or not returned as of this dictation. EKG: All EKG's are interpreted by the Emergency Department Physician who either signs orCo-signs this chart in the absence of a cardiologist.  Please see their note for interpretation of EKG. RADIOLOGY:   Non-plain film images such as CT, Ultrasound and MRI are read by the radiologist. Plain radiographic images are visualized andpreliminarily interpreted by the  ED Provider with the below findings:        Interpretation perthe Radiologist below, if available at the time of this note:    CT CHEST WO CONTRAST   Final Result   Stable parenchymal mass right upper lobe compatible with primary pulmonary   malignancy. Stable mild mediastinal lymphadenopathy. Moderate right and tiny left pleural effusions unchanged over 9 days but   increased since March 2018. This could be treatment related but cannot   exclude metastatic spread to the pleural space.       Patchy airspace opacity within the lingular segment and left lower lobe   unchanged over 9

## 2018-10-25 PROBLEM — J15.6 GRAM-NEGATIVE PNEUMONIA (HCC): Status: ACTIVE | Noted: 2018-01-01

## 2018-10-25 PROBLEM — J96.21 ACUTE ON CHRONIC RESPIRATORY FAILURE WITH HYPOXIA (HCC): Status: ACTIVE | Noted: 2018-01-01

## 2018-10-25 NOTE — PROGRESS NOTES
Family at beside refusing mepilex  Placement over wounds on bilateral heels and asking for them to be left open to air with betadine applied.  Wound care eval treatment already order and dressing removed per family request.

## 2018-10-25 NOTE — PROGRESS NOTES
Nephrology consult called to Dr. Jerrica Alexander on call. Spoke with [place] @[phone]. Dennis Mom PCA/MT  10/25/2018    Dr. Jerrica Alexander returned call back @[phone].

## 2018-10-25 NOTE — PROGRESS NOTES
Pt is hypotensive, and asymptomatic. Informed Dr Claudia Camacho, he wanted nephro called. Nephro paged at this time.

## 2018-10-25 NOTE — CONSULTS
REMOVAL      left    CATARACT REMOVAL Bilateral     COLONOSCOPY  2/19/13    RECTAL, SIGMOID AND TRANSVERSE POLYPS    CORONARY ARTERY BYPASS GRAFT      5 vessel    HERNIA REPAIR Left     inguinal    MOHS SURGERY      nose    OTHER SURGICAL HISTORY  04/27/2018    laparoscopic repositioning of peritoneal dialysis catheter    MT AV ANAST,UP ARM BASILIC VEIN TRANSPOSIT Left 8/20/2018    LEFT BRACHIAL BASILIC ARTERIOVENOUS FISTULA, LEFT ARM performed by Tyron Garcia MD at [address] ANAST,UP ARM BASILIC VEIN TRANSPOSIT Left 9/27/2018    SECOND STAGE LEFT UPPER EXTREMITY BRACHIAL ARTERY TO BASILIC VEIN ARTERIAL VENOUS FISTULA performed by Tyron Garcia MD at [address] [facility]         Current Medications:    No current facility-administered medications on file prior to encounter.       Current Outpatient Prescriptions on File Prior to Encounter   Medication Sig Dispense Refill    potassium chloride (MICRO-K) 10 MEQ extended release capsule Take 10 mEq by mouth daily      midodrine (PROAMATINE) 5 MG tablet Take 5 mg by mouth as needed For hypotension      torsemide (DEMADEX) 20 MG tablet TAKE 1 TABLET TWICE A DAY (Patient taking differently: currently takes once per day) 180 tablet 0    levalbuterol (XOPENEX HFA) 45 MCG/ACT inhaler Inhale 1-2 puffs into the lungs every 6 hours as needed for Wheezing      bisacodyl (DULCOLAX) 5 MG EC tablet Take 5 mg by mouth daily as needed for Constipation      insulin aspart (NOVOLOG) 100 UNIT/ML injection vial Inject 1 Units into the skin 3 times daily (before meals) 0   -  150   =  0 units  151 -  200  =  2 units  201 - 250  =  4 units  251 - 300  =  6 units  301 - 350  = 8 units      docusate sodium (COLACE, DULCOLAX) 100 MG CAPS Take 100 mg by mouth daily 30 capsule 0    albuterol (PROVENTIL) (2.5 MG/3ML) 0.083% nebulizer solution Take 3 mLs by nebulization every 4 hours (while awake) 120 each 3    isosorbide dinitrate (ISORDIL) 10 MG tablet Take 1 tablet by mouth 2 times daily (Patient taking differently: Take 20 mg by mouth 2 times daily ) 90 tablet 3    amiodarone (PACERONE) 100 MG tablet Take 1 tablet by mouth daily (Patient taking differently: Take 200 mg by mouth daily ) 30 tablet 3    latanoprost (XALATAN) 0.005 % ophthalmic solution Place 1 drop into both eyes nightly      levothyroxine (SYNTHROID) 25 MCG tablet Take 25 mcg by mouth Daily      warfarin (COUMADIN) 2 MG tablet Take 1 tablet by mouth daily 135 tablet 3    dorzolamide (TRUSOPT) 2 % ophthalmic solution Place 1 drop into the left eye 2 times daily.  Insulin Glargine (LANTUS SC) Inject 12 Units into the skin nightly 12 to 18 units depending on blood sugar result         Allergies:  Patient has no known allergies. Social History:    Social History     Social History    Marital status:      Spouse name: N/A    Number of children: N/A    Years of education: N/A     Occupational History    Not on file. Social History Main Topics    Smoking status: Former Smoker     Packs/day: 2.00     Years: 46.00     Types: Cigarettes     Quit date: 4/1/2008    Smokeless tobacco: Never Used    Alcohol use No    Drug use: No    Sexual activity: Yes     Partners: Female     Other Topics Concern    Not on file     Social History Narrative    No narrative on file       Family History:   Family History   Problem Relation Age of Onset    Hypertension Mother     Emphysema Mother     Heart Disease Mother     High Blood Pressure Mother     Cancer Father     Emphysema Sister     Cancer Sister     Hypertension Daughter     Hypertension Son     Diabetes Neg Hx     Asthma Neg Hx     Heart Failure Neg Hx        REVIEW OF SYSTEMS:    Review of Systems  Unable to obtain because of the patient's current mental state.     PHYSICAL EXAM:    Vitals:    /62   Pulse 68   Temp 97.2 °F (36.2 °C)   Resp 20   Ht 5' 7\" (1.702 m)   Wt 172 lb 6.4 oz (78.2 kg)   SpO2 93%   BMI 27.00 kg/m²

## 2018-10-25 NOTE — DISCHARGE INSTR - COC
Continuity of Care Form    Patient Name: Josette Bryant   :  1940  MRN:  [id number]    Admit date:  10/24/2018  Discharge date:  18      Code Status Order: Full Code   Advance Directives:   Advance Care Flowsheet Documentation     Date/Time Healthcare Directive Type of Healthcare Directive Copy in [address] Agent's Name Healthcare Agent's Phone Number    10/24/18 2145  No, patient does not have an advance directive for healthcare treatment --  Yes, copy in chart -- -- --          Admitting Physician:  No admitting provider for patient encounter.   PCP: Jean-Claude Mcfarlane MD    Discharging Nurse: Deep Spencer Unit/Room#: /3237-91  Discharging Unit Phone Number: [phone]    Emergency Contact:   Extended Emergency Contact Information  Primary Emergency Contact: Caitlyn Paez  Address: [address] Phone: [phone]  Relation: Spouse  Secondary Emergency Contact: Angelo Pelayo   [address] Phone: [phone]  Relation: Child    Past Surgical History:  Past Surgical History:   Procedure Laterality Date    ABDOMEN SURGERY  2018    peritoneal dialysis catheter inserted    AORTIC VALVE REPLACEMENT  08    bioprosthesis      CATARACT REMOVAL      left    CATARACT REMOVAL Bilateral     COLONOSCOPY  13    RECTAL, SIGMOID AND TRANSVERSE POLYPS    CORONARY ARTERY BYPASS GRAFT      5 vessel    HERNIA REPAIR Left     inguinal    MOHS SURGERY      nose    OTHER SURGICAL HISTORY  2018    laparoscopic repositioning of peritoneal dialysis catheter    UT AV ANAST,UP ARM BASILIC VEIN TRANSPOSIT Left 2018    LEFT BRACHIAL BASILIC ARTERIOVENOUS FISTULA, LEFT ARM performed by Esequiel Brittle, MD at [address] ANAST,UP ARM BASILIC VEIN TRANSPOSIT Left 2018    SECOND STAGE LEFT UPPER EXTREMITY BRACHIAL ARTERY TO BASILIC VEIN regarding vascular access for dialysis for ESRD ([facility] N18.6, Z99.2    Other mechanical complication of intraperitoneal dialysis catheter T85.691A    Moderate malnutrition ([facility] E44.0    Chronic combined systolic and diastolic congestive heart failure (HCC) I50.42    Cardiac left ventricular ejection fraction 30-35 percent R94.30    Decubitus ulcer of right heel, unstageable ([facility]) L89.610    Acute on chronic systolic (congestive) heart failure (HCC) I50.23       Isolation/Infection:   Isolation          No Isolation            Nurse Assessment:  Last Vital Signs: /62   Pulse 68   Temp 97.2 °F (36.2 °C)   Resp 20   Ht 5' 7\" (1.702 m)   Wt 172 lb 6.4 oz (78.2 kg)   SpO2 93%   BMI 27.00 kg/m²     Last documented pain score (0-10 scale): Pain Level: 0  Last Weight:   Wt Readings from Last 1 Encounters:   10/25/18 172 lb 6.4 oz (78.2 kg)     Mental Status:  disoriented, alert and able to concentrate and follow conversation    IV Access:  - Dialysis Catheter  - site  right, subclavian and internal jugular, insertion date: 5/16/18    Nursing Mobility/ADLs:  Walking   Dependent  Transfer  Dependent  Bathing  Dependent  Dressing  Dependent  Toileting  Dependent  Feeding  Assisted  Med Admin  Assisted  Med Delivery   whole    Wound Care Documentation and Therapy:  Wound 05/10/18 Buttocks Left (Active)   Wound Type Wound 10/25/2018  9:00 AM   Wound Pressure Stage  2 10/24/2018 11:54 PM   Dressing Status Clean;Dry; Intact 10/25/2018  9:00 AM   Dressing Changed Changed/New 10/25/2018  9:00 AM   Dressing/Treatment Foam 10/25/2018  9:00 AM   Wound Cleansed Wound cleanser 10/24/2018 11:54 PM   Wound Length (cm) 7 cm 10/24/2018 11:54 PM   Wound Width (cm) 3.2 cm 10/24/2018 11:54 PM   Wound Depth (cm)  0.1 10/24/2018 11:54 PM   Calculated Wound Size (cm^2) (l*w) 22.4 cm^2 10/24/2018 11:54 PM   Change in Wound Size % (l*w) -18311.42 10/24/2018 11:54 PM   Wound Assessment Clean;Dry; Intact 10/24/2018 11:54 PM   Drainage Clean;Dry; Intact 10/25/2018  9:00 AM   Odor None 9/27/2018 11:53 AM   Dressing Status Clean;Dry; Intact 10/25/2018  9:00 AM   Number of days: 27        Elimination:  Continence:   · Bowel: Yes and incontinent at times  · Bladder: Yes and oliguric  Urinary Catheter: None   Colostomy/Ileostomy/Ileal Conduit: No       Date of Last BM: 11/06/18    No intake or output data in the 24 hours ending 10/25/18 1102  No intake/output data recorded. Safety Concerns: At Risk for Falls    Impairments/Disabilities:      None    Nutrition Therapy:  Current Nutrition Therapy:   - Oral Diet:  Dysphagia 1 pureed, Renal, Low Sodium (2gm) and low potassium    Routes of Feeding: Oral  Liquids: Thin Liquids  Daily Fluid Restriction: yes - amount 1000  Last Modified Barium Swallow with Video (Video Swallowing Test): not done    Treatments at the Time of Hospital Discharge:   Respiratory Treatments: levalbuterol nebulizer solution 1.25 mg threes times daily; levalbuterol 2 puffs every 4 hours as needed for wheezing  Oxygen Therapy:  is on oxygen at 2 L/min per nasal cannula. Ventilator:    - No ventilator support    Rehab Therapies: Skilled Nurse  - Recommend patient for Home Care Vitals    Weight Bearing Status/Restrictions: No weight bearing restirctions  Other Medical Equipment (for information only, NOT a DME order):  wheelchair and hospital bed  Other Treatments: Current Wound Care Treatment-Cleanse with NSS, Apply Santyl enzymatis debriding ointment to wounds, cover with Alginate with AG and Foam dressing, off-load with boots while in bed or chair.     Patient's personal belongings (please select all that are sent with patient):  Dentures upper, blanket    RN SIGNATURE:  Electronically signed by Dagmar Moreno RN on 11/6/18 at 3:37 PM    CASE MANAGEMENT/SOCIAL WORK SECTION    Inpatient Status Date: 10/24/18    Readmission Risk Assessment Score:  Readmission Risk              Risk of Unplanned Readmission:        43

## 2018-10-25 NOTE — PROGRESS NOTES
Pt yelling out for his wife. This RN went into room to attempt to reorient pt without success. Pt states he doesn't want to be here and to send him home.

## 2018-10-25 NOTE — CONSULTS
lungs every 6 hours as needed for Wheezing    Historical Provider, MD   bisacodyl (DULCOLAX) 5 MG EC tablet Take 5 mg by mouth daily as needed for Constipation    Historical Provider, MD   insulin aspart (NOVOLOG) 100 UNIT/ML injection vial Inject 1 Units into the skin 3 times daily (before meals) 0   -  150   =  0 units  151 -  200  =  2 units  201 - 250  =  4 units  251 - 300  =  6 units  301 - 350  = 8 units    Historical Provider, MD   docusate sodium (COLACE, DULCOLAX) 100 MG CAPS Take 100 mg by mouth daily 5/22/18   Erick Trivedi MD   albuterol (PROVENTIL) (2.5 MG/3ML) 0.083% nebulizer solution Take 3 mLs by nebulization every 4 hours (while awake) 5/21/18   Erick Trivedi MD   isosorbide dinitrate (ISORDIL) 10 MG tablet Take 1 tablet by mouth 2 times daily  Patient taking differently: Take 20 mg by mouth 2 times daily  2/22/18   Blake Gaines MD   amiodarone (PACERONE) 100 MG tablet Take 1 tablet by mouth daily  Patient taking differently: Take 200 mg by mouth daily  2/23/18   Blake Gaines MD   latanoprost (XALATAN) 0.005 % ophthalmic solution Place 1 drop into both eyes nightly    Historical Provider, MD   levothyroxine (SYNTHROID) 25 MCG tablet Take 25 mcg by mouth Daily    Historical Provider, MD   warfarin (COUMADIN) 2 MG tablet Take 1 tablet by mouth daily 12/18/17   Skylar Reina MD   dorzolamide (TRUSOPT) 2 % ophthalmic solution Place 1 drop into the left eye 2 times daily. Historical Provider, MD   Insulin Glargine (LANTUS SC) Inject 12 Units into the skin nightly 12 to 18 units depending on blood sugar result 8/10/10   Historical Provider, MD        Allergies:  Patient has no known allergies. Review of Systems:   All 14 point review of symptoms completed. Pertinent positives identified in the HPI, all other review of symptoms negative as below.     Physical Examination:    Vitals:    10/25/18 0718   BP: 104/62   Pulse: 68   Resp: 18   Temp: 97.2 °F (36.2 °C)   SpO2: 96% Weight: 172 lb 6.4 oz (78.2 kg)         General Appearance:  Alert, confused with garbled speech at time; appears older than stated age; dry/cracked lips   Head:  Normocephalic, without obvious abnormality, atraumatic   Eyes:  PERRL, conjunctiva/corneas clear       Nose: Nares normal, no drainage or sinus tenderness   Throat: Lips, mucosa, and tongue normal   Neck: Supple, symmetrical, trachea midline, no adenopathy, thyroid: not enlarged, symmetric, no tenderness/mass/nodules, no carotid bruit or JVD       Lungs:   , respirations unlabored   Chest Wall:  No tender+crackles left baseness or deformity   Heart:  +irregularly irregular, S1, S2 normal, no murmur, rub or gallop   Abdomen:   Soft, non-tender, bowel sounds active all four quadrants,  no masses, no organomegaly           Extremities: Extremities normal, atraumatic, no cyanosis; 2+ LLE edema and 1+ RLE edema   Pulses: 2+ and symmetric   Skin: Skin color, texture, turgor normal, no rashes or lesions   Pysch: Normal mood and affect   Neurologic: Normal gross motor and sensory exam.         Labs  CBC:   Lab Results   Component Value Date    WBC 4.9 10/24/2018    RBC 4.43 10/24/2018    HGB 13.7 10/24/2018    HCT 42.2 10/24/2018    MCV 95.1 10/24/2018    RDW 18.5 10/24/2018    PLT 97 10/24/2018     CMP:    Lab Results   Component Value Date     10/25/2018    K 4.2 10/25/2018    K 6.1 09/27/2018     10/25/2018    CO2 23 10/25/2018    BUN 30 10/25/2018    CREATININE 2.8 10/25/2018    GFRAA 27 10/25/2018    GFRAA 48 05/28/2013    AGRATIO 0.8 10/24/2018    LABGLOM 22 10/25/2018    GLUCOSE 188 10/25/2018    PROT 6.0 10/24/2018    PROT 7.0 08/15/2012    CALCIUM 8.5 10/25/2018    BILITOT 1.3 10/24/2018    ALKPHOS 208 10/24/2018    AST 55 10/24/2018    ALT 86 10/24/2018     PT/INR:  No results found for: PTINR  Lab Results   Component Value Date    TROPONINI 0.20 (H) 10/24/2018       EKG: 10/24/18  I have reviewed EKG with the following interpretation: No acute findings. ECHO 2/20/18 Summary   Left ventricular systolic function is moderately reduced with ejection fraction estimated at 30-35 %.   There is akinesis of the entire inferior wall. Remaining left ventricular wall segments are hypokinetic.   Left ventricle size is normal.   There is mild concentric left ventricular hypertrophy.   Grade II diastolic dysfunction with elevated filling pressure.   A bioprosthetic aortic valve appears well seated with a maximum gradient of 23 mmHg and a mean gradient of 13 mmHg. COLBY 0.78 cm2   Trivial aortic regurgitation is present.    Valvular obstruction is suspected. Assessment:  Paloma Martinez is a 77 y.o. patient who presented to [facility]24/18 with mental status change and hypoxia. Normally follows with my partner, Dr. Martin Brandt, for cardiac care. He has hx of CAD s/p CABG and bioprosthetic AVR in 2008, severe ischemic cardiomyopathy EF=20-25%, HTN, HLD, DM, chronic afib, CRI, and PVD/carotid artery disease.  Note 3/7/14 ECHO showed LV size is borderline; mild concentric LVH: EF=20-25% (no change from 2012). Most recent ECHO 2/20/18 EF 30-35%; akinesis of the entire inferior wall; mild LVH, Grade II DD; bioprosthetic aortic valve appears well seated with a maximum gradient of 23 mmHg and a mean gradient of 13mmHg Valvular obstruction is suspected. He underwent regularly scheduled HD yesterday while being transported home EMT noticed his mentation had declined sats on RA 70%, he was feeling SOB so they immediately brought to the ED. Admit EKG showed afib; left axis; LBBB (no change from 8/18 EKG). Note CXR possible multifocal PNA; RUL mass; moderate right effusion; trace left effusion. Note pro-BNP >70,000, troponin 0.20, and BUN/Cr=30/2.8. Diagnosis of hyoxic respiratory failure and SOB in elderly male with multiple med problems including CAD s/p CABG and bioprosthetic AVR, severe ischemic CM, ESRD, and lung cancer. Recs:  1.  Supplement O2 as needed and

## 2018-10-25 NOTE — PROGRESS NOTES
bronchodilator. 2. Discontinue if patient experiences worsened bronchospasm, or secretions have lessened to the point that the patient is able to clear them with a cough. Anti-inflammatory and Combination Medications:    1. If the patient lacks prior history of lung disease, is not using inhaled anti-inflammatory medication at home, and lacks wheezing by examination or by history for at least 24 hours, contact physician for possible discontinuation.

## 2018-10-26 NOTE — PROGRESS NOTES
Nutrition Assessment    Type and Reason for Visit: Initial, Positive Nutrition Screen (sore gums; wounds )    Nutrition Recommendations:   1. Added 1 Can of Nepro Carb Steady QD  2. Monitor intakes diet tolerance d/t mouth sore  3. Monitor BS, weight and Nutr. Labs       Malnutrition Assessment:  · Malnutrition Status: At risk for malnutrition  · Context: Acute illness or injury  · Findings of the 6 clinical characteristics of malnutrition (Minimum of 2 out of 6 clinical characteristics is required to make the diagnosis of moderate or severe Protein Calorie Malnutrition based on AND/ASPEN Guidelines):  1. Energy Intake-51% to 75%,  (x 1 meal )    2. Weight Loss-No significant weight loss, in 3 months  3. Fat Loss-Unable to assess,    4. Muscle Loss-Unable to assess,    5. Fluid Accumulation-Unable to assess,    6.  Strength-Not measured    Nutrition Diagnosis:   · Problem: Increased nutrient needs  · Etiology: related to Endocrine dysfunction, Cardiac dysfunction     Signs and symptoms:  as evidenced by Presence of wounds, Known losses from dialysis    Nutrition Assessment:  · Subjective Assessment: pt nutitionally compromised d/t ESRD with HD and has noted pressure areas.  Pt remains at risk for nutritional compromise d/t acute CHF; with intake 51-75%  · Nutrition-Focused Physical Findings: unable to see pt this day r/t HD; 2 pressure areas noted per wound care flow sheet; intakes > 50% and weight stable; no BM's noted   · Wound Type:    · Current Nutrition Therapies:  · Oral Diet Orders: Carb Control 4 Carbs/Meal, Dental Soft   · Oral Diet intake: 51-75%  · Oral Nutrition Supplement (ONS) Orders: None  · ONS intake:    · Anthropometric Measures:  · Ht: 5' 7\" (170.2 cm)   · Current Body Wt: 171 lb (77.6 kg)  · Admission Body Wt: 173 lb (78.5 kg)  · Usual Body Wt: 173 lb (78.5 kg)  · % Weight Change:  (none),   (NA)  · Ideal Body Wt: 148 lb (67.1 kg), % Ideal Body  (114)  · Adjusted Body Wt:  , body weight

## 2018-10-26 NOTE — PROGRESS NOTES
flush 0.9 % injection 10 mL  10 mL Intravenous PRN Singh Munroe MD        prochlorperazine (COMPAZINE) injection 10 mg  10 mg Intravenous Q6H PRN Singh Cerda MD         No Known Allergies  Active Problems:    Acute on chronic systolic (congestive) heart failure (HCC)    HCAP (healthcare-associated pneumonia)    Acute on chronic respiratory failure with hypoxia (HCC)    Gram-negative pneumonia ([address]  Resolved Problems:    * No resolved hospital problems. *    Blood pressure (!) 87/48, pulse 55, temperature 96.5 °F (35.8 °C), temperature source Oral, resp. rate 16, height 5' 7\" (1.702 m), weight 173 lb 8 oz (78.7 kg), SpO2 98 %. Subjective:  Symptoms:  Stable. No shortness of breath or chest pain. Diet:  Poor intake. No nausea. Objective:  General Appearance:  Comfortable. Vital signs: (most recent): Blood pressure (!) 87/48, pulse 55, temperature 96.5 °F (35.8 °C), temperature source Oral, resp. rate 16, height 5' 7\" (1.702 m), weight 173 lb 8 oz (78.7 kg), SpO2 98 %. No fever. Lungs: There are decreased breath sounds and rhonchi. Heart: Irregular rhythm. No murmur. Abdomen: Abdomen is soft. Bowel sounds are normal.   There is no abdominal tenderness. Extremities: There is no dependent edema. Neurological: (Drowsy but opens eyes & appears to understand simple commands). Assessment & Plan  Pneumonia  CRF-on hemodialysis  H/O CHF-no additional input from cardiology  Plan. Per orders           Follow CXR           IV abx  Matilde Lino MD  10/26/2018

## 2018-10-26 NOTE — PROGRESS NOTES
Patient's EF (Ejection Fraction) is less than 40%    Patient has a past medical history of Acute non Q wave MI (myocardial infarction), initial episode of care [facility]); Anemia of chronic disease; Blood transfusion; CAD (coronary artery disease); Carotid occlusion, right; CHF (congestive heart failure) ([facility] Chronic atrial fibrillation ([facility] Closed fracture of proximal end of right fibula; COPD exacerbation ([facility] Diabetes mellitus ([facility] ESRD (end stage renal disease) on dialysis ([facility] Glaucoma; Hemodialysis patient [facility]); History of blood transfusion; Hypertension; Lung disease; Mitral regurgitation; Pneumonia; Pneumonia; Pulmonary nodule; Skin cancer; Squamous cell carcinoma lung, right ([facility] and Thyroid disease. Comorbidities reviewed and education provided. Patient and family's stated goal of care: be more comfortable prior to discharge    Patient's current functional capacity:  Marked limitation of physical activity. Comfortable at rest. Less than ordinary activity causes fatigue, palpitation, or dyspnea. Pt resting in bed at this time on 3 L O2. Pt denies shortness of breath. Pt trace lower extremity edema. Patient's weights and intake/output reviewed:    Patient Vitals for the past 96 hrs (Last 3 readings):   Weight   10/25/18 0357 172 lb 6.4 oz (78.2 kg)   10/24/18 2140 172 lb 8 oz (78.2 kg)   10/24/18 1644 177 lb (80.3 kg)       Intake/Output Summary (Last 24 hours) at 10/25/18 2116  Last data filed at 10/25/18 2004   Gross per 24 hour   Intake              370 ml   Output                0 ml   Net              370 ml       Patient provided with education on CHF signs/symptoms, causes, discharge medications, daily weights, low sodium diet, activity, and follow-up. Notified patient to call the doctor post discharge if patient experiences shortness of breath, chest pain, swelling, cough, or weight gain of three pounds in a day/five pounds in a week.  Also notified patient to call the doctor with

## 2018-10-26 NOTE — H&P
docusate, albuterol inhaled nebulizer treatment, Isordil 10 mg  twice a day, amiodarone 100 mg daily. He is on Synthroid 25 mcg  daily, Coumadin 2 mg daily and Lantus 12 units daily. He is on  latanoprost and Xalatan eyedrops both eyes daily and Trusopt 2%  eyedrops left eye b.i.d. ALLERGIES:  The patient has no known allergies. CODE STATUS:  He is a full code. REVIEW OF SYSTEMS:  Unable to obtain. The patient seems to be  confused, disoriented, oriented to the person, slightly unclear  speech, oxygen on. Otherwise, review of systems, not able to obtain  due to confusion. He is not in acute distress. PHYSICAL EXAMINATION:  GENERAL:  Well built , well nourished. VITAL SIGNS:  Temperature 96.4, pulse 58, respirations 20, blood  pressure 92/58. He in on 4 liter oxygen with O2 sat 95%. Weight 172  pounds with BMI of 27.1. On admission, ABGs show pH 7.29, pCO2 of  57.7, pO2 of 42.6, bicarb 27.1. HEENT:  Normal.  NECK:  Supple. LUNGS:  Diminished breath sounds. No rales or rhonchi heard. HEART:  S1 and S2, irregular rhythm with chronic AFib. ABDOMEN:  Soft, mildly obese, nontender. Bowel sounds present. The  patient still has peritoneal dialysis catheter intact in the  peritoneum, but he is on hemodialysis, peritoneal dialysis catheter  nonfunctioning. EXTREMITIES:  Trace edema both lower extremities. SKIN:  Intact. NEURO:  The patient mentally is confused, somewhat disoriented to the  place and time. Otherwise, he is nonambulatory, mostly in the  wheelchair. LABORATORY DATA:  Sodium 135, potassium 3.7, chloride 98, bicarb 22,  BUN and creatinine 22 and 2.2, the patient's GFR is 35, magnesium 2.1,  glucose 188, calcium 8.5, total protein 6, proBNP more than 70,000,  troponin 1 was 0.20, albumin 2.7. Liver enzymes, alk phosphatase 208,  SGOT and SGPT are 86 and 55. Protein 6. WBC 4.9, hemoglobin and  hematocrit are 13.7 and 42.2, platelets normal.  INR is 4.16.     The patient also has

## 2018-10-27 NOTE — PROGRESS NOTES
Pharmacy note:  Warfarin  INR = 3.53    INR is above goal range of 2-3. Will hold warfarin today and continue to dose per daily INR. Chuy Irvin Pharm. D. 10/27/2018 2:36 PM

## 2018-10-27 NOTE — PROGRESS NOTES
Pt yelling out, and worse mentation than admission. Informed Dr Zelia Pallas. Head CT without contrast ordered for the morning. The wife called and this RN updated wife with test for morning. Wife seemed to be happy about the test being ordered.

## 2018-10-27 NOTE — PROGRESS NOTES
Hourly rounding complete. Pt appears to be sleeping at this time as manifested by eyes being closed. Respirations easy. No distress noted. Will continue to monitor.

## 2018-10-27 NOTE — PROGRESS NOTES
hemodialysis  Plan. Cont abx           Nephrology follow up            No cardiac workup needed at this time per cardiology  Conrado Mcknight MD  10/27/2018

## 2018-10-28 NOTE — PROGRESS NOTES
Jayson Lorenzo is a 77 y.o. male patient.     Current Facility-Administered Medications   Medication Dose Route Frequency Provider Last Rate Last Dose    levalbuterol (XOPENEX HFA) inhaler 2 puff  2 puff Inhalation TID Carlos Gomes MD   2 puff at 10/28/18 1122    meropenem (MERREM) 500 mg IVPB minibag  500 mg Intravenous Q24H Deshawn Reed MD   Stopped at 10/28/18 0940    glucose (GLUTOSE) 40 % oral gel 15 g  15 g Oral PRN Deshawn Reed MD        dextrose 50 % solution 12.5 g  12.5 g Intravenous PRN Deshawn Reed MD   12.5 g at 10/27/18 0650    glucagon (rDNA) injection 1 mg  1 mg Intramuscular PRN Deshawn Reed MD        dextrose 5 % solution  100 mL/hr Intravenous PRN Deshawn Reed MD        midodrine (PROAMATINE) tablet 10 mg  10 mg Oral PRN Emily Sousa MD        heparin (porcine) injection 3,500 Units  3,500 Units Intercatheter PRN Emily Sousa MD   3,500 Units at 10/26/18 1346    povidone-iodine (BETADINE) 10 % external solution   Topical PRN Jordan Gonzalez MD        HYDROcodone-acetaminophen (NORCO) 5-325 MG per tablet 1 tablet  1 tablet Oral Q8H PRN Carlos Gomes MD   1 tablet at 10/26/18 2111    levalbuterol (XOPENEX HFA) inhaler 2 puff  2 puff Inhalation Q4H PRN Singh Hdz MD        warfarin (COUMADIN) daily dosing (placeholder)   Other RX Placeholder Singh Hdz MD        vancomycin 1000 mg IVPB in 250 mL D5W addavial  1,000 mg Intravenous Once Kush Palacios PA-C        amiodarone (CORDARONE) tablet 100 mg  100 mg Oral Daily Singh Cerda MD   100 mg at 10/28/18 0910    dorzolamide (TRUSOPT) 2 % ophthalmic solution 1 drop  1 drop Left Eye BID Vickie Cerda MD   1 drop at 10/28/18 0910    insulin glargine (LANTUS) injection vial 12 Units  12 Units Subcutaneous Nightly Vickie Cerda MD   12 Units at 10/25/18 2007    isosorbide dinitrate (ISORDIL) tablet 10 mg  10 mg Oral BID Aneudy Hernandez MD   10 mg at 10/28/18

## 2018-10-28 NOTE — PROGRESS NOTES
RESPIRATORY THERAPY ASSESSMENT    Name:  [address] Record Number:  [id number]  Age: 77 y.o. Gender: male  : 1940  Today's Date:  10/28/2018  Room:  [facility] [id number]    Assessment     Is the patient being admitted for a COPD or Asthma exacerbation? No   (If yes the patient will be seen every 4 hours for the first 24 hours and then reassessed)    Patient Admission Diagnosis      Allergies  No Known Allergies    Minimum Predicted Vital Capacity:     1006          Actual Vital Capacity:      360          Pulmonary History:copd/pulmonary nodule  Home Oxygen Therapy:  room air  Home Respiratory Therapy:albuterol/xopenex   Current Respiratory Therapy:  xopenex 2 puffs qid  Treatment Type: MDI  Medications: Levalbuterol HCL    Respiratory Severity Index(RSI)   Patients with orders for inhalation medications, oxygen, or any therapeutic treatment modality will be placed on Respiratory Protocol. They will be assessed with the first treatment and at least every 72 hours thereafter. The following severity scale will be used to determine frequency of treatment intervention.     Smoking History: Pulmonary Disease or Smoking History, Greater than 15 pack year = 2    Social History  Social History   Substance Use Topics    Smoking status: Former Smoker     Packs/day: 2.00     Years: 46.00     Types: Cigarettes     Quit date: 2008    Smokeless tobacco: Never Used    Alcohol use No       Recent Surgical History: None = 0  Past Surgical History  Past Surgical History:   Procedure Laterality Date    ABDOMEN SURGERY  2018    peritoneal dialysis catheter inserted    AORTIC VALVE REPLACEMENT  08    bioprosthesis      CATARACT REMOVAL      left    CATARACT REMOVAL Bilateral     COLONOSCOPY  13    RECTAL, SIGMOID AND TRANSVERSE POLYPS    CORONARY ARTERY BYPASS GRAFT      5 vessel    HERNIA REPAIR Left     inguinal    MOHS SURGERY      nose    OTHER SURGICAL HISTORY  2018 laparoscopic repositioning of peritoneal dialysis catheter    UT AV ANAST,UP ARM BASILIC VEIN TRANSPOSIT Left 8/20/2018    LEFT BRACHIAL BASILIC ARTERIOVENOUS FISTULA, LEFT ARM performed by Jaylon Mora MD at [address] ANAST,UP ARM BASILIC VEIN TRANSPOSIT Left 9/27/2018    SECOND STAGE LEFT UPPER EXTREMITY BRACHIAL ARTERY TO BASILIC VEIN ARTERIAL VENOUS FISTULA performed by Jaylon Mora MD at [address]         Level of Consciousness: Alert, Follows Commands but Disoriented = 1    Level of Activity: Mostly sedentary, minimal walking = 2    Respiratory Pattern: Regular Pattern; RR 8-20 = 0    Breath Sounds: Diminshed bilaterally and/or crackles = 2    Sputum  Sputum Color: White, Tenacity: Thick, Sputum How Obtained: Spontaneous cough  Cough: Strong, spontaneous, non-productive = 0    Vital Signs   BP (!) 94/51   Pulse 64   Temp 98 °F (36.7 °C) (Axillary)   Resp 18   Ht 5' 7\" (1.702 m)   Wt 176 lb 5.9 oz (80 kg)   SpO2 99%   BMI 27.62 kg/m²   SPO2 (COPD values may differ): 86-87% on room air or greater than 92% on FiO2 35- 50% = 3    Peak Flow (asthma only): not applicable = 0    RSI: 3-56 = TID (three times daily) and Q4hr PRN for dyspnea        Plan       Goals: medication delivery and improve oxygenation    Patient/caregiver was educated on the proper method of use for Respiratory Care Devices:  Yes      Level of patient/caregiver understanding able to:   [x] Verbalize understanding   [] Demonstrate understanding       [] Teach back        [] Needs reinforcement       []  No available caregiver               []  Other:     Response to education:  Mari Other     Is patient being placed on Home Treatment Regimen? No     Does the patient have everything they need prior to discharge?   NA     Comments: chart reviewed and patient assessed    Plan of Care: change xopenex 2 puffs 4 times daily to tid per assessment    Electronically signed by Dilcia Adames RCP on 10/28/2018 at 12:27     Respiratory Protocol Guidelines     1. Assessment and treatment by Respiratory Therapy will be initiated for medication and therapeutic interventions upon initiation of aerosolized medication. 2. Physician will be contacted for respiratory rate (RR) greater than 35 breaths per minute. Therapy will be held for heart rate (HR) greater than 140 beats per minute, pending direction from physician. 3. Bronchodilators will be administered via Metered Dose Inhaler (MDI) with spacer when the following criteria are met:  a. Alert and cooperative     b. HR < 140 bpm  c. RR < 30 bpm                d. Can demonstrate a 2-3 second inspiratory hold  4. Bronchodilators will be administered via Hand Held Nebulizer [facility]) to patients when ANY of the following criteria are met  a. Incognizant or uncooperative          b. Patients treated with HHN at Home        c. Unable to demonstrate proper use of MDI with spacer     d. RR > 30 bpm   5. Bronchodilators will be delivered via Metered Dose Inhaler (MDI), HHN, Aerogen to intubated patients on mechanical ventilation. 6. Inhalation medication orders will be delivered and/or substituted as outlined below. Aerosolized Medications Ordering and Administration Guidelines:    1. All Medications will be ordered by a physician, and their frequency and/or modality will be adjusted as defined by the patients Respiratory Severity Index (RSI) score. 2. If the patient does not have documented COPD, consider discontinuing anticholinergics when RSI is less than 9.  3. If the bronchospasm worsens (increased RSI), then the bronchodilator frequency can be increased to a maximum of every 4 hours. If greater than every 4 hours is required, the physician will be contacted. 4. If the bronchospasm improves, the frequency of the bronchodilator can be decreased, based on the patient's RSI, but not less than home treatment regimen frequency.   5. Bronchodilator(s) will be discontinued if patient has a RSI less than 9 and has received no scheduled or as needed treatment for 72  Hrs. Patients Ordered on a Mucolytic Agent:    1. Must always be administered with a bronchodilator. 2. Discontinue if patient experiences worsened bronchospasm, or secretions have lessened to the point that the patient is able to clear them with a cough. Anti-inflammatory and Combination Medications:    1. If the patient lacks prior history of lung disease, is not using inhaled anti-inflammatory medication at home, and lacks wheezing by examination or by history for at least 24 hours, contact physician for possible discontinuation.

## 2018-10-29 NOTE — PROGRESS NOTES
Notified MD regarding patients escalating behaviors, patient yelling and screaming out thru the night, pulling at tele monitor wires, throwing the monitor off the bed, pt not redirectable. Wife called patient to try to calm him,  Called this nurse and asked if something could be given to calm him. MD notified, as no PRN meds are available at this time for this purpose. Will monitor.

## 2018-10-29 NOTE — PROGRESS NOTES
Patient's EF (Ejection Fraction) is less than 40%    Patient has a past medical history of Acute non Q wave MI (myocardial infarction), initial episode of care [facility]); Anemia of chronic disease; Blood transfusion; CAD (coronary artery disease); Carotid occlusion, right; CHF (congestive heart failure) ([facility] [address] Chronic atrial fibrillation ([address] Closed fracture of proximal end of right fibula; COPD exacerbation ([address] Diabetes mellitus ([address] ESBL (extended spectrum beta-lactamase) producing bacteria infection; ESRD (end stage renal disease) on dialysis ([address] Glaucoma; Hemodialysis patient [facility]); History of blood transfusion; Hypertension; Lung disease; Mitral regurgitation; Pneumonia; Pneumonia; Pulmonary nodule; Skin cancer; Squamous cell carcinoma lung, right ([address] and Thyroid disease. Comorbidities reviewed and education provided. Patient and family's stated goal of care: increase activity tolerance prior to discharge    Patient's current functional capacity:  Marked limitation of physical activity. Comfortable at rest. Less than ordinary activity causes fatigue, palpitation, or dyspnea. Pt sitting in bed at this time on 2 L O2. Pt denies shortness of breath. Pt with pitting lower extremity edema. Patient's weights and intake/output reviewed:    Patient Vitals for the past 96 hrs (Last 3 readings):   Weight   10/29/18 1135 173 lb 1 oz (78.5 kg)   10/29/18 0740 176 lb 5.9 oz (80 kg)   10/29/18 0356 182 lb 1.6 oz (82.6 kg)       Intake/Output Summary (Last 24 hours) at 10/29/18 1803  Last data filed at 10/29/18 1747   Gross per 24 hour   Intake             1263 ml   Output             2020 ml   Net             -757 ml       Patient provided with education on CHF signs/symptoms, causes, discharge medications, daily weights, low sodium diet, activity, and follow-up.  Notified patient to call the doctor post discharge if patient experiences shortness of breath, chest pain, swelling, cough, or weight gain of three

## 2018-10-29 NOTE — PROGRESS NOTES
Progress Note    HISTORY     CC:  AMS, we are following for ESRD       Subjective/     Seen at HD, needing iv albumin and middodrine for soft BP; goal up to 2l UF   he is confused and doesn't want to be bothered     ROS:  Constitutional:  No fevers  Cardiovascular:  + edema   Respiratory:  No wheezing  Skin:  No rash, no itching  :  Not making much urine     Social Hx:    - No visitor     Past Medical and Surgical History:  - Reviewed, no changes     EXAM       Objective/     Vitals:    10/28/18 2335 10/29/18 0356 10/29/18 0727 10/29/18 0740   BP: 102/60 96/60  (!) 104/48   Pulse: 72 62  72   Resp: 16 16 17 16   Temp: 97.1 °F (36.2 °C) 97 °F (36.1 °C)  97.7 °F (36.5 °C)   TempSrc: Oral Oral     SpO2: 97% 94% 91%    Weight:  182 lb 1.6 oz (82.6 kg)  176 lb 5.9 oz (80 kg)   Height:         24HR INTAKE/OUTPUT:      Intake/Output Summary (Last 24 hours) at 10/29/18 0958  Last data filed at 10/29/18 0356   Gross per 24 hour   Intake              240 ml   Output                0 ml   Net              240 ml     Constitutional:  ill appearing but more alert   Eyes:  Pupils reactive, sclera clear   Neck:  Normal thyroid, no masses   Cardiovascular:  Regular, no rub  Respiratory:  No distress, no wheezing, crackles left > right   Psychiatry:  Flat affect, confused and somnolent   Abdomen: +bs, soft, nt, no masses   Musculoskeletal: No LE edema, no clubbing   Lymphatics:  No LAD in neck, no supraclavicular nodes   Access:  Right [facility]       MEDICAL DECISION MAKING       Data/  Recent Labs      10/29/18   0422   WBC  7.2   HGB  12.3*   HCT  37.3*   MCV  93.2   PLT  112*     Recent Labs      10/27/18   0516  10/28/18   0541  10/29/18   0422   NA  130*  133*  126*   K  4.5  5.2*  5.0   CL  94*  94*  88*   CO2  28  26  24   GLUCOSE  54*  108*  163*   MG  1.90  2.00  2.00   BUN  23*  31*  40*   CREATININE  2.7*  3.7*  4.0*   LABGLOM  23*  16*  15*   GFRAA  28*  19*  18*       Assessment/   Mr. Cal Estrada is a 77year old

## 2018-10-30 NOTE — PROGRESS NOTES
Shift handoff given to [facility]. Pt denies any needs at this time. Wife at bedside. Care transferred.

## 2018-10-30 NOTE — PROGRESS NOTES
Progress Note    Admit Date:  10/24/2018    Subjective:chf, ams   Mr. Clifton Prader is sleepy, wife at bedside ,pt more confused and disoriented to place here compared to home     Objective:   /62   Pulse 68   Temp 96 °F (35.6 °C) (Oral)   Resp 18   Ht 5' 7\" (1.702 m)   Wt 174 lb 13.2 oz (79.3 kg)   SpO2 96%   BMI 27.38 kg/m²     Intake/Output Summary (Last 24 hours) at 10/30/18 1747  Last data filed at 10/30/18 1741   Gross per 24 hour   Intake              510 ml   Output                0 ml   Net              510 ml       Physical Exam:  General:  Awake, alert, NAD  Skin:  Warm and dry  Neck:  No JVP  Cardiovascular:  RRR S1S2 no murmurs  Chest:  Clear to auscultation and percussion  Abdomen:  Soft non tender BS present,   Extremities:  2 + edema legs and arms   Neuro: Cranials II-XII intact      Medications:   Scheduled Meds:   collagenase   Topical Daily    levalbuterol  2 puff Inhalation TID    meropenem  500 mg Intravenous Q24H    b complex-C-folic acid  1 capsule Oral Daily    warfarin (COUMADIN) daily dosing (placeholder)   Other RX Placeholder    amiodarone  100 mg Oral Daily    dorzolamide  1 drop Left Eye BID    insulin glargine  12 Units Subcutaneous Nightly    latanoprost  1 drop Both Eyes Nightly    levothyroxine  25 mcg Oral Daily    sodium chloride flush  10 mL Intravenous 2 times per day       Continuous Infusions:   dextrose         Data:    CBC   Recent Labs      10/29/18   0422  10/30/18   0427   WBC  7.2  6.4   HGB  12.3*  12.1*   HCT  37.3*  36.2*   PLT  112*  97*      RENAL  Recent Labs      10/28/18   0541  10/29/18   0422   NA  133*  126*   K  5.2*  5.0   CL  94*  88*   CO2  26  24   BUN  31*  40*   CREATININE  3.7*  4.0*     LFT'S  No results for input(s): AST, ALT, ALB, BILIDIR, BILITOT, ALKPHOS in the last 72 hours.   COAG  Recent Labs      10/28/18   0542  10/29/18   0422  10/30/18   0427   INR  2.65*  2.26*  2.33*     CARDIAC ENZYMES  No results for input(s): CKTOTAL,

## 2018-10-31 NOTE — PROGRESS NOTES
Hand off report given to [facility]. Pt is in stable condition at this time. Call light and bedside table within reach.  Electronically signed by Woody Winchester RN on 10/31/2018 at 7:44 AM

## 2018-10-31 NOTE — PROGRESS NOTES
Pharmacy note:  Warfarin  INR = 2.67    INR is within goal range of 2-3. Will give warfarin 1 mg today and continue to dose per daily INR. Yun Joel Pharm D 10/31/236699:15 PM  .      . Amada Proctor

## 2018-10-31 NOTE — PROGRESS NOTES
with hypoxia ([facility] [address]    Gram-negative pneumonia ([facility] [address]  Resolved Problems:    * No resolved hospital problems. *      Plan: On fluid restriction 1 lit/ day   Low salt diet   Nephrology following    Iv merrem on h.d. Day for poss. uti and pneumonia   Added senna and dulcolax   H.d. Today s/p   Surgical consult for P.D. Cath removal .  Dc plan home  onmerrem on h.d.  Days       Jorge Mcclure MD 10/31/2018 5:43 PM

## 2018-10-31 NOTE — PROGRESS NOTES
Shift assessment complete. Medications given per order. Pt repositioned. Telesitter in place. Call light and bed side table within reach.  Electronically signed by Tosha Posada RN on 10/30/2018 at 9:20 PM

## 2018-11-01 NOTE — PROGRESS NOTES
Progress Note    HISTORY     CC:  AMS, we are following for ESRD       Subjective/   More alert today  BP low and getting intermittent NS boluses    HD on 10/31, UF 1.2 l , 76.9-->75.7 kg;BP in the 80s w iv albumin  HD on 10/29 w 1.5 l UF, 80-->78.5 kg, with iv albumin and middodrine for BP    ROS:  Constitutional:  No fevers  Cardiovascular:  + edema   Respiratory:  No wheezing  Skin:  No rash, no itching  :  Not making much urine     Social Hx:    - No visitor     Past Medical and Surgical History:  - Reviewed, no changes     EXAM       Objective/     Vitals:    11/01/18 0747 11/01/18 0757 11/01/18 0921 11/01/18 1055   BP: (!) 84/47 (!) 85/49 (!) 84/48 (!) 72/40   Pulse:   65 62   Resp:    16   Temp:    97.1 °F (36.2 °C)   TempSrc:    Axillary   SpO2:       Weight:       Height:         24HR INTAKE/OUTPUT:      Intake/Output Summary (Last 24 hours) at 11/01/18 1135  Last data filed at 11/01/18 0818   Gross per 24 hour   Intake              190 ml   Output                0 ml   Net              190 ml     Constitutional: sleepy  Eyes:  Pupils reactive, sclera clear   Neck:  Normal thyroid, no masses   Cardiovascular:  Regular, no rub  Respiratory:  No distress, no wheezing, crackles left > right   Psychiatry:  Flat affect, confused and somnolent   Abdomen: +bs, soft, nt, no masses   Musculoskeletal: No LE edema, no clubbing   Lymphatics:  No LAD in neck, no supraclavicular nodes   Access:  Right [facility]       MEDICAL DECISION MAKING       Data/  Recent Labs      10/30/18   0427  10/31/18   0418  11/01/18   0429   WBC  6.4  6.4  7.6   HGB  12.1*  12.5*  12.8*   HCT  36.2*  37.8*  39.7*   MCV  93.0  93.2  94.9   PLT  97*  104*  113*     Recent Labs      10/31/18   0418   NA  130*   K  4.6   CL  95*   CO2  26   GLUCOSE  110*   PHOS  4.5   BUN  32*   CREATININE  4.0*   LABGLOM  15*   GFRAA  18*       Assessment/   Mr. Sebas Benson is a 77year old male with PMHx of CAD, CHF, ESRD, COPD, atrial fibrillation, DM and

## 2018-11-01 NOTE — PROGRESS NOTES
3833  11/01/18   0430   INR  2.33*  2.67*  2.08*     CARDIAC ENZYMES  No results for input(s): CKTOTAL, CKMB, CKMBINDEX, TROPONINI in the last 72 hours. U/A:    Lab Results   Component Value Date    NITRITE neg 05/17/2013    COLORU BROWN 10/25/2018    WBCUA >100 10/25/2018    RBCUA see below 10/25/2018    MUCUS 1+ 03/12/2018    BACTERIA 3+ 10/25/2018    CLARITYU TURBID 10/25/2018    SPECGRAV 1.025 10/25/2018    LEUKOCYTESUR LARGE 10/25/2018    BLOODU LARGE 10/25/2018    GLUCOSEU Negative 10/25/2018    AMORPHOUS Rare 03/12/2018   CXR   Impression:        Increasing perihilar and bibasilar opacities, favored to represent worsening  pulmonary edema.  Superimposed infection is a possibility in the appropriate  clinical setting. Bilateral pleural effusions, increased on the left. Collected: 10/25/18 1721   Order Status: Completed Specimen: Urine, clean catch Updated: 10/28/18 0080    Urine Culture, Routine --    Organism Klebsiella pneumoniae ESBL (A)    Urine Culture, Routine -- (A)    >100,000 CFU/ml   CONTACT PRECAUTIONS INDICATED   Carbapenem antibiotics are the best option for infections caused   by ESBL-producing organisms.  Other antibiotic classes are   likely to result in treatment failure, even for organisms   demonstrating in vitro susceptibility         Assessment:  Active Problems:    Acute on chronic systolic (congestive) heart failure (HCC)    HCAP (healthcare-associated pneumonia)    Acute on chronic respiratory failure with hypoxia (HCC)    Gram-negative pneumonia ([address]  Resolved Problems:    * No resolved hospital problems. *      Plan:  ivf boluses x 2   proamatine 10 mg daily   Low salt diet   Nephrology following    Iv merrem on h.d. Day for uti and pneumonia   Added senna and dulcolax   H. D.tomorrow   Resume coumadin ,  S/p surgical consult PD removal as o.p.        Luiz Ormond, MD 11/1/2018 11:40 AM

## 2018-11-01 NOTE — PROGRESS NOTES
Spoke with Dr. Mora Atwood, order to d/c Coreg and Lantus. Continue to monitor patient, no additional orders received at this time.

## 2018-11-02 NOTE — PROGRESS NOTES
Progress Note    HISTORY     CC:  AMS, we are following for ESRD         Subjective/     Seen at HD on 11/2, w 1.7 l UF, 79.1---> 76.4 kg    HD on 10/31, UF 1.2 l , 76.9-->75.7 kg;BP in the 80s w iv albumin  HD on 10/29 w 1.5 l UF, 80-->78.5 kg, with iv albumin and middodrine for BP    ROS:  Constitutional:  No fevers  Cardiovascular:  + edema   Respiratory:  No wheezing  Skin:  No rash, no itching  :  Not making much urine     Social Hx:    - No visitor     Past Medical and Surgical History:  - Reviewed, no changes     EXAM       Objective/     Vitals:    11/02/18 0328 11/02/18 0730 11/02/18 1110 11/02/18 1316   BP: 100/60 (!) 108/57 (!) 96/35    Pulse: 66 68 102    Resp: 16 18 18   Temp: 96.7 °F (35.9 °C) 97.9 °F (36.6 °C) 97.9 °F (36.6 °C)    TempSrc: Oral      SpO2: 90%   91%   Weight: 173 lb 6.4 oz (78.7 kg) 174 lb 6.1 oz (79.1 kg) 168 lb 6.9 oz (76.4 kg)    Height:         24HR INTAKE/OUTPUT:      Intake/Output Summary (Last 24 hours) at 11/02/18 1343  Last data filed at 11/02/18 1147   Gross per 24 hour   Intake              637 ml   Output             2000 ml   Net            -1363 ml     Constitutional: sleepy  Eyes:  Pupils reactive, sclera clear   Neck:  Normal thyroid, no masses   Cardiovascular:  Regular, no rub  Respiratory:  No distress, no wheezing, crackles left > right   Psychiatry:  Flat affect, confused and somnolent   Abdomen: +bs, soft, nt, no masses   Musculoskeletal: No LE edema, no clubbing   Lymphatics:  No LAD in neck, no supraclavicular nodes   Access:  Right [facility]       MEDICAL DECISION MAKING       Data/  Recent Labs      10/31/18   0418  11/01/18   0429  11/02/18   0418   WBC  6.4  7.6  7.4   HGB  12.5*  12.8*  12.9*   HCT  37.8*  39.7*  39.5*   MCV  93.2  94.9  95.1   PLT  104*  113*  111*     Recent Labs      10/31/18   0418  11/02/18 0418   NA  130*  130*   K  4.6  4.8   CL  95*  99   CO2  26  22   GLUCOSE  110*  143*   PHOS  4.5  4.1   BUN  32*  26*   CREATININE  4.0*

## 2018-11-02 NOTE — PROGRESS NOTES
Transport here to take patient to dialysis. Patient currently on 2 L NC and sleeping in bed. Patient in stable condition. Tameka Witt, HUGON, RN.

## 2018-11-02 NOTE — PROGRESS NOTES
Patient yelling and screaming out in dialysis and very agitated. 0.5 mg Ativan PRN given per [facility] order. Will continue to monitor. HUGO NovakN, RN.

## 2018-11-02 NOTE — PROGRESS NOTES
BP this evening is 97/54. Perfect serve sent to  updating him. For now he does not want to transfer pt to ICU since BP is improving. ICU and clinical aware. Transfer order discontinue.  Electronically signed by Mirian Cranker, RN on 11/1/2018 at 8:30 PM

## 2018-11-02 NOTE — CARE COORDINATION
INTERDISCIPLINARY PLAN OF CARE CONFERENCE    Date/Time: 11/2/2018 4:32 PM  Completed by: Stiven Winter, Case Management      Patient Name:  Malou Farah  YOB: 1940  Admitting Diagnosis: Acute on chronic systolic (congestive) heart failure ([facility] [address] [I50.23]     Admit Date/Time:  10/24/2018  4:24 PM    Chart reviewed. Interdisciplinary team met to discuss patient progress and discharge plans. Disciplines included Case Management, Nursing, and Dietitian. Current Status:stable    PT/OT recommendation:N/A    Anticipated Discharge Date: tbd  Expected D/C Disposition:  Home  Confirmed plan with patient and/or family   Discharge Plan Comments: Chart reviewed. Plan continues for pt to return home with spouse and granddtr with katja with [facility] for SN. Active with [facility] for [facility] and [employer]. Spouse wants to take pt home and would like [facility] for transport home. Will follow.  +eCOC          Home O2 in place on admit: No  Pt informed of need to bring portable home O2 tank on day of discharge for nursing to connect prior to leaving:  Not Indicated  Verbalized agreement/Understanding:  Not Indicated

## 2018-11-03 NOTE — PROGRESS NOTES
12*   GFRAA  23*       Assessment/   Mr. Kailey Daniels is a 77year old male with PMHx of CAD, CHF, ESRD, COPD, atrial fibrillation, DM and HTN who presents with sob and hypoxia.     ESRD.  - On HD MWF via a [facility] TDC.     Acute Hypoxic Respiratory Failure in the setting of HCAP.  - On IV antibiotics per primary service.     CKD-MBD.  - Continue Hectorol.  - Monitor phosphorus.  - Renal diet.     Hyponatremia                                           ESBL Klebsiella UTI on meropenem                                              Plan/      HD MWF  New TW to be set if able to UF   Fluid restriction 1000 ml/day  Low na diet  Iv albumin prn and midodrine pre and mid HD for Intra dialytic hypotension  Scheduled midodrine 10 mg TID from 11/1, BP bit better    Please do not hesitate to contact me at [phone] if with questions. Thank you!     Melanie Ch MD  11/3/2018  [facility]

## 2018-11-04 NOTE — PROGRESS NOTES
Progress Note    Admit Date:  10/24/2018    Subjective:chf, ams   Mr. Janell Brown is drowsy ,wakes up at night . wife here at bedside . Objective:   /65   Pulse 71   Temp 97.1 °F (36.2 °C) (Oral)   Resp 18   Ht 5' 7\" (1.702 m)   Wt 193 lb 3.2 oz (87.6 kg)   SpO2 96%   BMI 30.26 kg/m²       Intake/Output Summary (Last 24 hours) at 11/04/18 1238  Last data filed at 11/04/18 1013   Gross per 24 hour   Intake              470 ml   Output                0 ml   Net              470 ml       Physical Exam:  General: very drowsy,wakes up off and on ,NAD  Skin:  Warm and dry  Neck:  No JVP  Cardiovascular:  RRR S1S2   Chest:   diminished b.s. No rales, no rhonchi   Abdomen:  Soft non tender BS present,   Extremities:  1 + edema legs and arms   Neuro:speech wnl. Very drowsy       Medications:   Scheduled Meds:   QUEtiapine  12.5 mg Oral Nightly    midodrine  10 mg Oral TID WC    levalbuterol  1.25 mg Nebulization TID    senna  2 tablet Oral BID    collagenase   Topical Daily    meropenem  500 mg Intravenous Q24H    b complex-C-folic acid  1 capsule Oral Daily    warfarin (COUMADIN) daily dosing (placeholder)   Other RX Placeholder    amiodarone  100 mg Oral Daily    dorzolamide  1 drop Left Eye BID    latanoprost  1 drop Both Eyes Nightly    levothyroxine  25 mcg Oral Daily    sodium chloride flush  10 mL Intravenous 2 times per day       Continuous Infusions:   dextrose         Data:    CBC   Recent Labs      11/02/18 0418 11/03/18 0435 11/04/18   0519   WBC  7.4  7.2  8.6   HGB  12.9*  12.2*  13.5   HCT  39.5*  37.6*  41.6   PLT  111*  125*  132*      RENAL  Recent Labs      11/02/18 0418   NA  130*   K  4.8   CL  99   CO2  22   PHOS  4.1   BUN  26*   CREATININE  3.7*     LFT'S  No results for input(s): AST, ALT, ALB, BILIDIR, BILITOT, ALKPHOS in the last 72 hours.   COAG  Recent Labs      11/02/18 0418 11/03/18 0435 11/04/18   0519   INR  1.81*  1.82*  1.91*     CARDIAC ENZYMES  No results for input(s): CKTOTAL, CKMB, CKMBINDEX, TROPONINI in the last 72 hours. U/A:    Lab Results   Component Value Date    NITRITE neg 05/17/2013    COLORU BROWN 10/25/2018    WBCUA >100 10/25/2018    RBCUA see below 10/25/2018    MUCUS 1+ 03/12/2018    BACTERIA 3+ 10/25/2018    CLARITYU TURBID 10/25/2018    SPECGRAV 1.025 10/25/2018    LEUKOCYTESUR LARGE 10/25/2018    BLOODU LARGE 10/25/2018    GLUCOSEU Negative 10/25/2018    AMORPHOUS Rare 03/12/2018   CXR   Impression:        Increasing perihilar and bibasilar opacities, favored to represent worsening  pulmonary edema.  Superimposed infection is a possibility in the appropriate  clinical setting. Bilateral pleural effusions, increased on the left. Collected: 10/25/18 0629   Order Status: Completed Specimen: Urine, clean catch Updated: 10/28/18 8497    Urine Culture, Routine --    Organism Klebsiella pneumoniae ESBL (A)    Urine Culture, Routine -- (A)    >100,000 CFU/ml   CONTACT PRECAUTIONS INDICATED   Carbapenem antibiotics are the best option for infections caused   by ESBL-producing organisms.  Other antibiotic classes are   likely to result in treatment failure, even for organisms   demonstrating in vitro susceptibility         Assessment:  Active Problems:    Acute on chronic systolic (congestive) heart failure (HCC)    HCAP (healthcare-associated pneumonia)    Acute on chronic respiratory failure with hypoxia (HCC)    Gram-negative pneumonia ([facility] [address]  Resolved Problems:    * No resolved hospital problems. *      Plan:   On seroquel 12.5 mg q hs   proamatine 10 mg daily   Low salt diet   Nephrology following    Iv merrem on h.d.d# 8since 1 st dose   On coumadin ,  Dc plan to home on 02 at home         [facility], MD 11/4/2018 12:38 PM

## 2018-11-05 NOTE — CONSULTS
[facility] Nurse  Consult Note       NAME:  Miriam King  MEDICAL RECORD NUMBER:  [id number]  AGE: 77 y.o. GENDER: male  : 1940  TODAY'S DATE:  2018    Subjective   Reason for WOCN Evaluation and Assessment: Request by Nursing to consult on patient with Bilateral Heel Pressure Injuries. Miriam King is a 77 y.o. male referred by:   [] Physician  [x] Nursing  [] Other:     Wound Identification:  Wound Type: pressure  Contributing Factors: edema, venous stasis, diabetes, chronic pressure, decreased mobility, shear force, decreased tissue oxygenation, malnutrition, incontinence of stool and incontinence of urine    Wound History: Patient familiar to writer from consultation in 2018 for same Unstageable bilateral heel Pressure Injuries that were covered with eschar at that time. Current Wound Care Treatment:  Cleanse with NSS, Apply Santyl enzymatis debriding ointment to wounds, cover with Alginate with AG and Foam dressing, off-load with boots while in bed or chair.     Patient Goal of Care:  [x] Wound Healing  [] Odor Control  [] Palliative Care  [] Pain Control   [] Other:         PAST MEDICAL HISTORY        Diagnosis Date    Acute non Q wave MI (myocardial infarction), initial episode of care ([facility] [address] 08    Anemia of chronic disease     Blood transfusion     CAD (coronary artery disease)     Carotid occlusion, right     CHF (congestive heart failure) (HCC)     Chronic atrial fibrillation ([address]     Closed fracture of proximal end of right fibula 6/3/2016    COPD exacerbation ([facility] [address] 2013    Diabetes mellitus ([facility] [address]     ESBL (extended spectrum beta-lactamase) producing bacteria infection 10/25/2018    urine    ESRD (end stage renal disease) on dialysis ([facility] [address]     MWF    Glaucoma 2012    Eye surgery this 1 10 2012 and 2012    Hemodialysis patient [facility])     M-W-F    History of blood transfusion     with cabg    Hypertension resolved    Lung disease     COPD    Mitral regurgitation     mild - moderate    Pneumonia 2009    Pneumonia     Pulmonary nodule     Skin cancer     nose    Squamous cell carcinoma lung, right ([address] 4/4/2018    Thyroid disease        PAST SURGICAL HISTORY    Past Surgical History:   Procedure Laterality Date    ABDOMEN SURGERY  04/05/2018    peritoneal dialysis catheter inserted    AORTIC VALVE REPLACEMENT  4/14/08    bioprosthesis      CATARACT REMOVAL      left    CATARACT REMOVAL Bilateral     COLONOSCOPY  2/19/13    RECTAL, SIGMOID AND TRANSVERSE POLYPS    CORONARY ARTERY BYPASS GRAFT      5 vessel    HERNIA REPAIR Left     inguinal    MOHS SURGERY      nose    OTHER SURGICAL HISTORY  04/27/2018    laparoscopic repositioning of peritoneal dialysis catheter    IA AV ANAST,UP ARM BASILIC VEIN TRANSPOSIT Left 8/20/2018    LEFT BRACHIAL BASILIC ARTERIOVENOUS FISTULA, LEFT ARM performed by Nette Chase MD at [address] ANAST,UP ARM BASILIC VEIN TRANSPOSIT Left 9/27/2018    SECOND STAGE LEFT UPPER EXTREMITY BRACHIAL ARTERY TO BASILIC VEIN ARTERIAL VENOUS FISTULA performed by Nette Chase MD at [address] HISTORY    Family History   Problem Relation Age of Onset    Hypertension Mother     Emphysema Mother     Heart Disease Mother     High Blood Pressure Mother     Cancer Father     Emphysema Sister     Cancer Sister     Hypertension Daughter     Hypertension Son     Diabetes Neg Hx     Asthma Neg Hx     Heart Failure Neg Hx        SOCIAL HISTORY    Social History   Substance Use Topics    Smoking status: Former Smoker     Packs/day: 2.00     Years: 46.00     Types: Cigarettes     Quit date: 4/1/2008    Smokeless tobacco: Never Used    Alcohol use No       ALLERGIES    No Known Allergies    MEDICATIONS    No current facility-administered medications on file prior to encounter.       Current Outpatient Prescriptions on File Prior to Encounter LABS:  WBC:    Lab Results   Component Value Date    WBC 8.5 11/05/2018     H/H:    Lab Results   Component Value Date    HGB 12.9 11/05/2018    HCT 39.6 11/05/2018     PTT:    Lab Results   Component Value Date    APTT 40.4 09/27/2018   [APTT}  PT/INR:    Lab Results   Component Value Date    PROTIME 26.7 11/05/2018    PROTIME 19.1 03/15/2013    INR 2.34 11/05/2018     HgBA1c:    Lab Results   Component Value Date    LABA1C 7.3 05/09/2018       Assessment   Jono Risk Score: Jono Scale Score: 13    Patient Active Problem List   Diagnosis Code    COPD (chronic obstructive pulmonary disease) ([facility] [address] J44.9    Mass of upper lobe of right lung R91.8    S/P AVR (aortic valve replacement) Z95.2    Status post coronary artery bypass graft Z95.1    Essential hypertension I10    Cardiomyopathy ([facility] [address] I42.9    Glaucoma H40.9    CKD (chronic kidney disease), stage III ([facility] [address] N18.3    CHF with cardiomyopathy ([facility] [address] I50.9, I42.9    Pneumonia J18.9    COPD exacerbation ([facility] [address] J44.1    Chronic kidney disease (CKD), stage III (moderate) ([facility]) N18.3    A-fib ([facility] [address] I48.91    Acute respiratory failure ([facility] [address] J96.00    Gram-negative pneumonia ([facility] J15.6    Trochanteric bursitis of left hip M70.62    Congestive heart failure (HCC) I50.9    Closed fracture of proximal end of right fibula S82.831A    Acute on chronic systolic CHF (congestive heart failure) ([facility]) I50.23    Acute renal failure superimposed on chronic kidney disease (HCC) N17.9, N18.9    Acute respiratory distress R06.03    Coronary artery disease involving native heart without angina pectoris I25.10    Ischemic cardiomyopathy I25.5    Diabetic ulcer of left foot associated with type 2 diabetes mellitus ([facility] [address] E11.621, L97.529    Combined congestive systolic and diastolic heart failure (HCC) I50.40    Anticoagulated on Coumadin Z51.81, Z79.01    Pulmonary nodule R91.1    Iatrogenic pneumothorax J95.811    Acute renal failure superimposed on chronic kidney disease ([facility] N17.9, N18.9    Squamous cell carcinoma lung, right ([facility]) C34.91    Cardiac left ventricular ejection fraction 30-35 percent R94.30    Encounter regarding vascular access for dialysis for ESRD ([facility] N18.6, Z99.2    Other mechanical complication of intraperitoneal dialysis catheter T85.691A    Moderate malnutrition ([facility]) E44.0    Chronic combined systolic and diastolic congestive heart failure ([facility]) I50.42    Cardiac left ventricular ejection fraction 30-35 percent R94.30    Decubitus ulcer of right heel, unstageable ([facility]) L89.610    Acute on chronic systolic (congestive) heart failure ([facility]) I50.23    HCAP (healthcare-associated pneumonia) J18.9    Acute on chronic respiratory failure with hypoxia ([facility]) J96.21    Gram-negative pneumonia ([facility]) J15.6       Measurements:      Wound 10/24/18 Heel Left;Medial (Active)   Wound Image   11/5/2018 12:40 PM   Wound Type Wound 11/5/2018  1:06 PM   Wound Pressure Stage  3 11/5/2018 12:40 PM   Dressing Status Changed 11/5/2018  1:06 PM   Dressing Changed Changed/New 11/5/2018  1:06 PM   Dressing/Treatment Alginate with Ag; Foam 11/5/2018  1:06 PM   Wound Cleansed Rinsed/Irrigated with saline 11/5/2018 12:40 PM   Dressing Change Due 11/06/18 11/5/2018  1:06 PM   Wound Length (cm) 2 cm 11/5/2018 12:40 PM   Wound Width (cm) 1.2 cm 11/5/2018 12:40 PM   Wound Depth (cm)  0.3 11/5/2018 12:40 PM   Calculated Wound Size (cm^2) (l*w) 2.4 cm^2 11/5/2018 12:40 PM   Change in Wound Size % (l*w) -71.43 11/5/2018 12:40 PM   Wound Assessment Pink;Yellow 11/5/2018  1:06 PM   Drainage Amount Small 11/5/2018  1:06 PM   Drainage Description Serosanguinous; Yellow 11/5/2018  1:06 PM   Odor Mild 11/5/2018  1:06 PM   Margins Defined edges; Attached edges 11/5/2018 12:40 PM   [place]-wound Assessment Intact 11/5/2018 12:40 PM   [place]%Wound Bed 75 11/5/2018 12:40 PM   Red%Wound Bed 10 11/5/2018 12:40 PM   Black%Wound Bed 15 11/5/2018 12:40 PM   Number of days: 11 Wound 10/24/18 Heel Distal (Active)   Wound Type Wound 10/24/2018 11:54 PM   Number of days: 11       Wound 10/24/18 Heel Right (Active)   Wound Image   11/5/2018 12:40 PM   Wound Type Wound 11/5/2018  1:06 PM   Wound Unstageable 11/5/2018 12:40 PM   Dressing Status Clean;Dry; Intact 11/5/2018  1:06 PM   Dressing Changed Changed/New 11/5/2018  1:06 PM   Dressing/Treatment Alginate with Ag; Foam 11/5/2018  1:06 PM   Wound Cleansed Rinsed/Irrigated with saline 11/5/2018 12:40 PM   Dressing Change Due 11/06/18 11/5/2018  1:06 PM   Wound Length (cm) 6 cm 11/5/2018 12:40 PM   Wound Width (cm) 3 cm 11/5/2018 12:40 PM   Wound Depth (cm)  0.5 11/5/2018 12:40 PM   Calculated Wound Size (cm^2) (l*w) 18 cm^2 11/5/2018 12:40 PM   Change in Wound Size % (l*w) -965.09 11/5/2018 12:40 PM   Wound Assessment Black; Brown;Pink;Yellow 11/5/2018  1:06 PM   Drainage Amount Small 11/5/2018  1:06 PM   Drainage Description Yellow;Serosanguinous 11/5/2018  1:06 PM   Odor Mild 11/5/2018  1:06 PM   Margins Defined edges; Unattached edges 11/5/2018 12:40 PM   [place]-wound Assessment Intact 11/5/2018 12:40 PM   [place]%Wound Bed 10 11/5/2018 12:40 PM   Yellow%Wound Bed 10 11/5/2018 12:40 PM   Black%Wound Bed 80 11/5/2018 12:40 PM   Other%Wound Bed 5 10/30/2018  4:40 PM   Number of days: 11       Wound 10/24/18 Sacrum (Active)   Wound Image   11/5/2018 12:40 PM   Wound Type Wound 11/5/2018  1:06 PM   Wound Other 11/5/2018 12:40 PM   Dressing Status Clean;Dry; Intact 11/5/2018  1:06 PM   Dressing Changed Changed/New 11/5/2018  1:06 PM   Dressing/Treatment Foam 11/5/2018  1:06 PM   Wound Cleansed Rinsed/Irrigated with saline 11/5/2018 12:40 PM   Dressing Change Due 11/06/18 11/5/2018  1:06 PM   Wound Length (cm) 6 cm 10/24/2018 11:54 PM   Wound Width (cm) 3 cm 10/24/2018 11:54 PM   Wound Depth (cm)  0 10/24/2018 11:54 PM   Calculated Wound Size (cm^2) (l*w) 18 cm^2 10/24/2018 11:54 PM   Wound Assessment Intact; Light purple; Other (Comment) 11/5/2018 12:40 PM   Drainage Amount None 11/5/2018 12:40 PM   Odor None 10/24/2018 11:54 PM   [place]%Wound Bed 100 11/5/2018 12:40 PM   Number of days: 11       Wound 10/24/18 Buttocks Mid;Right (Active)   Wound Type Wound 11/5/2018  1:06 PM   Wound Pressure Stage  2 11/3/2018  9:00 AM   Dressing Status Clean;Dry; Intact 11/5/2018  1:06 PM   Dressing Changed Changed/New 11/5/2018  1:06 PM   Dressing/Treatment Foam 11/5/2018  1:06 PM   Wound Cleansed Other (Comment) 11/3/2018 12:25 AM   Dressing Change Due 11/06/18 11/5/2018  1:06 PM   Wound Length (cm) 8 cm 10/24/2018 11:54 PM   Wound Width (cm) 0.4 cm 10/24/2018 11:54 PM   Wound Depth (cm)  0 10/24/2018 11:54 PM   Calculated Wound Size (cm^2) (l*w) 3.2 cm^2 10/24/2018 11:54 PM   Wound Assessment Clean;Dry; Intact;Fragile 10/24/2018 11:54 PM   Drainage Amount None 10/24/2018 11:54 PM   Culture Taken No 11/3/2018 12:25 AM   Number of days: 11       Wound 10/29/18 Other (Comment) Back Left;Medial Skin shearing below left scapula (Active)   Dressing Status Clean;Dry; Intact; Old drainage 11/5/2018  1:06 PM   Dressing Changed Changed/New 11/5/2018  1:06 PM   Dressing/Treatment Foam 11/5/2018  1:06 PM   Dressing Change Due 11/06/18 11/5/2018  1:06 PM   Number of days: 7       Wound 10/29/18 Skin tear Arm Right;Upper Skin tear Right Forearm  (Active)   Wound Skin Tear 11/3/2018  9:00 AM   Dressing Status Clean;Dry; Intact 11/3/2018  8:00 PM   Dressing Changed Changed/New 11/5/2018  1:06 PM   Dressing/Treatment Non adherent; Other (Comment); Foam 11/5/2018  1:06 PM   Wound Cleansed Not Cleansed 10/30/2018  4:40 PM   Dressing Change Due 11/06/18 11/5/2018  1:06 PM   Number of days: 7       Wound 11/03/18 Other (Comment) Heel Left;Lateral left heel pressure injury divided into 2 wounds with skin bridge (Active)   Wound Type Wound 11/5/2018 12:40 PM   Wound Pressure Stage  3 11/5/2018 12:40 PM   Dressing Status Clean;Dry; Intact; Changed 11/5/2018 12:40 PM   Dressing Changed Changed/New 11/5/2018 12:40 PM   Dressing/Treatment Pharmaceutical agent (see eMar); Alginate with Ag; Foam 11/5/2018 12:40 PM   Wound Cleansed Rinsed/Irrigated with saline 11/5/2018 12:40 PM   Dressing Change Due 11/06/18 11/5/2018 12:40 PM   Wound Length (cm) 1.5 cm 11/5/2018 12:40 PM   Wound Width (cm) 1 cm 11/5/2018 12:40 PM   Wound Depth (cm)  0.3 11/5/2018 12:40 PM   Calculated Wound Size (cm^2) (l*w) 1.5 cm^2 11/5/2018 12:40 PM   Wound Assessment Pink;Yellow;Brown 11/5/2018 12:40 PM   Drainage Amount Small 11/5/2018 12:40 PM   Drainage Description Serosanguinous 11/5/2018 12:40 PM   Margins Attached edges; Undefined edges 11/5/2018 12:40 PM   [place]-wound Assessment Intact 11/5/2018 12:40 PM   [place]%Wound Bed 75 11/5/2018 12:40 PM   [place]ellow%Wound Bed 10 11/5/2018 12:40 PM   Black%Wound Bed 15 11/5/2018 12:40 PM   Number of days: 2       Incision 08/20/18 Arm Left; Other (Comment) (Active)   Wound Assessment Intact;Drainage 11/1/2018  9:00 AM   Closure Other (Comment) 10/26/2018  4:15 PM   Drainage Amount Small 10/27/2018  9:16 AM   Odor None 10/27/2018  9:16 AM   Dressing/Treatment Foam 11/3/2018  9:00 AM   Dressing Changed Dressing reinforced 10/27/2018  9:16 AM   Dressing Status Clean;Dry; Intact 11/3/2018  8:00 PM   Dressing Change Due 11/04/18 11/3/2018  9:00 AM   Number of days: 77       Response to treatment:  Well tolerated by patient.        Plan   Plan of Care: Wound 10/29/18 Other (Comment) Back Left;Medial Skin shearing below left scapula-Dressing/Treatment: Foam  Wound 10/29/18 Skin tear Arm Right;Upper Skin tear Right Forearm -Dressing/Treatment: Non adherent, Other (Comment), Foam (mepitel and mepilex)  Wound 11/03/18 Other (Comment) Heel Left;Lateral left heel pressure injury divided into 2 wounds with skin bridge-Dressing/Treatment: Pharmaceutical agent (see eMar), Alginate with Ag, Foam  Wound 10/24/18 Heel Left;Medial-Dressing/Treatment: Alginate with Ag, Foam  [REMOVED] Wound 05/10/18 Buttocks Left-Dressing/Treatment:

## 2018-11-05 NOTE — CARE COORDINATION
INTERDISCIPLINARY PLAN OF CARE CONFERENCE    Date/Time: 11/5/2018 2:40 PM  Completed by: Nolvia Luciano Case Management      Patient Name:  Esteban Cortez  YOB: 1940  Admitting Diagnosis: Acute on chronic systolic (congestive) heart failure ([facility] [address] [I50.23]     Admit Date/Time:  10/24/2018  4:24 PM    Chart reviewed. Interdisciplinary team met to discuss patient progress and discharge plans. Disciplines included Case Management, Nursing, and Dietitian. Current Status:Stable    PT/OT recommendation:TBD    Anticipated Discharge Date: TBD  Expected D/C Disposition:  Home  Confirmed plan with patient and/or family Yes  Discharge Plan Comments: Reviewed chart. Met with pt's spouse at bedside. Plan continues home with spouse and granddaughter with [facility] for SN,+eCOC. Pt active with [facility], pt active with [facility] for HD mwf. Following. [id number] Spouse requests [facility] for transport home.       Home O2 in place on admit: No  Pt informed of need to bring portable home O2 tank on day of discharge for nursing to connect prior to leaving:  Not Indicated  Verbalized agreement/Understanding:  Not Indicated

## 2018-11-06 PROBLEM — E44.1 MILD PROTEIN-CALORIE MALNUTRITION (HCC): Chronic | Status: ACTIVE | Noted: 2018-01-01

## 2018-11-06 NOTE — PLAN OF CARE
Problem: Falls - Risk of:  Goal: Will remain free from falls  Will remain free from falls   Outcome: Ongoing      Problem: Risk for Impaired Skin Integrity  Goal: Tissue integrity - skin and mucous membranes  Structural intactness and normal physiological function of skin and  mucous membranes.    Outcome: Ongoing      Problem: OXYGENATION/RESPIRATORY FUNCTION  Goal: Patient will maintain patent airway  Outcome: Ongoing
Problem: Falls - Risk of:  Goal: Will remain free from falls  Will remain free from falls   Outcome: Ongoing  Pt educated to use call light when needing assistance with staff. Problem: OXYGENATION/RESPIRATORY FUNCTION  Goal: Patient will maintain patent airway  Outcome: Ongoing    Goal: Patient will achieve/maintain normal respiratory rate/effort  Respiratory rate and effort will be within normal limits for the patient   Outcome: Ongoing      Comments:   Patient's EF (Ejection Fraction) is less than 40%    Patient has a past medical history of Acute non Q wave MI (myocardial infarction), initial episode of care [facility]); Anemia of chronic disease; Blood transfusion; CAD (coronary artery disease); Carotid occlusion, right; CHF (congestive heart failure) ([facility] [facility] ([facility] Closed fracture of proximal end of right fibula; COPD exacerbation ([facility] Diabetes mellitus ([facility] ESBL (extended spectrum beta-lactamase) producing bacteria infection; ESRD (end stage renal disease) on dialysis ([facility] Glaucoma; Hemodialysis patient [facility]); History of blood transfusion; Hypertension; Lung disease; Mitral regurgitation; Pneumonia; Pneumonia; Pulmonary nodule; Skin cancer; Squamous cell carcinoma lung, right ([facility] and Thyroid disease. Comorbidities reviewed and education provided. Patient and family's stated goal of care: reduce shortness of breath prior to discharge    Patient's current functional capacity:  Marked limitation of physical activity. Comfortable at rest. Less than ordinary activity causes fatigue, palpitation, or dyspnea. Pt resting in bed at this time on 4 L O2. Pt with complaints of shortness of breath. Pt without lower extremity edema.  Patient's weights and intake/output reviewed:    Patient Vitals for the past 96 hrs (Last 3 readings):   Weight   10/31/18 0330 174 lb 12.8 oz (79.3 kg)   10/30/18 0536 174 lb 13.2 oz (79.3 kg)   10/29/18 1135 173 lb 1 oz (78.5 kg)       Intake/Output
Problem: Nutrition  Goal: Optimal nutrition therapy  Outcome: Ongoing  Nutrition Problem: Increased nutrient needs  Intervention: Food and/or Nutrient Delivery: Continue current diet, Continue current ONS  Nutritional Goals: Pt will consume >50% of PO intake (Carb Control, Low Sodium, Dental Soft, Fluid Restriction 1000 mL diet order) at each meal, 3x/day; pt will consume >50% of Renal ONS 1x/day minimum
3 readings):   Weight   10/29/18 0356 182 lb 1.6 oz (82.6 kg)   10/28/18 0225 174 lb 1.6 oz (79 kg)   10/27/18 0316 176 lb 5.9 oz (80 kg)       Intake/Output Summary (Last 24 hours) at 10/29/18 0814  Last data filed at 10/29/18 0356   Gross per 24 hour   Intake              480 ml   Output                0 ml   Net              480 ml       Patient provided with education on CHF signs/symptoms, causes, discharge medications, daily weights, low sodium diet, activity, and follow-up. Notified patient to call the doctor post discharge if patient experiences shortness of breath, chest pain, swelling, cough, or weight gain of three pounds in a day/five pounds in a week. Also notified patient to call the doctor with dizziness, increased fatigue, decreased or difficulty urinating. Pt education needs reinforcement. No additional questions at this time.     Education Time: 25 Minutes
Ongoing      Problem: FLUID AND ELECTROLYTE IMBALANCE  Goal: Fluid and electrolyte balance are achieved/maintained  Outcome: Ongoing      Problem: ACTIVITY INTOLERANCE/IMPAIRED MOBILITY  Goal: Mobility/activity is maintained at optimum level for patient  Outcome: Ongoing
discharge if patient experiences shortness of breath, chest pain, swelling, cough, or weight gain of three pounds in a day/five pounds in a week. Also notified patient to call the doctor with dizziness, increased fatigue, decreased or difficulty urinating. Pt with no evidence of learning. No additional questions at this time.     Education Time: 6 Minutes
lb 4.8 oz (80 kg)       Intake/Output Summary (Last 24 hours) at 11/06/18 0514  Last data filed at 11/05/18 1748   Gross per 24 hour   Intake              990 ml   Output             1800 ml   Net             -810 ml       Patient provided with education on CHF signs/symptoms, causes, discharge medications, daily weights, low sodium diet, activity, and follow-up. Notified patient to call the doctor post discharge if patient experiences shortness of breath, chest pain, swelling, cough, or weight gain of three pounds in a day/five pounds in a week. Also notified patient to call the doctor with dizziness, increased fatigue, decreased or difficulty urinating. Pt education needs reinforcement. No additional questions at this time.     Education Time: 10 Minutes    Problem: HEMODYNAMIC STATUS  Goal: Patient has stable vital signs and fluid balance  Outcome: Ongoing

## 2018-11-06 NOTE — PROGRESS NOTES
Progress Note    HISTORY     CC:  AMS, we are following for ESRD         Subjective/     Resting on bed    HD on 11/5, confused , BP stable and 1l UF, 78.1--> 77.1 kg  HD on 11/2, w 1.7 l UF, 79.1---> 76.4 kg  HD on 10/31, UF 1.2 l , 76.9-->75.7 kg;BP in the 80s w iv albumin  HD on 10/29 w 1.5 l UF, 80-->78.5 kg, with iv albumin and middodrine for BP    ROS:  Constitutional:  No fevers  Cardiovascular:  + edema   Respiratory:  No wheezing  Skin:  No rash, no itching  :  Not making much urine     Social Hx:    - No visitor     Past Medical and Surgical History:  - Reviewed, no changes     EXAM       Objective/     Vitals:    11/06/18 1135 11/06/18 1330 11/06/18 1350 11/06/18 1500   BP: (!) 142/65 103/67  132/75   Pulse: 73   73   Resp: 16   16   Temp: 96.8 °F (36 °C)   96.3 °F (35.7 °C)   TempSrc: Axillary   Oral   SpO2: 95%  93% 97%   Weight:       Height:         24HR INTAKE/OUTPUT:      Intake/Output Summary (Last 24 hours) at 11/06/18 1620  Last data filed at 11/06/18 1330   Gross per 24 hour   Intake              250 ml   Output                0 ml   Net              250 ml     Constitutional: sleepy  Eyes:  Pupils reactive, sclera clear   Neck:  Normal thyroid, no masses   Cardiovascular:  Regular, no rub  Respiratory:  No distress, no wheezing, crackles left > right   Psychiatry:  Flat affect, confused and somnolent   Abdomen: +bs, soft, nt, no masses   Musculoskeletal: No LE edema, no clubbing   Lymphatics:  No LAD in neck, no supraclavicular nodes   Access:  Right [facility]       MEDICAL DECISION MAKING       Data/  Recent Labs      11/04/18   0519  11/05/18   0416  11/06/18   0444   WBC  8.6  8.5  6.8   HGB  13.5  12.9*  12.1*   HCT  41.6  39.6*  36.9*   MCV  96.3  95.0  94.9   PLT  132*  136  119*     Recent Labs      11/05/18   0416   NA  133*   K  5.9*   CL  99   CO2  23   GLUCOSE  233*   PHOS  4.0   BUN  41*   CREATININE  4.6*   LABGLOM  12*   GFRAA  15*       Assessment/   Mr. Delfina Dahl is a 77

## 2018-11-06 NOTE — PROGRESS NOTES
Shift assessment complete- see flow sheet. Patient is alert and oriented to person but disoriented otherwise. Per family patient is at baseline.     Patient is currently on 2 L NC. SpO2 WNL. Lung sounds diminished. Patient denies any shortness of breath.      Heart rhythm Afib on monitor.      Bowel sounds active x4. Abdomen is soft and non tender.      No new skin issues noted. +1 pitting edema present to BLE. Scrotal edema present.     Patient denies any pain at this time. Call light explained and in reach. Telesitter at bedside for patient safety. Bed alarm set. Will continue to monitor. HUGO McclellandN, RN.

## 2018-11-06 NOTE — FLOWSHEET NOTE
10/29/18 0740 10/29/18 1135   Vital Signs   BP (!) 104/48 (!) 108/57   Temp 97.7 °F (36.5 °C) 97.7 °F (36.5 °C)   Pulse 72 75   Weight 176 lb 5.9 oz (80 kg) 173 lb 1 oz (78.5 kg)   Weight Method Bed scale Bed scale   Percent Weight Change -3.15 -1.87   Dry Weight 171 lb 1.2 oz (77.6 kg) 171 lb 1.2 oz (77.6 kg)   Treatment time: 3.5 hours  Net UF: 1500 ml     Pre weight: 80 kg   Post weight: 78.5 kg  EDW: 77.5 kg     Access used: [facility] CVC  Access function: good with  ml/min     Medications or blood products given: midodrine, albumin, heparin dwells     Regular outpatient schedule: MWF     Summary of response to treatment: pt completed treatment. pt confused, yelling out and trying to get out of bed. RN notified and soft restraints put on pt. pt continued to yell out. VSS. pt received midodrine, albumin x2. CVC dwelled with heparin and capped. 1.5L fluid removal   Copy of dialysis   treatment record placed in chart, to be scanned into EMR.
11/05/18 0559   Height and Weight   Weight 176 lb 4.8 oz (80 kg)   Weight Method Actual;Bed scale   BMI (Calculated) 27.7       Oxygen tank found on bed, possibly affected previous weight.
Clean;Dry; Intact   Dressing Changed Dressing reinforced   Dressing/Treatment Foam   Dressing Change Due 11/07/18   Wound 10/29/18 Skin tear Arm Right;Upper Skin tear Right Forearm    Date First Assessed/Time First Assessed: 10/29/18 1402   Wound Type: Skin tear  Location: Arm  Wound Location Orientation: Right;Upper  Wound Description (Comments): Skin tear Right Forearm    Wound Skin Tear   Dressing Status Clean;Dry; Intact   Dressing Changed Dressing reinforced   Dressing/Treatment Foam   Dressing Change Due 11/07/18   Wound 11/03/18 Other (Comment) Heel Left;Lateral left heel pressure injury divided into 2 wounds with skin bridge   Date First Assessed: 11/03/18   Wound Type: (c) Other (Comment)  Wound Event: Pressure  Location: Heel  Wound Location Orientation: Left;Lateral  Wound Description (Comments): left heel pressure injury divided into 2 wounds with skin bridge  Pre-exist... Wound Type Wound   Wound Pressure Stage  3   Dressing Status Clean;Dry; Intact; Changed     Wound dressings changed and as shown above.
Right; Upper  Wound Description (Comments): Skin tear Right Forearm    Dressing Changed Changed/New   Dressing/Treatment Non adherent; Other (Comment); Foam  (mepitel and mepilex)   Dressing Change Due 11/06/18     Wound dressings changed with this RN and Clemente Colin, RN- wound care nurse.

## 2018-11-06 NOTE — PROGRESS NOTES
Progress Note    Admit Date:  10/24/2018    Subjective:chf, ams   Mr. Delfina Dahl is very alert, feels better, oriented, speech better, wife at bed side . Objective:   /67   Pulse 73   Temp 96.8 °F (36 °C) (Axillary)   Resp 16   Ht 5' 7\" (1.702 m)   Wt 170 lb (77.1 kg)   SpO2 93%   BMI 26.63 kg/m²       Intake/Output Summary (Last 24 hours) at 11/06/18 1451  Last data filed at 11/06/18 1330   Gross per 24 hour   Intake              250 ml   Output                0 ml   Net              250 ml       Physical Exam:  General:alert and orientedx 3 NAD  Skin:  Warm and dry  Neck:  No JVP  Cardiovascular:  RRR S1S2   Chest:   diminished b.s. No rales, no rhonchi   Abdomen:  Soft non tender BS present,   Extremities:  Slight  edema legs and arms ,less now   Neuro:speech wnl. Medications:   Scheduled Meds:   warfarin  2 mg Oral Once    QUEtiapine  12.5 mg Oral Nightly    midodrine  10 mg Oral TID WC    levalbuterol  1.25 mg Nebulization TID    senna  2 tablet Oral BID    collagenase   Topical Daily    meropenem  500 mg Intravenous Q24H    b complex-C-folic acid  1 capsule Oral Daily    warfarin (COUMADIN) daily dosing (placeholder)   Other RX Placeholder    amiodarone  100 mg Oral Daily    dorzolamide  1 drop Left Eye BID    latanoprost  1 drop Both Eyes Nightly    levothyroxine  25 mcg Oral Daily    sodium chloride flush  10 mL Intravenous 2 times per day       Continuous Infusions:   dextrose         Data:    CBC   Recent Labs      11/04/18 0519 11/05/18 0416 11/06/18 0444   WBC  8.6  8.5  6.8   HGB  13.5  12.9*  12.1*   HCT  41.6  39.6*  36.9*   PLT  132*  136  119*      RENAL  Recent Labs      11/05/18 0416   NA  133*   K  5.9*   CL  99   CO2  23   PHOS  4.0   BUN  41*   CREATININE  4.6*     LFT'S  No results for input(s): AST, ALT, ALB, BILIDIR, BILITOT, ALKPHOS in the last 72 hours.   COAG  Recent Labs      11/04/18 0519 11/05/18 0416 11/06/18 0445   INR  1.91*  2.34*

## 2018-11-06 NOTE — CARE COORDINATION
DISCHARGE ORDER  Date/Time 2018 3:19 PM  Completed by: Zeke Manzano, Case Management    Patient Name: Alice Lara    : 1940  Admitting Diagnosis: Acute on chronic systolic (congestive) heart failure ([facility] [address] [I50.23]  Admit Date/Time: 10/24/2018  4:24 PM    Noted discharge order. Confirmed discharge plan with patient / family (wife): Yes   Discharge Plan: Order for dc noted. Spoke with pt and wife who cont plan for return home Discussed need for home care and wife agreeable and chooses [facility]. [facility]. Also noted pt need for home O2 and wife agreeable and chooses [facility]. Spoke with Radha coelho [place] who will be in to arrange for home O2 needs. Spoke with [facility] who states can transport home and to call when pt ready, states will not set time but will be available to transport home tonite. No other dc needs identified.  Left VM at [facility] re: dc today

## 2018-11-06 NOTE — CARE COORDINATION
[facility] order noted, all docs needed have been faxed to [facility] for home care services.       Ron Soriano  Work mobile: [phone]  [facility] office: [phone]

## 2018-11-06 NOTE — PROGRESS NOTES
Pharmacy to Dose Warfarin     Dx: AVR; Afib  Goal INR range 2-3  Home Warfarin dose: 2 mg daily     Date                 INR                  Warfarin  11/6/2018        2.63                    2 mg      INR is therapeutic. Recommend Warfarin 2 mg tonight x1. Daily INR ordered. Rx will continue to manage therapy per consult order.      Prabjhot Coy, PharmD Candidate [id number]

## 2018-11-12 NOTE — TELEPHONE ENCOUNTER
3rd Attempt; No Answer- Left HIPAA compliant voicemail with Non-Urgent Heart Failure Resource Line number for call back.     Princess Pitts HF BSN-RN  Heart Failure Clinical Educator  [facility]  Laurence Mejia  [phone]

## 2018-11-23 PROBLEM — R41.82 ALTERED MENTAL STATUS: Status: ACTIVE | Noted: 2018-01-01

## 2018-11-23 NOTE — ED PROVIDER NOTES
GFR Non- 16 (*)     GFR  19 (*)     Total Protein 5.7 (*)     Alb 2.5 (*)     Albumin/Globulin Ratio 0.8 (*)     Total Bilirubin 1.6 (*)     Alkaline Phosphatase 135 (*)     All other components within normal limits    Narrative:     Performed at:  [facility],  [place] [facility]   Phone [phone]   CULTURE BLOOD #1   CULTURE BLOOD #1   LACTIC ACID, PLASMA    Narrative:     Performed at:  [place] [address],  [place], [facility]   Phone [phone]   URINALYSIS   PROTIME-INR   POCT GLUCOSE    Narrative:     Performed at:  [place] [address],  [place] [employer]   Phone [phone]     XR CHEST PORTABLE   Final Result   Heterogeneous bilateral airspace opacities. Apparent increase may reflect   increased atelectasis and bronchovascular crowding given differences in   inspiratory effort as opposed to worsening airspace disease.               Carleen Hall MD  11/23/18 1944

## 2018-11-23 NOTE — H&P
Admission [id number];  MRN: [id number] ;   Admit Date: 11/23/2018  5:18 AM   Current Date and Time: 11/23/2018 12:26 PM    PCP  --  Jorge Mcclure MD         Chief Complaint   Patient presents with    Constipation     Pt arrived via EMS with c/o constipation. Pt has not had BM in 4 days and is having abd pain this morning         HPI:  Patient is a 77 y.o. w/m is brought to ED with c/o severe constipation and stool impaction . He also has increased confusion per family the patient gets confused all the time ,but he has worsened today per  family .     No Known Allergies    Past Medical History:   Diagnosis Date    Acute non Q wave MI (myocardial infarction), initial episode of care ([facility] 04/01/08    Anemia of chronic disease     Blood transfusion     CAD (coronary artery disease)     Carotid occlusion, right     CHF (congestive heart failure) (HCC)     Chronic atrial fibrillation ([address]     Closed fracture of proximal end of right fibula 6/3/2016    COPD exacerbation ([address] 4/21/2013    Diabetes mellitus ([address]     ESBL (extended spectrum beta-lactamase) producing bacteria infection 10/25/2018    urine    ESRD (end stage renal disease) on dialysis ([address]     MWF    Glaucoma 2012    Eye surgery this 1 10 2012 and 1 17 2012    Hemod[facility] patient [facility])     M-W-F    History of blood transfusion 2008    with cabg    Hypertension     resolved    Lung disease     COPD    Mitral regurgitation     mild - moderate    Pneumonia 2009    Pneumonia     Pulmonary nodule     Skin cancer     nose    Squamous cell carcinoma lung, right ([address] 4/4/2018    Thyroid disease       Past Surgical History:   Procedure Laterality Date    ABDOMEN SURGERY  04/05/2018    peritoneal dialysis catheter inserted    AORTIC VALVE REPLACEMENT  4/14/08    bioprosthesis      CATARACT REMOVAL      left    CATARACT REMOVAL Bilateral     COLONOSCOPY  2/19/13    RECTAL, SIGMOID AND TRANSVERSE POLYPS    positioning. Cardiomediastinal contours are not substantially changed.  Postsurgical  changes of prior CABG and aortic valve replacement.  Bilateral heterogeneous  airspace disease, increased in the right upper lung and at the left lung  base.  No pneumothorax or sizable effusion.     Impression:       Heterogeneous bilateral airspace opacities.  Apparent increase may reflect  increased atelectasis and bronchovascular crowding given differences in  inspiratory effort as opposed to worsening airspace disease. ASSESMENT & PLAN:   Active Problems:    Altered mental status    Prothrombin time increased due to coumadin  Resolved Problems:    * No resolved hospital problems.  *  plan:   Admit to [place] [address]  Nephrology consult   Monitor cbc.bmp   Resume home meds   Ot/pt and up in chair     Diet:carb control   GI/DVT PX: on coumadin  CODE STATUS: full     Everardo Brennan MD 11/23/2018 12:26 PM

## 2018-11-23 NOTE — CONSULTS
tablet 3    dorzolamide (TRUSOPT) 2 % ophthalmic solution Place 1 drop into the left eye 2 times daily.  Insulin Glargine (LANTUS SC) Inject 12 Units into the skin nightly 12 to 18 units depending on blood sugar result         Allergies:  Patient has no known allergies. Social History:    Social History     Social History    Marital status:      Spouse name: N/A    Number of children: N/A    Years of education: N/A     Occupational History    Not on file.      Social History Main Topics    Smoking status: Former Smoker     Packs/day: 2.00     Years: 46.00     Types: Cigarettes     Quit date: 4/1/2008    Smokeless tobacco: Never Used    Alcohol use No    Drug use: No    Sexual activity: Yes     Partners: Female     Other Topics Concern    Not on file     Social History Narrative    No narrative on file       Family History:   Family History   Problem Relation Age of Onset    Hypertension Mother     Emphysema Mother     Heart Disease Mother     High Blood Pressure Mother     Cancer Father     Emphysema Sister     Cancer Sister     Hypertension Daughter     Hypertension Son     Diabetes Neg Hx     Asthma Neg Hx     Heart Failure Neg Hx        Review of Systems:   UTO    Physical exam:   Constitutional:  VITALS:  BP (!) 98/58   Pulse 73   Temp 96.7 °F (35.9 °C) (Oral)   Resp 18   Ht 5' 7\" (1.702 m)   Wt 170 lb 8 oz (77.3 kg)   SpO2 98%   BMI 26.70 kg/m²   Gen: alert, awake, confused   Skin: no rash, turgor wnl  Heent:  No Jvd  Neck: no bruits or jvd noted, thyroid normal  Cardiovascular:  S1, S2 without m/r/g  Respiratory: CTA B without w/r/r; respiratory effort normal  Abdomen:  +bs, soft, nt, nd, no hepatosplenomegaly: PD catheter in situ  Ext: 2+ lower extremity edema on his thighs and left upper ext  Neuro/Psy: AAoriented times 1; moves all 4 ext      Data/  Recent Labs      11/23/18   0710   WBC  7.1   HGB  12.9*   HCT  40.3*   MCV  96.1   PLT  149     Recent Labs 11/23/18   0710   NA  130*   K  3.7   CL  93*   CO2  25   GLUCOSE  112*   BUN  32*   CREATININE  3.7*   LABGLOM  16*   GFRAA  19*     CXR from 11/23/18  Impression   Heterogeneous bilateral airspace opacities.  Apparent increase may reflect   increased atelectasis and bronchovascular crowding given differences in   inspiratory effort as opposed to worsening airspace disease. Assessment  -ESRD on HD MWF  -Acute metabolic encephlopahty  -volume overload  -chronic Hypotension on midodrine  -Possible penumonia  -Malnutrition    Plan  -HD on 11/23 for 2h and then per MWF schedule  -extra UF session 11/24  -please do not give any fleets enema on dialysis pt d/t phos load  -renal dose meds      Thank you for the consultation. Please do not hesitate to call with questions.     Tyler Beltrán  [facility]  Office: [phone]  Fax:    [phone]

## 2018-11-23 NOTE — ED PROVIDER NOTES
K/uL    Eosinophils # 0.1 0.0 - 0.6 K/uL    Basophils # 0.0 0.0 - 0.2 K/uL   Comprehensive Metabolic Panel   Result Value Ref Range    Sodium 130 (L) 136 - 145 mmol/L    Potassium 3.7 3.5 - 5.1 mmol/L    Chloride 93 (L) 99 - 110 mmol/L    CO2 25 21 - 32 mmol/L    Anion Gap 12 3 - 16    Glucose 112 (H) 70 - 99 mg/dL    BUN 32 (H) 7 - 20 mg/dL    CREATININE 3.7 (H) 0.8 - 1.3 mg/dL    GFR Non- 16 (A) >60    GFR  19 (A) >60    Calcium 9.9 8.3 - 10.6 mg/dL    Total Protein 5.7 (L) 6.4 - 8.2 g/dL    Alb 2.5 (L) 3.4 - 5.0 g/dL    Albumin/Globulin Ratio 0.8 (L) 1.1 - 2.2    Total Bilirubin 1.6 (H) 0.0 - 1.0 mg/dL    Alkaline Phosphatase 135 (H) 40 - 129 U/L    ALT 16 10 - 40 U/L    AST 20 15 - 37 U/L    Globulin 3.2 g/dL   Lactic Acid, Plasma   Result Value Ref Range    Lactic Acid 1.2 0.4 - 2.0 mmol/L   Protime-INR   Result Value Ref Range    Protime 110.4 (H) 9.8 - 13.0 sec    INR 9.68 (HH) 0.86 - 1.14   Lactic acid, plasma   Result Value Ref Range    Lactic Acid 1.3 0.4 - 2.0 mmol/L   CBC   Result Value Ref Range    WBC 7.6 4.0 - 11.0 K/uL    RBC 4.16 (L) 4.20 - 5.90 M/uL    Hemoglobin 12.9 (L) 13.5 - 17.5 g/dL    Hematocrit 39.7 (L) 40.5 - 52.5 %    MCV 95.4 80.0 - 100.0 fL    MCH 31.0 26.0 - 34.0 pg    MCHC 32.5 31.0 - 36.0 g/dL    RDW 19.5 (H) 12.4 - 15.4 %    Platelets 872 634 - 040 K/uL    MPV 7.7 5.0 - 10.5 fL   Protime-INR   Result Value Ref Range    Protime 119.3 (H) 9.8 - 13.0 sec    INR 10.46 (HH) 0.86 - 1.14   Renal Function Panel   Result Value Ref Range    Sodium 135 (L) 136 - 145 mmol/L    Potassium 4.5 3.5 - 5.1 mmol/L    Chloride 100 99 - 110 mmol/L    CO2 24 21 - 32 mmol/L    Anion Gap 11 3 - 16    Glucose 156 (H) 70 - 99 mg/dL    BUN 23 (H) 7 - 20 mg/dL    CREATININE 3.4 (H) 0.8 - 1.3 mg/dL    GFR Non-African American 18 (A) >60    GFR  21 (A) >60    Calcium 9.1 8.3 - 10.6 mg/dL    Phosphorus 3.6 2.5 - 4.9 mg/dL    Alb 2.7 (L) 3.4 - 5.0 g/dL   Protime-INR with ECG of 24-OCT-2018 17:12,Wide QRS rhythm has replaced Atrial fibrillationConfirmed by Polo Alanis MD, Rebecca Alonzo ([id number]) on 11/23/2018 7:36:14 AM        Radiographs (if obtained):  [] Radiologist's Report Reviewed:  XR CHEST PORTABLE   Final Result   Heterogeneous bilateral airspace opacities. Apparent increase may reflect   increased atelectasis and bronchovascular crowding given differences in   inspiratory effort as opposed to worsening airspace disease. [] Discussed with Radiologist:     [] The following radiograph was interpreted by myself in the absence of a radiologist:     EKG (if obtained): (All EKG's are interpreted by myself in the absence of a cardiologist)  EKG demonstrates a left bundle branch block and unclear P axis but a regular rhythm with left axis deviation and no other acute ischemic changes identified heart rate of 67 and much like his previous EKG    MDM:   Patient with constipation presents to the ER for evaluation. He did have a fecal impaction which has been disimpacted. Please also had intermittent fevers according to his wife and his recent pneumonia seems to be worse on chest x-ray. Patient did seem to be improving from his pneumonia currently on antibiotics. His labs are pending and will be checked out to Dr. Vivienne Espana but is anticipated that he'll be admitted for further evaluation. He will also need dialysis today. Final Impression:  1. Confusion    2. Fecal impaction in rectum ([address]    3. Pneumonia due to organism    4.  Trochanteric bursitis of left hip        Critical Care:       Disposition referral (if applicable):  Sulma Johns MD  [address] [facility] [address] [phone]    Call in 1 week  follow up from hospital stay    Peggy Toure, [facility], [address] [place] [address] [address]    Schedule an appointment as soon as possible for a visit  Follow-up on right heel      Disposition medications (if applicable):  Discharge

## 2018-11-24 PROBLEM — T45.515A PROTHROMBIN TIME INCREASED DUE TO COUMADIN: Status: ACTIVE | Noted: 2018-01-01

## 2018-11-24 PROBLEM — R79.1 PROTHROMBIN TIME INCREASED DUE TO COUMADIN: Status: ACTIVE | Noted: 2018-01-01

## 2018-11-24 PROBLEM — E43 SEVERE PROTEIN-CALORIE MALNUTRITION (HCC): Status: ACTIVE | Noted: 2018-01-01

## 2018-11-24 NOTE — PROGRESS NOTES
Pt hollering out for his wife. Explained to pt that his wife was at home and he was still in the hospital. Pt verbalized understanding.

## 2018-11-24 NOTE — PROGRESS NOTES
Perfectserve sent to nocturnist regarding panic protime of 119.3 and INR of 10.46. No active bleeding noted.   Foreign Seats

## 2018-11-24 NOTE — PROGRESS NOTES
AM assessment completed, see flowsheet. Patient resting in bed at this time. Repeatedly yelling out for wife. Wife called facility and writer let patient speak to her on telephone in room. Patient continues with agitation and confusion, stating we have him \"locked up in the nut house\". Respirations easy and even at rest, no s/s of distress. No c/o pain at this time. Bed in lowest position and locked, SR up x3. Call light and bedside table within reach.

## 2018-11-25 NOTE — PROGRESS NOTES
PT/INR reported to Dr Lorrain Najjar, new order to hold todays dose of Vitamin K and discontinue future doses.

## 2018-11-25 NOTE — CARE COORDINATION
Case Management Assessment  Initial Evaluation    Date/Time of Evaluation: 11/25/2018 4:13 PM  Assessment Completed by: Ryland Flores    Patient Name: Ayah Mackey  YOB: 1940  Diagnosis: Altered mental status [R41.82]  Altered mental status [R41.82]  Date / Time: 11/23/2018  5:18 AM  Admission status/Date:INPT 11/23/18  Chart Reviewed: Yes      Patient Interviewed: Yes   Family Interviewed:  Yes - pt wife      Hospitalization in the last 30 days:  Yes    Contacts  :     Relationship to Patient:   Phone Number:    Alternate Contact:     Relationship to Patient:     Phone Number:      Current  [address]: Spouse/Significant Other  Transportation: family    Meal Preparation: wife    Housing  Home Environment: home with wife and granddtr  Steps: n/a  Plans to Return to Present Housing: Yes  Other Identified Issues: -    Home Care Information  Currently active with [address] : Yes - [facility]  Type of Home Care Services: PT, OT, Nursing Services  Passport/Waiver : No  Passport/Waiver Services: -    Durable Medical Equipment   DME Provider: [facility]  Equipment: O2, [address] bed, walker, w/c    Has Home O2 in place on admit:  Yes  Informed of need to bring portable home O2 tank on day of discharge for nursing to connect prior to leaving:   Yes  Verbalized agreement/Understanding:   Yes    Community Service Affiliation  Dialysis:  Yes - [facility] M[facility]  Outpatient PT/OT: No    Cancer Center: No     CHF Clinic: No     Pulmonary Rehab: No  Pain Clinic: No  Community Mental Health: No    Wound Clinic: No     Other: -    DISCHARGE PLAN: reviewed chart and met with pt and pt wife at bedside. Pt wife reports that pt lives at home with her and is active with [facility] for SN. States that pt is active with [facility] for home O2, hhn and with [facility] for [facility] on MWF.  Pt wife would like for pt to return home at discharge with current

## 2018-11-25 NOTE — PROGRESS NOTES
COAG  Recent Labs      11/23/18   0710  11/24/18   0551  11/25/18   0603   INR  9.68*  10.46*  1.76*     CARDIAC ENZYMES  No results for input(s): CKTOTAL, CKMB, CKMBINDEX, TROPONINI in the last 72 hours. U/A:    Lab Results   Component Value Date    NITRITE neg 05/17/2013    COLORU BROWN 11/24/2018    WBCUA >100 11/24/2018    RBCUA 20-50 11/24/2018    MUCUS 1+ 03/12/2018    BACTERIA 4+ 11/24/2018    CLARITYU TURBID 11/24/2018    SPECGRAV 1.025 11/24/2018    LEUKOCYTESUR LARGE 11/24/2018    BLOODU LARGE 11/24/2018    GLUCOSEU 100 11/24/2018    AMORPHOUS Rare 11/24/2018       ABG  No results found for: KRZ4FVX, BEART, S8PRWGTO, PHART, THGBART, MWK9ALK, PO2ART, FSF7BTB      Assessment:  Active Problems:    Altered mental status    Prothrombin time increased due to coumadin    Severe protein-calorie malnutrition ([address]  Resolved Problems:    * No resolved hospital problems. *      Plan:  1. seroquel to 25 mg q hs ,helping to rest .  2. Nephrology following /h.d. Per nephrology  3. Pt/inr daily . 4. Restart coumadin per pharmacy . 5. Dc home poss.  Alexander Irene MD 11/25/2018 1:09 PM

## 2018-11-25 NOTE — PROGRESS NOTES
AM assessment completed, see flowsheet. Patient resting in bed at this time, calm and cooperative currently. All needs met. Respirations easy and even at rest. O2 via NC and tolerating well. Denies pain at this time. Bed in lowest position and locked, SR up x2. Call light and bedside table within reach.

## 2018-11-25 NOTE — PROGRESS NOTES
in heel, back, unrated, located where skin wounds are. RN aware. Cognition:    A&O to self, hospital.    Follows 1 step and 2 step commands, fairly consistently. Upper Extremity ROM/Strength:  Grossly impaired. Pt with limited digit 3 and 4 mobility on LUE. Pt may benefit from evaluation for splinting prior to discharge. Upper Extremity Sensation:  No apparent deficits    Upper Extremity Proprioception:  No apparent deficits    Skin Integrity:  Poor skin integrity     Coordination and Tone: FM coordination impaired      Activity Tolerance: Poor     Balance:    Static Sitting: Poor     Bed mobility:    Supine to sit: MaxA x2 people, pt unable to achieve full sit at EOB this date. Sit to supine: MaxA x2 people   Scooting to head of bed: MaxA x2 people   Rolling: ModA bilaterally     ADLs:  LE dressing: MaxA to don/doff socks     Positioning Needs: Pt in bed with needs in place and alarm met. Patient/Family Education:  Role of OT, POC    Family Present: None    Staff Transfer Message (as written in room): Bed mobility only at this time     Assessment of Deficits: Self Care [id number]  Pt demonstrated decreased activity tolerance, decreased safety awareness, decreased strength, decreased ROM, decreased balance, and decreased bed mobility, transfers, dressing, and bathing. Pt. Limited during evaluation by fatigue. Pt will benefit from skilled OT while in the hospital and upon d/c in order to facilitate progress toward safe functioning. At end of evaluation, pt. In bed, alarm activated with call light and needs within reach. RN notified. Goals :   Self Care [id number]  To be met in 3 Visits:  1). Indep with UE ex x 10 reps    To be met in 5 Visits:  1). Supine to Sit: ModA  2). Bed to Chair/BSC: Will tolerate assessment   3). Upper Body Bathing: ModA  4). Lower Body Bathing: ModA  5). Upper Body Dressin Medical [place]  6). Lower Body Dressin Medical [place]  7).  Pt to oleg UE exs [id number]    Rehabilitation Potential:  Good

## 2018-11-25 NOTE — PLAN OF CARE
Problem: Falls - Risk of:  Goal: Will remain free from falls  Will remain free from falls   Outcome: Ongoing  [place] fall-risk light on outside door, fall precautions in place. Goal: Absence of physical injury  Absence of physical injury   Outcome: Ongoing      Problem: Infection:  Goal: Will remain free from infection  Will remain free from infection   Outcome: Ongoing      Problem: Safety:  Goal: Free from accidental physical injury  Free from accidental physical injury   Outcome: Ongoing    Goal: Free from intentional harm  Free from intentional harm   Outcome: Ongoing      Problem: Daily Care:  Goal: Daily care needs are met  Daily care needs are met   Outcome: Ongoing  Patient assisted with ADLs as indicated. Problem: Pain:  Goal: Patient's pain/discomfort is manageable  Patient's pain/discomfort is manageable   Outcome: Ongoing  Patient denies pain at this time. Instructed to notify staff if experiencing pain. Verbalizes agreement. Problem: Skin Integrity:  Goal: Skin integrity will stabilize  Skin integrity will stabilize   Outcome: Ongoing  Patient turned/repositioned every 2 hours and as needed to maintain and improve skin integrity. Problem: Risk for Impaired Skin Integrity  Goal: Tissue integrity - skin and mucous membranes  Structural intactness and normal physiological function of skin and  mucous membranes.    Outcome: Not Met This Shift

## 2018-11-25 NOTE — PROGRESS NOTES
intake, Weight loss greater than or equal to 5% in 1 month, Moderate loss of subcutaneous fat, Moderate muscle loss    Objective Information:  · Nutrition-Focused Physical Findings: Pt appears frail; he had many bruises and scabs noted onhis arm. Wife was at bedside and reported that he has not been eating well for > month. . Wife has been gving him ensure at home he accepts 50%,  his mouth is sore and noted blisters on tip of tongue, black spots underside of tongue, he states his gums hurt. He would like mashed potatoes and gravy. · Wound Type: Stage III, Pressure Ulcer  · Current Nutrition Therapies:  · Oral Diet Orders: Carb Control 4 Carbs/Meal, Renal, Fluid Restriction   · Oral Diet intake: 51-75%, 26-50%  · Oral Nutrition Supplement (ONS) Orders: None  · ONS intake:    · Anthropometric Measures:  · Ht: 5' 7\" (170.2 cm)   · Current Body Wt: 161 lb (73 kg)  · Admission Body Wt: 161 lb (73 kg)  · Usual Body Wt: 170 lb (77.1 kg)  · % Weight Change:  ,   (5.2 loss x 30 days)  · Ideal Body Wt: 148 lb (67.1 kg), % Ideal Body  (108)  · Adjusted Body Wt:  , body weight adjusted for    · BMI Classification: BMI 25.0 - 29.9 Overweight    Nutrition Interventions:   Modify current diet, Start ONS  Continued Inpatient Monitoring, Coordination of Care, Coordination of Community Care    Nutrition Evaluation:   · Evaluation: Goals set   · Goals: Pt will increase his intake by consuming > 75% of pureed meals and NEPRO and weight will be 161# or greater .      · Monitoring: Meal Intake, Supplement Intake, Diet Tolerance, Wound Healing, Weight      Electronically signed by Lionel Vera RD, LD on 11/24/18 at 8:28 PM    Contact Number: [phone]

## 2018-11-26 NOTE — DISCHARGE INSTR - COC
Number of days: 108       Wound 08/09/18 Other (Comment) Foot Lateral (Active)   Number of days: 108       Wound 08/09/18 Other (Comment) Coccyx (Active)   Number of days: 108       Wound 10/24/18 Heel Left;Medial (Active)   Wound Image   11/5/2018 12:40 PM   Wound Type Wound 11/25/2018  8:43 PM   Wound Unstageable 11/25/2018 10:28 AM   Dressing Status Clean;Dry; Intact 11/25/2018  8:43 PM   Dressing Changed Dressing reinforced 11/24/2018  9:00 PM   Dressing/Treatment Foam 11/25/2018  8:43 PM   Wound Cleansed Rinsed/Irrigated with saline 11/5/2018 12:40 PM   Dressing Change Due 11/07/18 11/6/2018  9:41 AM   Wound Length (cm) 2 cm 11/23/2018  3:24 PM   Wound Width (cm) 2 cm 11/23/2018  3:24 PM   Wound Depth (cm)  0.3 11/5/2018 12:40 PM   Calculated Wound Size (cm^2) (l*w) 4 cm^2 11/23/2018  3:24 PM   Change in Wound Size % (l*w) -185.71 11/23/2018  3:24 PM   Wound Assessment Bleeding;Pink 11/23/2018  3:24 PM   Drainage Amount Small 11/23/2018  3:24 PM   Drainage Description Serosanguinous 11/23/2018  3:24 PM   Odor Mild 11/23/2018  3:24 PM   Margins Defined edges; Attached edges 11/6/2018  9:41 AM   [place]-wound Assessment Intact 11/6/2018  9:41 AM   [place]%Wound Bed 75 11/5/2018 12:40 PM   Red%Wound Bed 10 11/5/2018 12:40 PM   Black%Wound Bed 15 11/5/2018 12:40 PM   Culture Taken No 11/25/2018 10:28 AM   Number of days: 32       Wound 10/24/18 Heel Distal (Active)   Wound Type Wound 10/24/2018 11:54 PM   Number of days: 32       Wound 10/24/18 Heel Right (Active)   Wound Image   11/5/2018 12:40 PM   Wound Type Wound 11/25/2018  8:43 PM   Wound Unstageable 11/25/2018 10:28 AM   Dressing Status Clean;Dry; Intact 11/25/2018  8:43 PM   Dressing Changed Dressing reinforced 11/24/2018  9:00 PM   Dressing/Treatment Foam 11/25/2018  8:43 PM   Wound Cleansed Rinsed/Irrigated with saline 11/5/2018 12:40 PM   Dressing Change Due 11/07/18 11/6/2018  9:41 AM   Wound Length (cm) 6 cm 11/23/2018  3:24 PM   Wound Width (cm) 3 cm

## 2018-11-26 NOTE — PROGRESS NOTES
ENZYMES  No results for input(s): CKTOTAL, CKMB, CKMBINDEX, TROPONINI in the last 72 hours. 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 *      Plan:  1. seroquel to 25 mg q hs ,helping to rest .  2. Nephrology following /h.d. Per nephrology  3. Pt/inr daily . 4. On  coumadin per pharmacy . 5. Dc home today after h.d.      Jamee Michelle MD 11/26/2018 10:43 AM

## 2018-11-26 NOTE — PROGRESS NOTES
Pt A/O in bed and quiet. Requested a turkey sandwhich. SR up x2, bed in lowest position, wheels locked,bed alarm on, Call light and bedside table in easy reach.    Magda Kuo RN    Tele-sitter in place for safety

## 2018-11-27 NOTE — PROGRESS NOTES
Spoke to [email or web address] via telephone who stated [facility] will be there at 11 am to pick pt up for discharge home. Called Macario at [facility] who stated they would be picking pt up between 6 - 1130 am. Will call wife with discharge paperwork prior to .

## 2018-11-27 NOTE — PROGRESS NOTES
needed treatment for 72  Hrs. Patients Ordered on a Mucolytic Agent:    1. Must always be administered with a bronchodilator. 2. Discontinue if patient experiences worsened bronchospasm, or secretions have lessened to the point that the patient is able to clear them with a cough. Anti-inflammatory and Combination Medications:    1. If the patient lacks prior history of lung disease, is not using inhaled anti-inflammatory medication at home, and lacks wheezing by examination or by history for at least 24 hours, contact physician for possible discontinuation.

## 2018-11-27 NOTE — PROGRESS NOTES
Discharge instructions completed with wife Brennendemetrio Urbina via telephone. AVS reviewed, education, follow-up appointments, and medications. Wife stated she would make the PCP follow up appointment within 1 week with Dr. Key Estrada. Recommendations from wound care for podiatry consult with Dr. Bright Cook discussed with wife related to right heel wound with eschar present. Supplies sent for wound care and reviewed with wife. Case management has [address] set up for home care. Talked about the importance of turning the pt every 2 hours on/off each side to help wound healing. Wife set up [address] [facility] to pick pt up around 1130 am today for discharge.

## 2018-11-27 NOTE — PROGRESS NOTES
Bedside report and patient care transferred to [facility]. Patient currently off floor for dialysis.

## 2018-11-27 NOTE — CARE COORDINATION
[facility]    Updated noted sent regarding [facility] for home care and adding [facility] to the care link for this patient.        Ron Soriano  Work mobile: [phone]  [facility] office: [phone]

## 2018-11-27 NOTE — CONSULTS
[facility] Contin[facility] Nurse  Follow-up Progress Note       NAME:  Sreekanth Loving  MEDICAL RECORD NUMBER:  [id number]  AGE:  77 y.o. GENDER:  male  :  1940  TODAY'S DATE:  2018    Subjective:     Patient admitted to [facility] with altered mental status. Wound care consult due to the worsening of skin issues since last admission and discharge home on 18. Wound Identification:  Wound Type: diabetic, pressure and moisture associated skin damage. Contributing Factors: diabetes, chronic pressure, decreased mobility, shear force, arterial insufficiency, decreased tissue oxygenation, incontinence of stool, incontinence of urine and poor hygiene        Patient Goal of Care:  [x] Wound Healing - per wife. [] Odor Control  [] Palliative Care  [] Pain Control   [x] Other: continue Santyl to bilateral heels    Objective:    /66   Pulse 78   Temp 96.9 °F (36.1 °C) (Axillary)   Resp 17   Ht 5' 7\" (1.702 m)   Wt 161 lb 2.5 oz (73.1 kg)   SpO2 94%   BMI 25.24 kg/m²   Jono Risk Score: Jono Scale Score: 12  Assessment:   Measurements:      Wound 10/24/18 Heel Left;Medial (Active)   Wound Image   2018 12:40 PM   Wound Type Wound 2018  8:43 PM   Wound Unstageable 2018 10:28 AM   Dressing Status Clean;Dry; Intact 2018  8:43 PM   Dressing Changed Dressing reinforced 2018  9:00 PM   Dressing/Treatment Foam 2018  8:43 PM   Wound Cleansed Rinsed/Irrigated with saline 2018 12:40 PM   Dressing Change Due 18  9:41 AM   Wound Length (cm) 2 cm 2018  3:24 PM   Wound Width (cm) 2 cm 2018  3:24 PM   Wound Depth (cm)  0.3 2018 12:40 PM   Calculated Wound Size (cm^2) (l*w) 4 cm^2 2018  3:24 PM   Change in Wound Size % (l*w) -185.71 2018  3:24 PM   Wound Assessment Bleeding;Pink 2018  3:24 PM   Drainage Amount Small 2018  3:24 PM   Drainage Description Serosanguinous

## 2018-11-27 NOTE — PROGRESS NOTES
4 Eyes Skin Assessment     The patient is being assess for   Pt being transfered home , multiple wounds, 4eyes done at d/c prior to home    I agree that 2 RN's have performed a thorough Head to Toe Skin Assessment on the patient. ALL assessment sites listed below have been assessed. Areas assessed by both nurses: Nataly Patrick   [x]   Head, Face, and Ears   [x]   Shoulders, Back, and Chest, Abdomen  [x]   Arms, Elbows, and Hands   [x]   Coccyx, Sacrum, and Ischium  [x]   Legs, Feet, and Heels         11/27/18 1010   Wound 10/24/18 Heel Left;Medial   Date First Assessed/Time First Assessed: 10/24/18 2200   Wound Event: Pressure  Location: Heel  Wound Location Orientation: Left;Medial  Pre-existing: Yes   Wound Type Wound   Wound Pressure Stage  2   Dressing Status Old drainage; Changed   Dressing Changed Changed/New   Dressing/Treatment Foam   Wound Cleansed Rinsed/Irrigated with saline   Dressing Change Due 11/30/18   Wound Length (cm) 2 cm   Wound Width (cm) 2 cm   Wound Depth (cm)  0.2   Calculated Wound Size (cm^2) (l*w) 4 cm^2   Change in Wound Size % (l*w) -185.71   Wound Assessment Red;Yellow;Painful   Drainage Amount Scant   Drainage Description Serosanguinous   Odor Mild   Margins Defined edges; Attached edges   Wound 10/24/18 Heel Right   Date First Assessed/Time First Assessed: 10/24/18 2200   Wound Event: Pressure  Location: Heel  Wound Location Orientation: Right  Pre-existing: Yes   Wound Type Wound   Wound Pressure Stage  3   Dressing Status Old drainage;New drainage   Dressing Changed Dressing reinforced   Dressing/Treatment Foam   Wound Cleansed Rinsed/Irrigated with saline   Dressing Change Due 11/30/18   Wound Length (cm) 6 cm   Wound Width (cm) 3.6 cm   Wound Depth (cm)  0.4   Calculated Wound Size (cm^2) (l*w) 21.6 cm^2   Change in Wound Size % (l*w) -1178.11   Wound Assessment Brown;Black; Red;Bleeding   Drainage Amount Moderate   Drainage Description Serosanguinous   Odor Mild   Margins Defined edges   Wound 11/24/18 Other (Comment) Back Mid   Date First Assessed/Time First Assessed: 11/24/18 1835   Wound Type: (c) Other (Comment)  Wound Event: Not known  Location: Back  Wound Location Orientation: Mid  Pre-existing: No  Photo Taken: No   Wound Other  (excoration related wound)   Dressing Status Old drainage;New drainage   Dressing Changed Changed/New   Dressing/Treatment Foam   Wound Cleansed Rinsed/Irrigated with saline   Dressing Change Due 11/30/18   Wound Length (cm) 3 cm   Wound Width (cm) 1.6 cm   Wound Depth (cm)  0.2   Calculated Wound Size (cm^2) (l*w) 4.8 cm^2   Change in Wound Size % (l*w) 5.88   Wound Assessment Red; Swelling   Drainage Amount Scant   Drainage Description Serosanguinous   Odor Mild   Culture Taken No   Wound 11/24/18 Other (Comment) Back Mid   Date First Assessed/Time First Assessed: 11/24/18 1835   Wound Type: (c) Other (Comment)  Wound Event: Not known  Location: Back  Wound Location Orientation: Mid  Pre-existing: No  Photo Taken: No   Wound Other  (excoration related wound)   Dressing Status Old drainage;New drainage   Dressing Changed Changed/New   Dressing/Treatment Foam   Wound Cleansed Rinsed/Irrigated with saline   Dressing Change Due 11/30/18   Wound Length (cm) 6 cm   Wound Width (cm) 4.2 cm   Wound Depth (cm)  0.2   Calculated Wound Size (cm^2) (l*w) 25.2 cm^2   Change in Wound Size % (l*w) 5.55   Wound Assessment Red;Slough;Painful   Drainage Amount Moderate   Drainage Description Serosanguinous   Odor Mild   Wound 11/24/18 Other (Comment) Back Mid   Date First Assessed/Time First Assessed: 11/24/18 0835   Wound Type: (c) Other (Comment)  Wound Event: Not known  Location: Back  Wound Location Orientation: Mid  Pre-existing: No  Photo Taken: No   Wound Type Wound   Wound Other  (shearing)   Dressing Status Dry; Intact   Dressing Changed Changed/New   Dressing/Treatment Foam   Wound Cleansed Rinsed/Irrigated with saline   Dressing Change Due 11/30/18   Wound

## 2018-11-30 PROBLEM — N39.0 URINARY TRACT INFECTION WITHOUT HEMATURIA: Status: ACTIVE | Noted: 2018-01-01

## 2018-11-30 PROBLEM — R31.9 URINARY TRACT INFECTION WITH HEMATURIA: Status: ACTIVE | Noted: 2018-01-01

## 2018-12-19 NOTE — TELEPHONE ENCOUNTER
Writer contacted Nicole)  ED provider to inform of 30 day readmission risk. ED provider informed writer of readmission.

## 2018-12-21 PROBLEM — G93.41 ENCEPHALOPATHY, METABOLIC: Status: ACTIVE | Noted: 2018-01-01

## 2018-12-29 PROBLEM — J96.21 ACUTE ON CHRONIC RESPIRATORY FAILURE WITH HYPOXIA (HCC): Status: ACTIVE | Noted: 2018-01-01

## 2018-12-29 PROBLEM — D68.9 COAGULOPATHY (HCC): Status: ACTIVE | Noted: 2018-01-01

## 2018-12-29 PROBLEM — R78.81 GRAM-POSITIVE COCCI BACTEREMIA: Status: ACTIVE | Noted: 2018-01-01

## 2019-01-01 LAB
BLOOD CULTURE, ROUTINE: NORMAL
CULTURE, BLOOD 2: NORMAL

## (undated) DEVICE — Device

## (undated) DEVICE — SUTURE MCRYL SZ 4-0 L18IN ABSRB UD L19MM PS-2 3/8 CIR PRIM Y496G

## (undated) DEVICE — INTENDED TO BE USED TO OCCLUDE, RETRACT AND IDENTIFY ARTERIES, VEINS, TENDONS AND NERVES IN SURGICAL PROCEDURES: Brand: STERION®  VESSEL LOOP

## (undated) DEVICE — DISH PETRI ST LF

## (undated) DEVICE — SMALL (YELLOW) FOR GRAFTS UP TO 6 MM, 12" / 30.5 CM (1/PKG): Brand: SCANLAN® VASCULAR TUNNELER SHEATHS AND BULLET TIPS

## (undated) DEVICE — FOGARTY - HYDRAGRIP SURGICAL - CLAMP INSERTS: Brand: FOGARTY SOFTJAW

## (undated) DEVICE — GOWN SIRUS NONREIN XL W/TWL: Brand: MEDLINE INDUSTRIES, INC.

## (undated) DEVICE — GOWN SIRUS NONREIN LG W/TWL: Brand: MEDLINE INDUSTRIES, INC.

## (undated) DEVICE — GLOVE SURG SZ 8 L11.77IN FNGR THK9.8MIL STRW LTX POLYMER

## (undated) DEVICE — SUTURE NONABSORBABLE MONOFILAMENT 6-0 BV-1 1X30 IN PROLENE 8709H

## (undated) DEVICE — SUTURE VCRL + SZ 3-0 L27IN ABSRB UD L26MM SH 1/2 CIR VCP416H

## (undated) DEVICE — SOLUTION IV IRRIG POUR BRL 0.9% SODIUM CHL 2F7124

## (undated) DEVICE — SHEET,DRAPE,53X77,STERILE: Brand: MEDLINE

## (undated) DEVICE — SUTURE PERMAHAND SZ 3-0 L30IN NONABSORBABLE BLK SILK BRAID A304H

## (undated) DEVICE — AORTIC PNCH 4.0MM 8IN BX 10 DISP